# Patient Record
Sex: MALE | Race: WHITE | Employment: OTHER | URBAN - METROPOLITAN AREA
[De-identification: names, ages, dates, MRNs, and addresses within clinical notes are randomized per-mention and may not be internally consistent; named-entity substitution may affect disease eponyms.]

---

## 2017-03-09 DIAGNOSIS — I25.10 CORONARY ARTERY DISEASE INVOLVING NATIVE HEART, ANGINA PRESENCE UNSPECIFIED, UNSPECIFIED VESSEL OR LESION TYPE: ICD-10-CM

## 2017-03-09 RX ORDER — NITROGLYCERIN 0.4 MG/1
0.4 TABLET SUBLINGUAL EVERY 5 MIN PRN
Qty: 100 TABLET | Refills: 1 | Status: SHIPPED | OUTPATIENT
Start: 2017-03-09 | End: 2018-09-27

## 2017-05-17 DIAGNOSIS — I25.10 CORONARY ARTERY DISEASE INVOLVING NATIVE HEART, ANGINA PRESENCE UNSPECIFIED, UNSPECIFIED VESSEL OR LESION TYPE: ICD-10-CM

## 2017-05-17 DIAGNOSIS — I10 ESSENTIAL HYPERTENSION, BENIGN: ICD-10-CM

## 2017-05-17 RX ORDER — CARVEDILOL 25 MG/1
25 TABLET ORAL 2 TIMES DAILY WITH MEALS
Qty: 180 TABLET | Refills: 0 | Status: SHIPPED | OUTPATIENT
Start: 2017-05-17 | End: 2017-09-20

## 2017-05-17 RX ORDER — RAMIPRIL 10 MG/1
10 CAPSULE ORAL 2 TIMES DAILY
Qty: 180 CAPSULE | Refills: 0 | Status: SHIPPED | OUTPATIENT
Start: 2017-05-17 | End: 2017-09-20

## 2017-05-17 NOTE — TELEPHONE ENCOUNTER
LOV 4/21/16. Future OV 5/30/17. RN refilled rx for sufficient amt of medication unitl apt on 5/30/17 with Dr. Valdez for further refills.

## 2017-05-30 ENCOUNTER — OFFICE VISIT (OUTPATIENT)
Dept: CARDIOLOGY | Facility: CLINIC | Age: 72
End: 2017-05-30
Attending: INTERNAL MEDICINE
Payer: COMMERCIAL

## 2017-05-30 VITALS
HEIGHT: 74 IN | HEART RATE: 64 BPM | WEIGHT: 283 LBS | BODY MASS INDEX: 36.32 KG/M2 | SYSTOLIC BLOOD PRESSURE: 120 MMHG | DIASTOLIC BLOOD PRESSURE: 70 MMHG

## 2017-05-30 DIAGNOSIS — I25.10 CORONARY ARTERY DISEASE INVOLVING NATIVE HEART, ANGINA PRESENCE UNSPECIFIED, UNSPECIFIED VESSEL OR LESION TYPE: ICD-10-CM

## 2017-05-30 DIAGNOSIS — I10 ESSENTIAL HYPERTENSION, BENIGN: ICD-10-CM

## 2017-05-30 DIAGNOSIS — I35.0 NONRHEUMATIC AORTIC VALVE STENOSIS: Primary | ICD-10-CM

## 2017-05-30 PROCEDURE — 99214 OFFICE O/P EST MOD 30 MIN: CPT | Performed by: INTERNAL MEDICINE

## 2017-05-30 RX ORDER — TAMSULOSIN HYDROCHLORIDE 0.4 MG/1
0.4 CAPSULE ORAL DAILY
COMMUNITY
End: 2019-02-27

## 2017-05-30 NOTE — LETTER
5/30/2017    Nomi Gil MD  [facility]   [address]  [facility] [address]    RE: Travis Villa       Dear Colleague,    I had the pleasure of seeing Travis Villa in the [facility].    Travis Villa, a 71-year-old man with coronary artery disease, hypertension, type 2 diabetes mellitus, osteoarthritis, obesity and prostatic hypertrophy was seen today at your request for followup.      Mr. Villa underwent implantation of 2 sirolimus-eluting stents in the right coronary and 1 sirolimus-eluting stent in the circumflex in 2008 after a screening nuclear stress test showed inferolateral ischemia.  The left main and LAD had no significant narrowing.  He underwent a negative nuclear stress test in 2012 and in 2016.  He remains  free of angina since I last saw him. .     The patient has been tried on all different statin agents and has not tolerated them.      Mr. Villa has disabling osteoarthritis involving his hips.  He is contemplating surgery with Dr. Forbes at some point in the future.  The patient tells me he was diagnosed with low testosterone levels and has been on AndroGel supplements. The patient is no longer taking supplements and he plans to ask Dr. Gil if he can resume them.      The patient  has gained approximately 9 pounds since I saw him last year.  His activities are limited by disabling hip pain.      PAST MEDICAL HISTORY:   1.  Coronary artery disease:   a.  History of stent implantation in right coronary and circumflex in 2008.   b.  Negative nuclear stress tests in 2012 and 2016.     2.  Mild aortic stenosis.  Echocardiogram 2015 showing mean systolic gradient of 15, normal left ventricular function.   3.  Osteoarthritis awaiting possible hip replacement.   4.  Diabetes mellitus.   5.  Prostatic hypertrophy.   6.  Low testosterone levels.      PHYSICAL EXAMINATION:   GENERAL:  Exam today demonstrates a very pleasant and  cooperative 71-year-old man.   VITAL SIGNS:  His blood pressure was 120/70.  His heart rate is 64.  His height is 1.9 meters.  His weight is 128.4 kg for a BMI of 36.4.   LUNGS:  Clear to percussion and auscultation.   CARDIOVASCULAR:  Shows a normal S1 with a normal S2 with a grade 2/6 systolic ejection murmur.  His pulses are full and symmetrical with no pulsus parvus et tardus.     Outpatient Encounter Prescriptions as of 5/30/2017   Medication Sig Dispense Refill     tamsulosin (FLOMAX) 0.4 MG capsule Take 0.4 mg by mouth daily       ramipril (ALTACE) 10 MG capsule Take 1 capsule (10 mg) by mouth 2 times daily 180 capsule 0     carvedilol (COREG) 25 MG tablet Take 1 tablet (25 mg) by mouth 2 times daily (with meals) 180 tablet 0     nitroglycerin (NITROSTAT) 0.4 MG sublingual tablet Place 1 tablet (0.4 mg) under the tongue every 5 minutes as needed for chest pain 100 tablet 1     indapamide (LOZOL) 1.25 MG tablet Take 1 tablet (1.25 mg) by mouth every morning 90 tablet 3     [DISCONTINUED] amLODIPine (NORVASC) 5 MG tablet Take 1 tablet (5 mg) by mouth daily 90 tablet 3     Niacin, Antihyperlipidemic, (NIASPAN PO) Take 1,000 mg by mouth 2 times daily.       METFORMIN HCL PO Take 500 mg by mouth daily (with breakfast).       ASPIRIN PO Take 81 mg by mouth 2 times daily.       Ascorbic Acid (VITAMIN C PO) Take 1,000 mg by mouth 2 times daily.       multivitamin, therapeutic with minerals (MULTI-VITAMIN) TABS Take 1 tablet by mouth daily.       CYANOCOBALAMIN PO Take 1,000 mcg by mouth 2 times daily.       Flaxseed, Linseed, OIL Take 1,300 mg by mouth 2 times daily.       Omega-3 Fatty Acids (OMEGA-3 FISH OIL PO) Take 2,800 g by mouth 2 times daily (with meals).       GLUCOSAMINE SULFATE PO Take 1,500 mg by mouth 2 times daily.       calcium-magnesium (CALMAG) 500-250 MG TABS Take 1 tablet by mouth 2 times daily (with meals).       Saw Palmetto, Serenoa repens, (SAW PALMETTO CONCENTRATE OR) Take 500 mg by mouth 2  times daily.       FOLIC ACID PO Take 400 mcg by mouth 2 times daily        garlic 150 MG TABS Take 1,000 mg by mouth 2 times daily.       Resveratrol 100 MG CAPS Take 100 mg by mouth 2 times daily.       Cholecalciferol (VITAMIN D3 PO) Take 5,000 Units by mouth daily.       COENZYME Q-10 PO Take 200 mg by mouth daily.       No facility-administered encounter medications on file as of 5/30/2017.       ASSESSMENT:  Mr. Villa has no cardiovascular symptoms at this time.  He underwent a recent nuclear stress test that showed no ischemia and a well-preserved ejection fraction.  He has known mild aortic stenosis and a followup echo would be reasonable to make certain there has been no progression, especially if hip surgery is considered in the future.      There is conflicting data regarding the cardiovascular risk of testosterone supplementation.  If you feel the patient would  benefit from testosterone, there is no definite cardiovascular contraindication.  I am uncertain whether the patient's mildly elevated PSA level would disqualify him from testosterone, but I would discuss this issue with his urologist.      If you feel the patient would benefit from hip surgery, I see no cardiovascular contraindication and I suspect that unless his echo shows that there has been significant progression in his aortic stenosis, he can proceed to surgery without further cardiac testing.      I have reviewed the benefits of a Mediterranean style weight loss diet.  The patient may wish to consider water aerobics as a source of exercise to help maintain his weight.  I informed him that calorie restriction is the only way to lose weight.      The patient does not tolerated statin therapy in the past and presently, there is not clear evidence that PCSK9 inhibitors are helpful in this setting.      RECOMMENDATIONS:   1.  Echocardiogram to make certain that his aortic stenosis has not progressed.   2.  Barring any surprising findings on  the echo, I believe the patient could undergo hip surgery at low cardiovascular risk.   3.  The patient will benefit from weight loss and a regular exercise program.  I suggested water aerobics and a Mediterranean-style diet.   4.  Unless the urologist feels that the patient cannot safely take androgen supplement due to his elevated PSA level, I see clear cardiovascular contraindication to testosterone therapy if you feel the patient would benefit.   5.  Followup visit with me in 1 year.      We greatly appreciate the opportunity to help your patient, Mr. Travis Villa.     Sincerely,    Ambrocio Valdez MD     [facility]    cc:      Wes Forbes MD  [facility]

## 2017-05-30 NOTE — MR AVS SNAPSHOT
After Visit Summary   5/30/2017    Travis Villa    MRN: [id number]           Patient Information     Date Of Birth          1945        Visit Information        Provider Department      5/30/2017 9:15 AM Ambrocio Valdez MD [facility]        Today's Diagnoses     Nonrheumatic aortic valve stenosis    -  1    Coronary artery disease involving native heart, angina presence unspecified, unspecified vessel or lesion type        Essential hypertension, benign           Follow-ups after your visit        Additional Services     Follow-Up with Cardiologist                 Your next 10 appointments already scheduled     Jun 09, 2017  2:00 PM CDT   Ech Complete with [id number]   [facility] CV Echocardiography (Cardiovascular Imaging at [facility])    [address] [place] [id number] [phone]           1.  Please bring or wear a comfortable two-piece outfit. 2.  You may eat, drink and take your normal medicines. 3.  For any questions that cannot be answered, please contact the ordering physician              Who to contact     If you have questions or need follow up information about today's clinic visit or your schedule please contact [facility] [place] directly at [phone].  Normal or non-critical lab and imaging results will be communicated to you by MyChart, letter or phone within 4 business days after the clinic has received the results. If you do not hear from us within 7 days, please contact the clinic through MyChart or phone. If you have a critical or abnormal lab result, we will notify you by phone as soon as possible.  Submit refill requests through [employer] or call your pharmacy and they will forward the refill request to us. Please allow 3 business days for your refill to be completed.          Additional Information About Your Visit        MyChart  "Information     [employer] lets you send messages to your doctor, view your test results, renew your prescriptions, schedule appointments and more. To sign up, go to [email or web address] . Click on \"Log in\" on the left side of the screen, which will take you to the Welcome page. Then click on \"Sign up Now\" on the right side of the page.     You will be asked to enter the access code listed below, as well as some personal information. Please follow the directions to create your username and password.     Your access code is: [email or web address]  Expires: 2017  8:20 AM     Your access code will  in 90 days. If you need help or a new code, please call your [facility] clinic or [phone].        Care EveryWhere ID     This is your Care EveryWhere ID. This could be used by other organizations to access your [facility] medical records  [id number]        Your Vitals Were     Pulse Height BMI (Body Mass Index)             64 1.88 m (6' 2\") 36.34 kg/m2          Blood Pressure from Last 3 Encounters:   17 120/70   16 142/78   16 142/72    Weight from Last 3 Encounters:   17 128.4 kg (283 lb)   16 123.4 kg (272 lb)   03/11/15 128 kg (282 lb 3 oz)              We Performed the Following     Follow-Up with Cardiologist        Primary Care Provider Office Phone # Fax #    Nomi Gil [phone]       [facility] [address] [address]        Thank you!     Thank you for choosing [facility] [place]  for your care. Our goal is always to provide you with excellent care. Hearing back from our patients is one way we can continue to improve our services. Please take a few minutes to complete the written survey that you may receive in the mail after your visit with us. Thank you!             Your Updated Medication List - Protect others around you: Learn how to safely use, store and throw away your medicines at " www.disposemymeds.org.          This list is accurate as of: 5/30/17 11:59 PM.  Always use your most recent med list.                   Brand Name Dispense Instructions for use    amLODIPine 5 MG tablet    NORVASC    90 tablet    Take 1 tablet (5 mg) by mouth daily       ASPIRIN PO      Take 81 mg by mouth 2 times daily.       calcium-magnesium 500-250 MG Tabs per tablet    CALMAG     Take 1 tablet by mouth 2 times daily (with meals).       carvedilol 25 MG tablet    COREG    180 tablet    Take 1 tablet (25 mg) by mouth 2 times daily (with meals)       COENZYME Q-10 PO      Take 200 mg by mouth daily.       CYANOCOBALAMIN PO      Take 1,000 mcg by mouth 2 times daily.       Flaxseed (Linseed) Oil      Take 1,300 mg by mouth 2 times daily.       FLOMAX 0.4 MG capsule   Generic drug:  tamsulosin      Take 0.4 mg by mouth daily       FOLIC ACID PO      Take 400 mcg by mouth 2 times daily       garlic 150 MG Tabs tablet      Take 1,000 mg by mouth 2 times daily.       GLUCOSAMINE SULFATE PO      Take 1,500 mg by mouth 2 times daily.       indapamide 1.25 MG tablet    LOZOL    90 tablet    Take 1 tablet (1.25 mg) by mouth every morning       METFORMIN HCL PO      Take 500 mg by mouth daily (with breakfast).       Multi-vitamin Tabs tablet      Take 1 tablet by mouth daily.       NIASPAN PO      Take 1,000 mg by mouth 2 times daily.       nitroglycerin 0.4 MG sublingual tablet    NITROSTAT    100 tablet    Place 1 tablet (0.4 mg) under the tongue every 5 minutes as needed for chest pain       OMEGA-3 FISH OIL PO      Take 2,800 g by mouth 2 times daily (with meals).       ramipril 10 MG capsule    ALTACE    180 capsule    Take 1 capsule (10 mg) by mouth 2 times daily       Resveratrol 100 MG Caps      Take 100 mg by mouth 2 times daily.       SAW PALMETTO CONCENTRATE OR      Take 500 mg by mouth 2 times daily.       VITAMIN C PO      Take 1,000 mg by mouth 2 times daily.       VITAMIN D3 PO      Take 5,000 Units by mouth  daily.

## 2017-05-30 NOTE — PROGRESS NOTES
HPI and Plan:   See dictation    Orders Placed This Encounter   Procedures     Follow-Up with Cardiologist       Orders Placed This Encounter   Medications     tamsulosin (FLOMAX) 0.4 MG capsule     Sig: Take 0.4 mg by mouth daily       There are no discontinued medications.      Encounter Diagnoses   Name Primary?     Coronary artery disease involving native heart, angina presence unspecified, unspecified vessel or lesion type      Essential hypertension, benign        CURRENT MEDICATIONS:  Current Outpatient Prescriptions   Medication Sig Dispense Refill     tamsulosin (FLOMAX) 0.4 MG capsule Take 0.4 mg by mouth daily       ramipril (ALTACE) 10 MG capsule Take 1 capsule (10 mg) by mouth 2 times daily 180 capsule 0     carvedilol (COREG) 25 MG tablet Take 1 tablet (25 mg) by mouth 2 times daily (with meals) 180 tablet 0     nitroglycerin (NITROSTAT) 0.4 MG sublingual tablet Place 1 tablet (0.4 mg) under the tongue every 5 minutes as needed for chest pain 100 tablet 1     amLODIPine (NORVASC) 5 MG tablet Take 1 tablet (5 mg) by mouth daily 90 tablet 3     indapamide (LOZOL) 1.25 MG tablet Take 1 tablet (1.25 mg) by mouth every morning 90 tablet 3     Niacin, Antihyperlipidemic, (NIASPAN PO) Take 1,000 mg by mouth 2 times daily.       METFORMIN HCL PO Take 500 mg by mouth daily (with breakfast).       ASPIRIN PO Take 81 mg by mouth 2 times daily.       Ascorbic Acid (VITAMIN C PO) Take 1,000 mg by mouth 2 times daily.       multivitamin, therapeutic with minerals (MULTI-VITAMIN) TABS Take 1 tablet by mouth daily.       CYANOCOBALAMIN PO Take 1,000 mcg by mouth 2 times daily.       Flaxseed, Linseed, OIL Take 1,300 mg by mouth 2 times daily.       Omega-3 Fatty Acids (OMEGA-3 FISH OIL PO) Take 2,800 g by mouth 2 times daily (with meals).       GLUCOSAMINE SULFATE PO Take 1,500 mg by mouth 2 times daily.       calcium-magnesium (CALMAG) 500-250 MG TABS Take 1 tablet by mouth 2 times daily (with meals).       Saw  Palmetto, Serenoa repens, (SAW PALMETTO CONCENTRATE OR) Take 500 mg by mouth 2 times daily.       FOLIC ACID PO Take 400 mcg by mouth 2 times daily        garlic 150 MG TABS Take 1,000 mg by mouth 2 times daily.       Resveratrol 100 MG CAPS Take 100 mg by mouth 2 times daily.       Cholecalciferol (VITAMIN D3 PO) Take 5,000 Units by mouth daily.       COENZYME Q-10 PO Take 200 mg by mouth daily.         ALLERGIES     Allergies   Allergen Reactions     Atorvastatin      Muscle aches       PAST MEDICAL HISTORY:  Past Medical History:   Diagnosis Date     Benign essential HTN      Benign prostatic hyperplasia      Coronary artery disease involving native coronary artery of native heart      Diabetes mellitus (H)      GERD (gastroesophageal reflux disease)      Mixed hyperlipidemia      Myocardial infarction (H)      Obesity      Osteoarthritis      Unspecified disorder of metabolism        PAST SURGICAL HISTORY:  Past Surgical History:   Procedure Laterality Date     HEART CATH, ANGIOPLASTY  5/2/2008     PCI RCA,Circumflex rotational atherectomy, and ARETHA placement       FAMILY HISTORY:  History reviewed. No pertinent family history.    SOCIAL HISTORY:  Social History     Social History     Marital status:      Spouse name: N/A     Number of children: N/A     Years of education: N/A     Social History Main Topics     Smoking status: Never Smoker     Smokeless tobacco: None     Alcohol use Yes     Drug use: No     Sexual activity: Not Asked     Other Topics Concern     Caffeine Concern No     Special Diet Not Asked     [facility]     Exercise No     Seat Belt Yes     Social History Narrative       Review of Systems:  Skin:  Negative       Eyes:  Negative      ENT:  Negative      Respiratory:  Negative       Cardiovascular:    Positive for;fatigue;edema    Gastroenterology: Negative      Genitourinary:  not assessed      Musculoskeletal:  Positive for arthritis;joint pain;joint stiffness    Neurologic:   "Negative      Psychiatric:  Negative      Heme/Lymph/Imm:  Negative      Endocrine:  Negative        Physical Exam:  Vitals: /70  Pulse 64  Ht 1.88 m (6' 2\")  Wt 128.4 kg (283 lb)  BMI 36.34 kg/m2    Constitutional:  cooperative, alert and oriented, well developed, well nourished, in no acute distress obese      Skin:  warm and dry to the touch, no apparent skin lesions or masses noted        Head:  normocephalic, no masses or lesions        Eyes:  pupils equal and round, conjunctivae and lids unremarkable, sclera white, no xanthalasma, EOMS intact, no nystagmus        ENT:  no pallor or cyanosis, dentition good        Neck:  carotid pulses are full and equal bilaterally, JVP normal, no carotid bruit, no thyromegaly        Chest:  normal breath sounds, clear to auscultation, normal A-P diameter, normal symmetry, normal respiratory excursion, no use of accessory muscles          Cardiac: regular rhythm, normal S1/S2, no S3 or S4, apical impulse not displaced, no murmurs, gallops or rubs                  Abdomen:  abdomen soft, non-tender, BS normoactive, no mass, no HSM, no bruits obese      Vascular: pulses full and equal, no bruits auscultated                                        Extremities and Back:  no deformities, clubbing, cyanosis, erythema observed              Neurological:  affect appropriate, oriented to time, person and place              CC  Ambrocio Valdez MD   [facility]  [address]  [place] [id number]              "

## 2017-05-30 NOTE — PROGRESS NOTES
HISTORY OF PRESENT ILLNESS:  Travis Villa, a 71-year-old man with coronary artery disease, hypertension, type 2 diabetes mellitus, osteoarthritis, obesity and prostatic hypertrophy was seen today at your request for followup.      Mr. Villa underwent implantation of 2 sirolimus-eluting stents in the right coronary and 1 sirolimus-eluting stent in the circumflex in 2008 after a screening nuclear stress test showed inferolateral ischemia.  The left main and LAD had no significant narrowing.  He underwent a negative nuclear stress test in 2012 and in 2016.  He remains  free of angina since I last saw him. .     The patient has been tried on all different statin agents and has not tolerated them.      Mr. Villa has disabling osteoarthritis involving his hips.  He is contemplating surgery with Dr. Forbes at some point in the future.  The patient tells me he was diagnosed with low testosterone levels and has been on AndroGel supplements. The patient is no longer taking supplements and he plans to ask Dr. Gil if he can resume them.      The patient  has gained approximately 9 pounds since I saw him last year.  His activities are limited by disabling hip pain.      PAST MEDICAL HISTORY:   1.  Coronary artery disease:   a.  History of stent implantation in right coronary and circumflex in 2008.   b.  Negative nuclear stress tests in 2012 and 2016.     2.  Mild aortic stenosis.  Echocardiogram 2015 showing mean systolic gradient of 15, normal left ventricular function.   3.  Osteoarthritis awaiting possible hip replacement.   4.  Diabetes mellitus.   5.  Prostatic hypertrophy.   6.  Low testosterone levels.      PHYSICAL EXAMINATION:   GENERAL:  Exam today demonstrates a very pleasant and cooperative 71-year-old man.   VITAL SIGNS:  His blood pressure was 120/70.  His heart rate is 64.  His height is 1.9 meters.  His weight is 128.4 kg for a BMI of 36.4.   LUNGS:  Clear to percussion and auscultation.   CARDIOVASCULAR:   Shows a normal S1 with a normal S2 with a grade 2/6 systolic ejection murmur.  His pulses are full and symmetrical with no pulsus parvus et tardus.      ASSESSMENT:  Mr. Villa has no cardiovascular symptoms at this time.  He underwent a recent nuclear stress test that showed no ischemia and a well-preserved ejection fraction.  He has known mild aortic stenosis and a followup echo would be reasonable to make certain there has been no progression, especially if hip surgery is considered in the future.      There is conflicting data regarding the cardiovascular risk of testosterone supplementation.  If you feel the patient would  benefit from testosterone, there is no definite cardiovascular contraindication.  I am uncertain whether the patient's mildly elevated PSA level would disqualify him from testosterone, but I would discuss this issue with his urologist.      If you feel the patient would benefit from hip surgery, I see no cardiovascular contraindication and I suspect that unless his echo shows that there has been significant progression in his aortic stenosis, he can proceed to surgery without further cardiac testing.      I have reviewed the benefits of a Mediterranean style weight loss diet.  The patient may wish to consider water aerobics as a source of exercise to help maintain his weight.  I informed him that calorie restriction is the only way to lose weight.      The patient does not tolerated statin therapy in the past and presently, there is not clear evidence that PCSK9 inhibitors are helpful in this setting.      RECOMMENDATIONS:   1.  Echocardiogram to make certain that his aortic stenosis has not progressed.   2.  Barring any surprising findings on the echo, I believe the patient could undergo hip surgery at low cardiovascular risk.   3.  The patient will benefit from weight loss and a regular exercise program.  I suggested water aerobics and a Mediterranean-style diet.   4.  Unless the urologist  feels that the patient cannot safely take androgen supplement due to his elevated PSA level, I see clear cardiovascular contraindication to testosterone therapy if you feel the patient would benefit.   5.  Followup visit with me in 1 year.      We greatly appreciate the opportunity to help your patient, Mr. Catracho Mike.      cc:      [place] [address]      Wes Forbes MD  [facility]         JAYDEN ALSTON MD             D: 2017 09:48   T: 2017 10:26   MT: DOMINGO      Name:     CATRACHO MIKE   MRN:      [id number]        Account:      [id number]   :      1945           Service Date: 2017      Document: [id number]

## 2017-06-02 DIAGNOSIS — I10 ESSENTIAL HYPERTENSION, BENIGN: ICD-10-CM

## 2017-06-02 RX ORDER — AMLODIPINE BESYLATE 5 MG/1
5 TABLET ORAL DAILY
Qty: 90 TABLET | Refills: 3 | Status: SHIPPED | OUTPATIENT
Start: 2017-06-02 | End: 2018-06-05

## 2017-06-09 ENCOUNTER — HOSPITAL ENCOUNTER (OUTPATIENT)
Dept: CARDIOLOGY | Facility: CLINIC | Age: 72
Discharge: HOME | End: 2017-06-09
Attending: INTERNAL MEDICINE | Admitting: INTERNAL MEDICINE
Payer: COMMERCIAL

## 2017-06-09 DIAGNOSIS — I35.0 NONRHEUMATIC AORTIC VALVE STENOSIS: ICD-10-CM

## 2017-06-09 PROCEDURE — 40000264 ECHO COMPLETE WITH LUMASON

## 2017-06-09 PROCEDURE — 25500064 ZZH RX 255 OP 636: Performed by: INTERNAL MEDICINE

## 2017-06-09 PROCEDURE — 93306 TTE W/DOPPLER COMPLETE: CPT | Mod: 26 | Performed by: INTERNAL MEDICINE

## 2017-06-09 RX ADMIN — SULFUR HEXAFLUORIDE 5 ML: KIT at 15:00

## 2017-06-13 ENCOUNTER — TELEPHONE (OUTPATIENT)
Dept: CARDIOLOGY | Facility: CLINIC | Age: 72
End: 2017-06-13

## 2017-06-13 NOTE — TELEPHONE ENCOUNTER
I called pt and reviewed echo results and 's recommendations with him. Pt said he has not scheduled his hip replacement surgery yet, but will let us know when he does so records can be sent to the ortho clinic and PMD for when he needs clearance. Heike FELICIANO

## 2017-06-13 NOTE — TELEPHONE ENCOUNTER
----- Message from Ambrocio Valdez MD sent at 6/9/2017  4:05 PM CDT -----  Regarding: TTE   Please see my note. The TTE shows mild AS and no further preop cardiac testing will be needed prior to his hip surgery. We will see him in follow up as clinic note indicates. Thanks

## 2017-07-10 DIAGNOSIS — I25.10 CORONARY ARTERY DISEASE INVOLVING NATIVE HEART, ANGINA PRESENCE UNSPECIFIED, UNSPECIFIED VESSEL OR LESION TYPE: ICD-10-CM

## 2017-07-10 RX ORDER — INDAPAMIDE 1.25 MG/1
1.25 TABLET ORAL EVERY MORNING
Qty: 90 TABLET | Refills: 3 | Status: SHIPPED | OUTPATIENT
Start: 2017-07-10 | End: 2018-06-29

## 2017-09-20 DIAGNOSIS — I25.10 CORONARY ARTERY DISEASE INVOLVING NATIVE HEART, ANGINA PRESENCE UNSPECIFIED, UNSPECIFIED VESSEL OR LESION TYPE: ICD-10-CM

## 2017-09-20 DIAGNOSIS — I10 ESSENTIAL HYPERTENSION, BENIGN: ICD-10-CM

## 2017-09-20 RX ORDER — CARVEDILOL 25 MG/1
25 TABLET ORAL 2 TIMES DAILY WITH MEALS
Qty: 180 TABLET | Refills: 2 | Status: SHIPPED | OUTPATIENT
Start: 2017-09-20 | End: 2018-06-25

## 2017-09-20 RX ORDER — RAMIPRIL 10 MG/1
10 CAPSULE ORAL 2 TIMES DAILY
Qty: 180 CAPSULE | Refills: 2 | Status: SHIPPED | OUTPATIENT
Start: 2017-09-20 | End: 2018-06-25

## 2017-12-06 ENCOUNTER — TRANSFERRED RECORDS (OUTPATIENT)
Dept: HEALTH INFORMATION MANAGEMENT | Facility: CLINIC | Age: 72
End: 2017-12-06

## 2017-12-06 LAB
ANION GAP SERPL CALCULATED.3IONS-SCNC: 11.9 MMOL/L
BUN SERPL-MCNC: 12 MG/DL
CALCIUM SERPL-MCNC: 8.8 MG/DL
CHLORIDE SERPLBLD-SCNC: 98 MMOL/L
CHOLEST SERPL-MCNC: 190 MG/DL
CO2 SERPL-SCNC: 31 MMOL/L
CREAT SERPL-MCNC: 0.75 MG/DL
GFR SERPL CREATININE-BSD FRML MDRD: ABNORMAL ML/MIN/1.73M2
GLUCOSE SERPL-MCNC: 139 MG/DL (ref 70–99)
HDLC SERPL-MCNC: 32 MG/DL
LDLC SERPL CALC-MCNC: 112 MG/DL
POTASSIUM SERPL-SCNC: 3.9 MMOL/L
SODIUM SERPL-SCNC: 137 MMOL/L
TRIGL SERPL-MCNC: 229 MG/DL
TSH SERPL-ACNC: 3.09 MCU/ML

## 2018-06-05 DIAGNOSIS — I10 ESSENTIAL HYPERTENSION, BENIGN: ICD-10-CM

## 2018-06-05 RX ORDER — AMLODIPINE BESYLATE 5 MG/1
5 TABLET ORAL DAILY
Qty: 90 TABLET | Refills: 0 | Status: SHIPPED | OUTPATIENT
Start: 2018-06-05 | End: 2018-09-04

## 2018-06-25 DIAGNOSIS — I10 ESSENTIAL HYPERTENSION, BENIGN: ICD-10-CM

## 2018-06-25 DIAGNOSIS — I25.10 CORONARY ARTERY DISEASE INVOLVING NATIVE HEART, ANGINA PRESENCE UNSPECIFIED, UNSPECIFIED VESSEL OR LESION TYPE: ICD-10-CM

## 2018-06-25 RX ORDER — RAMIPRIL 10 MG/1
10 CAPSULE ORAL 2 TIMES DAILY
Qty: 180 CAPSULE | Refills: 0 | Status: SHIPPED | OUTPATIENT
Start: 2018-06-25 | End: 2018-09-26

## 2018-06-25 RX ORDER — CARVEDILOL 25 MG/1
25 TABLET ORAL 2 TIMES DAILY WITH MEALS
Qty: 180 TABLET | Refills: 0 | Status: SHIPPED | OUTPATIENT
Start: 2018-06-25 | End: 2018-09-26

## 2018-06-29 DIAGNOSIS — I25.10 CORONARY ARTERY DISEASE INVOLVING NATIVE HEART, ANGINA PRESENCE UNSPECIFIED, UNSPECIFIED VESSEL OR LESION TYPE: ICD-10-CM

## 2018-06-29 RX ORDER — INDAPAMIDE 1.25 MG/1
1.25 TABLET ORAL EVERY MORNING
Qty: 90 TABLET | Refills: 0 | Status: SHIPPED | OUTPATIENT
Start: 2018-06-29 | End: 2018-09-26

## 2018-07-02 ENCOUNTER — TELEPHONE (OUTPATIENT)
Dept: CARDIOLOGY | Facility: CLINIC | Age: 73
End: 2018-07-02

## 2018-07-02 NOTE — TELEPHONE ENCOUNTER
Pt LVM stating he wanted refills to last until he sees Dr. Valdez in December, requested call back. Called back and spoke to pt. Advised pt he was due to see Dr. Valdez on 5/30/18. Also refills were sent to [facility] in [place] last week for his meds prescribed by Dr. Valdez. Pt stated he will call the pharmacy to see if they have his refills. Advised pt will send message to scheduling to make sure pt is on wait list to see Dr. Valdez and they will contact pt to schedule.

## 2018-09-04 DIAGNOSIS — I10 ESSENTIAL HYPERTENSION, BENIGN: ICD-10-CM

## 2018-09-04 RX ORDER — AMLODIPINE BESYLATE 5 MG/1
5 TABLET ORAL DAILY
Qty: 30 TABLET | Refills: 0 | Status: SHIPPED | OUTPATIENT
Start: 2018-09-04 | End: 2018-09-27

## 2018-09-26 DIAGNOSIS — I25.10 CORONARY ARTERY DISEASE INVOLVING NATIVE HEART, ANGINA PRESENCE UNSPECIFIED, UNSPECIFIED VESSEL OR LESION TYPE: ICD-10-CM

## 2018-09-26 DIAGNOSIS — I10 ESSENTIAL HYPERTENSION, BENIGN: ICD-10-CM

## 2018-09-26 RX ORDER — CARVEDILOL 25 MG/1
25 TABLET ORAL 2 TIMES DAILY WITH MEALS
Qty: 180 TABLET | Refills: 0 | Status: SHIPPED | OUTPATIENT
Start: 2018-09-26 | End: 2018-09-27

## 2018-09-26 RX ORDER — RAMIPRIL 10 MG/1
10 CAPSULE ORAL 2 TIMES DAILY
Qty: 180 CAPSULE | Refills: 0 | Status: SHIPPED | OUTPATIENT
Start: 2018-09-26 | End: 2018-09-27

## 2018-09-26 RX ORDER — INDAPAMIDE 1.25 MG/1
1.25 TABLET ORAL EVERY MORNING
Qty: 90 TABLET | Refills: 0 | Status: SHIPPED | OUTPATIENT
Start: 2018-09-26 | End: 2018-09-27

## 2018-09-27 ENCOUNTER — OFFICE VISIT (OUTPATIENT)
Dept: CARDIOLOGY | Facility: CLINIC | Age: 73
End: 2018-09-27
Payer: COMMERCIAL

## 2018-09-27 VITALS
BODY MASS INDEX: 36.34 KG/M2 | DIASTOLIC BLOOD PRESSURE: 62 MMHG | SYSTOLIC BLOOD PRESSURE: 128 MMHG | HEIGHT: 74 IN | HEART RATE: 62 BPM

## 2018-09-27 DIAGNOSIS — I25.10 CORONARY ARTERY DISEASE INVOLVING NATIVE CORONARY ARTERY OF NATIVE HEART WITHOUT ANGINA PECTORIS: Primary | ICD-10-CM

## 2018-09-27 DIAGNOSIS — I10 ESSENTIAL HYPERTENSION, BENIGN: ICD-10-CM

## 2018-09-27 DIAGNOSIS — I25.10 CORONARY ARTERY DISEASE INVOLVING NATIVE HEART, ANGINA PRESENCE UNSPECIFIED, UNSPECIFIED VESSEL OR LESION TYPE: ICD-10-CM

## 2018-09-27 DIAGNOSIS — I10 BENIGN ESSENTIAL HYPERTENSION: ICD-10-CM

## 2018-09-27 PROCEDURE — 99214 OFFICE O/P EST MOD 30 MIN: CPT | Performed by: INTERNAL MEDICINE

## 2018-09-27 RX ORDER — INDAPAMIDE 1.25 MG/1
1.25 TABLET ORAL EVERY MORNING
Qty: 90 TABLET | Refills: 0 | Status: SHIPPED | OUTPATIENT
Start: 2018-09-27 | End: 2019-04-08

## 2018-09-27 RX ORDER — CARVEDILOL 25 MG/1
25 TABLET ORAL 2 TIMES DAILY WITH MEALS
Qty: 180 TABLET | Refills: 0 | Status: SHIPPED | OUTPATIENT
Start: 2018-09-27 | End: 2019-04-11

## 2018-09-27 RX ORDER — NITROGLYCERIN 0.4 MG/1
0.4 TABLET SUBLINGUAL EVERY 5 MIN PRN
Qty: 100 TABLET | Refills: 1 | Status: SHIPPED | OUTPATIENT
Start: 2018-09-27 | End: 2019-03-26

## 2018-09-27 RX ORDER — RAMIPRIL 10 MG/1
10 CAPSULE ORAL 2 TIMES DAILY
Qty: 180 CAPSULE | Refills: 0 | Status: SHIPPED | OUTPATIENT
Start: 2018-09-27 | End: 2019-03-25

## 2018-09-27 NOTE — PROGRESS NOTES
Service Date: 09/27/2018      HISTORY OF PRESENT ILLNESS:  Travis Vlila, a 73-year-old man with coronary artery disease, hypertension, type 2 diabetes mellitus, osteoarthritis, obesity, prostatic hypertrophy and mild aortic stenosis, was seen today at your request for followup.      Since I last saw the patient, he has lost about 20 pounds.  There has been marked improvement in his hip pain and he has thus far avoided surgery.  The patient's blood pressure has been much better controlled and he would like to discontinue amlodipine.      PAST MEDICAL HISTORY:   1.  Dyslipidemia, intolerant of all statin drugs after multiple trials.   2.  Coronary artery disease:   a. History of stent implantation, right coronary and circumflex, 2008.   b.  Negative nuclear stress test in 2012 and 2016.   3.  Mild aortic stenosis, echo in 2015 showing a mean systolic gradient of 15.  Normal ejection fraction.  No symptoms of aortic stenosis at that time.   4.  Osteoarthritis, managed medically with injections.  Markedly improved since weight loss.   5.  Diabetes mellitus.   6.  Prostate hypertrophy.      PHYSICAL EXAMINATION:   GENERAL:  Today, demonstrates a very pleasant and cooperative 73-year-old man.   VITAL SIGNS:  His blood pressure is 128/62, his heart rate is 62.  His height is 6 feet 2.  His weight is at 266 pounds, down about 20 pounds by his report.   LUNGS:  Clear to percussion and auscultation.   CARDIOVASCULAR:  Shows a normal S1 with a grade 2/6 ejection murmur.  S2 is normal.      ASSESSMENT:  Mr. Villa is asymptomatic, on excellent medical therapy.  Current ACC guidelines advise targeting a systolic pressure of less than 130mmHg.  I believe it would be reasonable to give a trial of stopping amlodipine to see if his blood pressure is controlled on carvedilol and ramipril alone.      I have offered that we could  a PCSK9 inhibitor for treatment of dyslipidemia and documented vascular disease in view of his statin  intolerance.  He wants to think this issue over and have a lipid profile done in your office in the near future.      RECOMMENDATIONS:   1.  Continue Mediterranean-style weight loss diet, regular exercise program.   2.  We will hold the patient's amlodipine at this time and recheck blood pressure with primary care doctor.  I am optimistic his blood pressure control will remain satisfactory on carvedilol, ramipril and  Indapamide with a target of 130 mmHg.    3.  The patient will get a lipid level drawn in your office.  If he wishes to consider a PCSK9 inhibitor, we will be happy to assist him with this process.  Otherwise, plan a followup visit in about 1 year.      We have appreciated the opportunity to see your patient, Catracho Mike.       Ambrocio Valdez MD       cc:   Nomi Gil MD    [address], [address] [place] [address]         AMBROCIO VALDEZ MD             D: 2018   T: 2018   MT: DW      Name:     CATRACHO MIKE   MRN:      [id number]        Account:      [id number]   :      1945           Service Date: 2018      Document: [id number]

## 2018-09-27 NOTE — PROGRESS NOTES
HISTORY OF PRESENT ILLNESS:  Has lost about 20 lbs. Hip better. bp better wants to get off amlodipine.    Orders this Visit:  No orders of the defined types were placed in this encounter.    No orders of the defined types were placed in this encounter.    There are no discontinued medications.    No diagnosis found.    CURRENT MEDICATIONS:  Current Outpatient Prescriptions   Medication Sig Dispense Refill     amLODIPine (NORVASC) 5 MG tablet Take 1 tablet (5 mg) by mouth daily 30 tablet 0     Ascorbic Acid (VITAMIN C PO) Take 1,000 mg by mouth 2 times daily.       ASPIRIN PO Take 81 mg by mouth 2 times daily.       calcium-magnesium (CALMAG) 500-250 MG TABS Take 1 tablet by mouth 2 times daily (with meals).       carvedilol (COREG) 25 MG tablet Take 1 tablet (25 mg) by mouth 2 times daily (with meals) 180 tablet 0     Cholecalciferol (VITAMIN D3 PO) Take 5,000 Units by mouth daily.       COENZYME Q-10 PO Take 400 mg by mouth daily        Flaxseed, Linseed, OIL Take 1,300 mg by mouth 2 times daily.       FOLIC ACID PO Take 400 mcg by mouth 2 times daily        garlic 150 MG TABS Take 1,000 mg by mouth 2 times daily.       GLUCOSAMINE SULFATE PO Take 1,500 mg by mouth 2 times daily.       indapamide (LOZOL) 1.25 MG tablet Take 1 tablet (1.25 mg) by mouth every morning 90 tablet 0     METFORMIN HCL PO Take 500 mg by mouth 2 times daily (with meals)        multivitamin, therapeutic with minerals (MULTI-VITAMIN) TABS Take 1 tablet by mouth daily.       Niacin, Antihyperlipidemic, (NIASPAN PO) Take 1,000 mg by mouth 2 times daily.       nitroglycerin (NITROSTAT) 0.4 MG sublingual tablet Place 1 tablet (0.4 mg) under the tongue every 5 minutes as needed for chest pain 100 tablet 1     Omega-3 Fatty Acids (OMEGA-3 FISH OIL PO) Take 2,800 g by mouth 2 times daily (with meals).       ramipril (ALTACE) 10 MG capsule Take 1 capsule (10 mg) by mouth 2 times daily 180 capsule 0     Resveratrol 100 MG CAPS Take 100 mg by mouth 2  "times daily.       Saw Palmetto, Serenoa repens, (SAW PALMETTO CONCENTRATE OR) Take 500 mg by mouth 2 times daily.       CYANOCOBALAMIN PO Take 1,000 mcg by mouth 2 times daily.       tamsulosin (FLOMAX) 0.4 MG capsule Take 0.4 mg by mouth daily         ALLERGIES     Allergies   Allergen Reactions     Atorvastatin      Muscle aches       PAST MEDICAL, SURGICAL, FAMILY, SOCIAL HISTORY:  History was reviewed and updated as needed, see medical record.    Review of Systems:  A 12-point review of systems was completed, see medical record for detailed review of systems information.    Physical Exam:  Vitals: /62 (BP Location: Right arm, Patient Position: Chair, Cuff Size: Adult Large)  Pulse 62  Ht 1.88 m (6' 2\")  BMI 36.34 kg/m2    Constitutional:  cooperative, alert and oriented, well developed, well nourished, in no acute distress obese      Skin:  warm and dry to the touch, no apparent skin lesions or masses noted        Head:  normocephalic, no masses or lesions        Eyes:  pupils equal and round, conjunctivae and lids unremarkable, sclera white, no xanthalasma, EOMS intact, no nystagmus        ENT:  no pallor or cyanosis, dentition good        Neck:  carotid pulses are full and equal bilaterally, JVP normal, no carotid bruit        Chest:  normal breath sounds, clear to auscultation, normal A-P diameter, normal symmetry, normal respiratory excursion, no use of accessory muscles        Cardiac: normal S1 and S2       grade 2;systolic ejection murmur          Abdomen:  abdomen soft, non-tender, BS normoactive, no mass, no HSM, no bruits        Vascular:                                        Extremities and Back:  no deformities, clubbing, cyanosis, erythema observed        Neurological:  no gross motor deficits        ASSESSMENT: continue weight loss and exercise  Agree with trial of stopping amlodipine as long as systolic bp 130 or less       RECOMMENDATIONS: trial of stopping amlodipine, check bp on " visit with pmd coming up in 1 to 2 weeks.      Recent Lab Results:  LIPID RESULTS:  Lab Results   Component Value Date    CHOL 190 12/06/2017    HDL 32 12/06/2017     12/06/2017    TRIG 229 12/06/2017    CHOLHDLRATIO 3.9 03/13/2014       LIVER ENZYME RESULTS:  Lab Results   Component Value Date    AST 33 12/29/2012    ALT 6 03/13/2014       CBC RESULTS:  Lab Results   Component Value Date    WBC 5.4 12/29/2012    RBC 5.06 12/29/2012    HGB 14.8 12/29/2012    HCT 44.4 12/29/2012    MCV 88 12/29/2012    MCH 29.2 12/29/2012    MCHC 33.3 12/29/2012    RDW 14.5 12/29/2012     12/29/2012       BMP RESULTS:  Lab Results   Component Value Date     12/06/2017    POTASSIUM 3.9 12/06/2017    CHLORIDE 98 12/06/2017    CO2 31 12/06/2017    ANIONGAP 11.9 12/06/2017     (A) 12/06/2017    BUN 12 12/06/2017    CR 0.75 12/06/2017    GFRESTIMATED >90 12/29/2012    GFRESTBLACK >90 12/29/2012    LEIGH ANN 8.8 12/06/2017        A1C RESULTS:  No results found for: A1C    INR RESULTS:  Lab Results   Component Value Date    INR 1.03 05/02/2008    INR 1.04 03/13/2008       We greatly appreciate the opportunity to be involved in the care of your patient, Travis Villa.    Sincerely,  Ambrocio Valdez MD      CC  No referring provider defined for this encounter.

## 2018-09-27 NOTE — LETTER
9/27/2018    Nomi Gil MD  [facility] [address]  [facility] [address]    RE: Travis Villa       Dear Colleague,    I had the pleasure of seeing Travis Jann Villa in the [facility].    HISTORY OF PRESENT ILLNESS:  Has lost about 20 lbs. Hip better. bp better wants to get off amlodipine.    Orders this Visit:  No orders of the defined types were placed in this encounter.    No orders of the defined types were placed in this encounter.    There are no discontinued medications.    No diagnosis found.    CURRENT MEDICATIONS:  Current Outpatient Prescriptions   Medication Sig Dispense Refill     amLODIPine (NORVASC) 5 MG tablet Take 1 tablet (5 mg) by mouth daily 30 tablet 0     Ascorbic Acid (VITAMIN C PO) Take 1,000 mg by mouth 2 times daily.       ASPIRIN PO Take 81 mg by mouth 2 times daily.       calcium-magnesium (CALMAG) 500-250 MG TABS Take 1 tablet by mouth 2 times daily (with meals).       carvedilol (COREG) 25 MG tablet Take 1 tablet (25 mg) by mouth 2 times daily (with meals) 180 tablet 0     Cholecalciferol (VITAMIN D3 PO) Take 5,000 Units by mouth daily.       COENZYME Q-10 PO Take 400 mg by mouth daily        Flaxseed, Linseed, OIL Take 1,300 mg by mouth 2 times daily.       FOLIC ACID PO Take 400 mcg by mouth 2 times daily        garlic 150 MG TABS Take 1,000 mg by mouth 2 times daily.       GLUCOSAMINE SULFATE PO Take 1,500 mg by mouth 2 times daily.       indapamide (LOZOL) 1.25 MG tablet Take 1 tablet (1.25 mg) by mouth every morning 90 tablet 0     METFORMIN HCL PO Take 500 mg by mouth 2 times daily (with meals)        multivitamin, therapeutic with minerals (MULTI-VITAMIN) TABS Take 1 tablet by mouth daily.       Niacin, Antihyperlipidemic, (NIASPAN PO) Take 1,000 mg by mouth 2 times daily.       nitroglycerin (NITROSTAT) 0.4 MG sublingual tablet Place 1 tablet (0.4 mg) under the tongue every 5 minutes as needed for chest pain 100  "tablet 1     Omega-3 Fatty Acids (OMEGA-3 FISH OIL PO) Take 2,800 g by mouth 2 times daily (with meals).       ramipril (ALTACE) 10 MG capsule Take 1 capsule (10 mg) by mouth 2 times daily 180 capsule 0     Resveratrol 100 MG CAPS Take 100 mg by mouth 2 times daily.       Saw Palmetto, Serenoa repens, (SAW PALMETTO CONCENTRATE OR) Take 500 mg by mouth 2 times daily.       CYANOCOBALAMIN PO Take 1,000 mcg by mouth 2 times daily.       tamsulosin (FLOMAX) 0.4 MG capsule Take 0.4 mg by mouth daily         ALLERGIES     Allergies   Allergen Reactions     Atorvastatin      Muscle aches       PAST MEDICAL, SURGICAL, FAMILY, SOCIAL HISTORY:  History was reviewed and updated as needed, see medical record.    Review of Systems:  A 12-point review of systems was completed, see medical record for detailed review of systems information.    Physical Exam:  Vitals: /62 (BP Location: Right arm, Patient Position: Chair, Cuff Size: Adult Large)  Pulse 62  Ht 1.88 m (6' 2\")  BMI 36.34 kg/m2    Constitutional:  cooperative, alert and oriented, well developed, well nourished, in no acute distress obese      Skin:  warm and dry to the touch, no apparent skin lesions or masses noted        Head:  normocephalic, no masses or lesions        Eyes:  pupils equal and round, conjunctivae and lids unremarkable, sclera white, no xanthalasma, EOMS intact, no nystagmus        ENT:  no pallor or cyanosis, dentition good        Neck:  carotid pulses are full and equal bilaterally, JVP normal, no carotid bruit        Chest:  normal breath sounds, clear to auscultation, normal A-P diameter, normal symmetry, normal respiratory excursion, no use of accessory muscles        Cardiac: normal S1 and S2       grade 2;systolic ejection murmur          Abdomen:  abdomen soft, non-tender, BS normoactive, no mass, no HSM, no bruits        Vascular:                                        Extremities and Back:  no deformities, clubbing, cyanosis, erythema " observed        Neurological:  no gross motor deficits        ASSESSMENT: continue weight loss and exercise  Agree with trial of stopping amlodipine as long as systolic bp 130 or less       RECOMMENDATIONS: trial of stopping amlodipine, check bp on visit with pmd coming up in 1 to 2 weeks.      Recent Lab Results:  LIPID RESULTS:  Lab Results   Component Value Date    CHOL 190 12/06/2017    HDL 32 12/06/2017     12/06/2017    TRIG 229 12/06/2017    CHOLHDLRATIO 3.9 03/13/2014       LIVER ENZYME RESULTS:  Lab Results   Component Value Date    AST 33 12/29/2012    ALT 6 03/13/2014       CBC RESULTS:  Lab Results   Component Value Date    WBC 5.4 12/29/2012    RBC 5.06 12/29/2012    HGB 14.8 12/29/2012    HCT 44.4 12/29/2012    MCV 88 12/29/2012    MCH 29.2 12/29/2012    MCHC 33.3 12/29/2012    RDW 14.5 12/29/2012     12/29/2012       BMP RESULTS:  Lab Results   Component Value Date     12/06/2017    POTASSIUM 3.9 12/06/2017    CHLORIDE 98 12/06/2017    CO2 31 12/06/2017    ANIONGAP 11.9 12/06/2017     (A) 12/06/2017    BUN 12 12/06/2017    CR 0.75 12/06/2017    GFRESTIMATED >90 12/29/2012    GFRESTBLACK >90 12/29/2012    LEIGH ANN 8.8 12/06/2017        A1C RESULTS:  No results found for: A1C    INR RESULTS:  Lab Results   Component Value Date    INR 1.03 05/02/2008    INR 1.04 03/13/2008       We greatly appreciate the opportunity to be involved in the care of your patient, Travis Villa.    Sincerely,  Ambrocio Valdez MD      CC  No referring provider defined for this encounter.                                                                       Thank you for allowing me to participate in the care of your patient.      Sincerely,     Ambrocio Valdez MD     [facility]    cc:   No referring provider defined for this encounter.

## 2018-09-27 NOTE — MR AVS SNAPSHOT
After Visit Summary   9/27/2018    Travis Villa    MRN: [id number]           Patient Information     Date Of Birth          1945        Visit Information        Provider Department      9/27/2018 9:00 AM Ambrocio Valdez MD [facility]        Today's Diagnoses     Coronary artery disease involving native coronary artery of native heart without angina pectoris    -  1    Benign essential hypertension        Coronary artery disease involving native heart, angina presence unspecified, unspecified vessel or lesion type        Essential hypertension, benign           Follow-ups after your visit        Additional Services     Follow-Up with Cardiologist                 Future tests that were ordered for you today     Open Future Orders        Priority Expected Expires Ordered    Follow-Up with Cardiologist Routine 9/27/2019 9/28/2019 9/27/2018            Who to contact     If you have questions or need follow up information about today's clinic visit or your schedule please contact [facility] directly at [phone].  Normal or non-critical lab and imaging results will be communicated to you by MyChart, letter or phone within 4 business days after the clinic has received the results. If you do not hear from us within 7 days, please contact the clinic through MyChart or phone. If you have a critical or abnormal lab result, we will notify you by phone as soon as possible.  Submit refill requests through [facility] or call your pharmacy and they will forward the refill request to us. Please allow 3 business days for your refill to be completed.          Additional Information About Your Visit        Care EveryWhere ID     This is your Care EveryWhere ID. This could be used by other organizations to access your [facility] medical records  [id number]        Your Vitals Were     Pulse Height BMI (Body Mass Index)  "            62 1.88 m (6' 2\") 36.34 kg/m2          Blood Pressure from Last 3 Encounters:   09/27/18 128/62   05/30/17 120/70   04/27/16 142/78    Weight from Last 3 Encounters:   05/30/17 128.4 kg (283 lb)   04/21/16 123.4 kg (272 lb)   03/11/15 128 kg (282 lb 3 oz)                 Today's Medication Changes          These changes are accurate as of 9/27/18  9:45 AM.  If you have any questions, ask your nurse or doctor.               Stop taking these medicines if you haven't already. Please contact your care team if you have questions.     amLODIPine 5 MG tablet   Commonly known as:  NORVASC   Stopped by:  Ambrocio Valdez MD                Where to get your medicines      These medications were sent to [facility]/pharmacy #[id number] [place], [place] - [address] [address]     Phone:  [phone]     carvedilol 25 MG tablet    indapamide 1.25 MG tablet    nitroGLYcerin 0.4 MG sublingual tablet    ramipril 10 MG capsule                Primary Care Provider Office Phone # Fax #    Nomi Gil [phone]       [facility] [address] [facility] [address]  [facility] [address]        Equal Access to Services     LILY BURNS AH: Hadii aad ku hadasho Soomaali, waaxda luqadaha, qaybta kaalmada adeegyada, waxay idiin hayaan adeterrance kharash la'michelle . So [facility] [phone].    ATENCIÓN: Si habla español, tiene a rocha disposición servicios gratuitos de asistencia lingüística. Rodriame al [phone].    We comply with applicable federal civil rights laws and [place] laws. We do not discriminate on the basis of race, color, national origin, age, disability, sex, sexual orientation, or gender identity.            Thank you!     Thank you for choosing [facility]  for your care. Our goal is always to provide you with excellent care. Hearing back from our patients is one way we can continue to improve our services. Please take a few " minutes to complete the written survey that you may receive in the mail after your visit with us. Thank you!             Your Updated Medication List - Protect others around you: Learn how to safely use, store and throw away your medicines at www.disposemymeds.org.          This list is accurate as of 9/27/18  9:45 AM.  Always use your most recent med list.                   Brand Name Dispense Instructions for use Diagnosis    ASPIRIN PO      Take 81 mg by mouth 2 times daily.        calcium-magnesium 500-250 MG Tabs per tablet    CALMAG     Take 1 tablet by mouth 2 times daily (with meals).        carvedilol 25 MG tablet    COREG    180 tablet    Take 1 tablet (25 mg) by mouth 2 times daily (with meals)    Coronary artery disease involving native heart, angina presence unspecified, unspecified vessel or lesion type       COENZYME Q-10 PO      Take 400 mg by mouth daily        CYANOCOBALAMIN PO      Take 1,000 mcg by mouth 2 times daily.        Flaxseed (Linseed) Oil      Take 1,300 mg by mouth 2 times daily.        FLOMAX 0.4 MG capsule   Generic drug:  tamsulosin      Take 0.4 mg by mouth daily        FOLIC ACID PO      Take 400 mcg by mouth 2 times daily        garlic 150 MG Tabs tablet      Take 1,000 mg by mouth 2 times daily.        GLUCOSAMINE SULFATE PO      Take 1,500 mg by mouth 2 times daily.        indapamide 1.25 MG tablet    LOZOL    90 tablet    Take 1 tablet (1.25 mg) by mouth every morning    Coronary artery disease involving native heart, angina presence unspecified, unspecified vessel or lesion type       METFORMIN HCL PO      Take 500 mg by mouth 2 times daily (with meals)        Multi-vitamin Tabs tablet      Take 1 tablet by mouth daily.        NIASPAN PO      Take 1,000 mg by mouth 2 times daily.        nitroGLYcerin 0.4 MG sublingual tablet    NITROSTAT    100 tablet    Place 1 tablet (0.4 mg) under the tongue every 5 minutes as needed for chest pain    Coronary artery disease involving  native heart, angina presence unspecified, unspecified vessel or lesion type       OMEGA-3 FISH OIL PO      Take 2,800 g by mouth 2 times daily (with meals).        ramipril 10 MG capsule    ALTACE    180 capsule    Take 1 capsule (10 mg) by mouth 2 times daily    Essential hypertension, benign       Resveratrol 100 MG Caps      Take 100 mg by mouth 2 times daily.        SAW PALMETTO CONCENTRATE OR      Take 500 mg by mouth 2 times daily.        VITAMIN C PO      Take 1,000 mg by mouth 2 times daily.        VITAMIN D3 PO      Take 5,000 Units by mouth daily.

## 2018-09-27 NOTE — LETTER
9/27/2018      Nomi Gil MD  [facility] [address]  [facility] [address]      RE: Travis Villa       Dear Colleague,    I had the pleasure of seeing Travis Villa in the [facility].    Service Date: 09/27/2018      HISTORY OF PRESENT ILLNESS:  Travis Villa, a 73-year-old man with coronary artery disease, hypertension, type 2 diabetes mellitus, osteoarthritis, obesity, prostatic hypertrophy and mild aortic stenosis, was seen today at your request for followup.      Since I last saw the patient, he has lost about 20 pounds.  There has been marked improvement in his hip pain and he has thus far avoided surgery.  The patient's blood pressure has been much better controlled and he would like to discontinue amlodipine.      PAST MEDICAL HISTORY:   1.  Dyslipidemia, intolerant of all statin drugs after multiple trials.   2.  Coronary artery disease:   a. History of stent implantation, right coronary and circumflex, 2008.   b.  Negative nuclear stress test in 2012 and 2016.   3.  Mild aortic stenosis, echo in 2015 showing a mean systolic gradient of 15.  Normal ejection fraction.  No symptoms of aortic stenosis at that time.   4.  Osteoarthritis, managed medically with injections.  Markedly improved since weight loss.   5.  Diabetes mellitus.   6.  Prostate hypertrophy.      PHYSICAL EXAMINATION:   GENERAL:  Today, demonstrates a very pleasant and cooperative 73-year-old man.   VITAL SIGNS:  His blood pressure is 128/62, his heart rate is 62.  His height is 6 feet 2.  His weight is at 266 pounds, down about 20 pounds by his report.   LUNGS:  Clear to percussion and auscultation.   CARDIOVASCULAR:  Shows a normal S1 with a grade 2/6 ejection murmur.  S2 is normal.      ASSESSMENT:  Mr. Villa is asymptomatic, on excellent medical therapy.  Current ACC guidelines advise targeting a systolic pressure of less than 130mmHg.  I believe it would be reasonable  to give a trial of stopping amlodipine to see if his blood pressure is controlled on carvedilol and ramipril alone.      I have offered that we could  a PCSK9 inhibitor for treatment of dyslipidemia and documented vascular disease in view of his statin intolerance.  He wants to think this issue over and have a lipid profile done in your office in the near future.      RECOMMENDATIONS:   1.  Continue Mediterranean-style weight loss diet, regular exercise program.   2.  We will hold the patient's amlodipine at this time and recheck blood pressure with primary care doctor.  I am optimistic his blood pressure control will remain satisfactory on carvedilol, ramipril and  Indapamide with a target of 130 mmHg.    3.  The patient will get a lipid level drawn in your office.  If he wishes to consider a PCSK9 inhibitor, we will be happy to assist him with this process.  Otherwise, plan a followup visit in about 1 year.      We have appreciated the opportunity to see your patient, Catracho Mike.       Ambrocio Valdez MD       cc:   Nomi Gil MD    [address], [address] [place] [address]         AMBROCIO VALDEZ MD             D: 2018   T: 2018   MT: DANY      Name:     CATRACHO MIKE   MRN:      [id number]        Account:      [id number]   :      1945           Service Date: 2018      Document: [id number]           Outpatient Encounter Prescriptions as of 2018   Medication Sig Dispense Refill     Ascorbic Acid (VITAMIN C PO) Take 1,000 mg by mouth 2 times daily.       ASPIRIN PO Take 81 mg by mouth 2 times daily.       calcium-magnesium (CALMAG) 500-250 MG TABS Take 1 tablet by mouth 2 times daily (with meals).       carvedilol (COREG) 25 MG tablet Take 1 tablet (25 mg) by mouth 2 times daily (with meals) 180 tablet 0     Cholecalciferol (VITAMIN D3 PO) Take 5,000 Units by mouth daily.       COENZYME Q-10 PO Take 400 mg by mouth daily        Flaxseed,  Linseed, OIL Take 1,300 mg by mouth 2 times daily.       FOLIC ACID PO Take 400 mcg by mouth 2 times daily        garlic 150 MG TABS Take 1,000 mg by mouth 2 times daily.       GLUCOSAMINE SULFATE PO Take 1,500 mg by mouth 2 times daily.       indapamide (LOZOL) 1.25 MG tablet Take 1 tablet (1.25 mg) by mouth every morning 90 tablet 0     METFORMIN HCL PO Take 500 mg by mouth 2 times daily (with meals)        multivitamin, therapeutic with minerals (MULTI-VITAMIN) TABS Take 1 tablet by mouth daily.       Niacin, Antihyperlipidemic, (NIASPAN PO) Take 1,000 mg by mouth 2 times daily.       nitroGLYcerin (NITROSTAT) 0.4 MG sublingual tablet Place 1 tablet (0.4 mg) under the tongue every 5 minutes as needed for chest pain 100 tablet 1     Omega-3 Fatty Acids (OMEGA-3 FISH OIL PO) Take 2,800 g by mouth 2 times daily (with meals).       ramipril (ALTACE) 10 MG capsule Take 1 capsule (10 mg) by mouth 2 times daily 180 capsule 0     Resveratrol 100 MG CAPS Take 100 mg by mouth 2 times daily.       Saw Palmetto, Serenoa repens, (SAW PALMETTO CONCENTRATE OR) Take 500 mg by mouth 2 times daily.       CYANOCOBALAMIN PO Take 1,000 mcg by mouth 2 times daily.       tamsulosin (FLOMAX) 0.4 MG capsule Take 0.4 mg by mouth daily       [DISCONTINUED] amLODIPine (NORVASC) 5 MG tablet Take 1 tablet (5 mg) by mouth daily 30 tablet 0     [DISCONTINUED] carvedilol (COREG) 25 MG tablet Take 1 tablet (25 mg) by mouth 2 times daily (with meals) 180 tablet 0     [DISCONTINUED] indapamide (LOZOL) 1.25 MG tablet Take 1 tablet (1.25 mg) by mouth every morning 90 tablet 0     [DISCONTINUED] nitroglycerin (NITROSTAT) 0.4 MG sublingual tablet Place 1 tablet (0.4 mg) under the tongue every 5 minutes as needed for chest pain 100 tablet 1     [DISCONTINUED] ramipril (ALTACE) 10 MG capsule Take 1 capsule (10 mg) by mouth 2 times daily 180 capsule 0     No facility-administered encounter medications on file as of 9/27/2018.        Again, thank you for  allowing me to participate in the care of your patient.      Sincerely,    Ambrocio Valdez MD     [facility]

## 2019-02-22 ENCOUNTER — TELEPHONE (OUTPATIENT)
Dept: CARDIOLOGY | Facility: CLINIC | Age: 74
End: 2019-02-22

## 2019-02-22 NOTE — TELEPHONE ENCOUNTER
Received call from pt wanting to discuss his blood pressures. Pt reports that yesterday when he was at [employer], he took his BP and it was 166/80 and he was surprised it was that high because it's usually 120-130s/60-70s. Pt went home and took his BP on his home monitor and it was reading around the same. Pt then took his BP this morning and it was 170/87. Pt then took his morning medications and waited a few hours and then took his BP again and it was 133/78. Pt said that he was unable to get in with his PCP and was wondering if he should see Dr. Valdez. Explained to pt that since his BP did go down, he should continue to monitor it a few times daily which he already does. Informed pt that if it continues to stay elevated then he should make an appt with his PCP. Pt reports no chest pain, SOB, or dizziness/lightheadedness. Pt said that he has been having some back pain and been stressed from the snow. Pt agreed to monitor his BP and follow up with his PCP.

## 2019-02-27 ENCOUNTER — HOSPITAL ENCOUNTER (OUTPATIENT)
Facility: CLINIC | Age: 74
Setting detail: OBSERVATION
Discharge: HOME | End: 2019-02-28
Attending: EMERGENCY MEDICINE | Admitting: HOSPITALIST
Payer: COMMERCIAL

## 2019-02-27 ENCOUNTER — APPOINTMENT (OUTPATIENT)
Dept: GENERAL RADIOLOGY | Facility: CLINIC | Age: 74
End: 2019-02-27
Attending: EMERGENCY MEDICINE
Payer: COMMERCIAL

## 2019-02-27 DIAGNOSIS — E78.2 MIXED HYPERLIPIDEMIA: ICD-10-CM

## 2019-02-27 DIAGNOSIS — R07.9 CHEST PAIN, UNSPECIFIED TYPE: ICD-10-CM

## 2019-02-27 DIAGNOSIS — I25.10 CORONARY ARTERY DISEASE INVOLVING NATIVE CORONARY ARTERY OF NATIVE HEART WITHOUT ANGINA PECTORIS: ICD-10-CM

## 2019-02-27 DIAGNOSIS — R07.89 CHEST PRESSURE: Primary | ICD-10-CM

## 2019-02-27 LAB
ANION GAP SERPL CALCULATED.3IONS-SCNC: 8 MMOL/L (ref 3–14)
BASOPHILS # BLD AUTO: 0 10E9/L (ref 0–0.2)
BASOPHILS NFR BLD AUTO: 0.3 %
BUN SERPL-MCNC: 14 MG/DL (ref 7–30)
CALCIUM SERPL-MCNC: 8.9 MG/DL (ref 8.5–10.1)
CHLORIDE SERPL-SCNC: 96 MMOL/L (ref 94–109)
CO2 SERPL-SCNC: 30 MMOL/L (ref 20–32)
CREAT SERPL-MCNC: 0.46 MG/DL (ref 0.66–1.25)
DIFFERENTIAL METHOD BLD: NORMAL
EOSINOPHIL # BLD AUTO: 0.1 10E9/L (ref 0–0.7)
EOSINOPHIL NFR BLD AUTO: 1.2 %
ERYTHROCYTE [DISTWIDTH] IN BLOOD BY AUTOMATED COUNT: 14.3 % (ref 10–15)
GFR SERPL CREATININE-BSD FRML MDRD: >90 ML/MIN/{1.73_M2}
GLUCOSE SERPL-MCNC: 149 MG/DL (ref 70–99)
HCT VFR BLD AUTO: 42.3 % (ref 40–53)
HGB BLD-MCNC: 14.3 G/DL (ref 13.3–17.7)
IMM GRANULOCYTES # BLD: 0 10E9/L (ref 0–0.4)
IMM GRANULOCYTES NFR BLD: 0.3 %
INTERPRETATION ECG - MUSE: NORMAL
LYMPHOCYTES # BLD AUTO: 2 10E9/L (ref 0.8–5.3)
LYMPHOCYTES NFR BLD AUTO: 26.4 %
MCH RBC QN AUTO: 28.1 PG (ref 26.5–33)
MCHC RBC AUTO-ENTMCNC: 33.8 G/DL (ref 31.5–36.5)
MCV RBC AUTO: 83 FL (ref 78–100)
MONOCYTES # BLD AUTO: 0.7 10E9/L (ref 0–1.3)
MONOCYTES NFR BLD AUTO: 8.7 %
NEUTROPHILS # BLD AUTO: 4.7 10E9/L (ref 1.6–8.3)
NEUTROPHILS NFR BLD AUTO: 63.1 %
NRBC # BLD AUTO: 0 10*3/UL
NRBC BLD AUTO-RTO: 0 /100
PLATELET # BLD AUTO: 182 10E9/L (ref 150–450)
POTASSIUM SERPL-SCNC: 4 MMOL/L (ref 3.4–5.3)
RBC # BLD AUTO: 5.08 10E12/L (ref 4.4–5.9)
SODIUM SERPL-SCNC: 134 MMOL/L (ref 133–144)
TROPONIN I SERPL-MCNC: <0.015 UG/L (ref 0–0.04)
WBC # BLD AUTO: 7.4 10E9/L (ref 4–11)

## 2019-02-27 PROCEDURE — 25000132 ZZH RX MED GY IP 250 OP 250 PS 637: Performed by: EMERGENCY MEDICINE

## 2019-02-27 PROCEDURE — G0378 HOSPITAL OBSERVATION PER HR: HCPCS

## 2019-02-27 PROCEDURE — 99220 ZZC INITIAL OBSERVATION CARE,LEVL III: CPT | Performed by: HOSPITALIST

## 2019-02-27 PROCEDURE — 71046 X-RAY EXAM CHEST 2 VIEWS: CPT

## 2019-02-27 PROCEDURE — 99285 EMERGENCY DEPT VISIT HI MDM: CPT | Mod: 25

## 2019-02-27 PROCEDURE — 36415 COLL VENOUS BLD VENIPUNCTURE: CPT | Performed by: HOSPITALIST

## 2019-02-27 PROCEDURE — 80048 BASIC METABOLIC PNL TOTAL CA: CPT | Performed by: EMERGENCY MEDICINE

## 2019-02-27 PROCEDURE — 84484 ASSAY OF TROPONIN QUANT: CPT | Performed by: EMERGENCY MEDICINE

## 2019-02-27 PROCEDURE — 93005 ELECTROCARDIOGRAM TRACING: CPT

## 2019-02-27 PROCEDURE — 85025 COMPLETE CBC W/AUTO DIFF WBC: CPT | Performed by: EMERGENCY MEDICINE

## 2019-02-27 PROCEDURE — 84484 ASSAY OF TROPONIN QUANT: CPT | Performed by: HOSPITALIST

## 2019-02-27 RX ORDER — ALUMINA, MAGNESIA, AND SIMETHICONE 2400; 2400; 240 MG/30ML; MG/30ML; MG/30ML
30 SUSPENSION ORAL EVERY 4 HOURS PRN
Status: DISCONTINUED | OUTPATIENT
Start: 2019-02-27 | End: 2019-02-28 | Stop reason: HOSPADM

## 2019-02-27 RX ORDER — INDAPAMIDE 1.25 MG/1
1.25 TABLET ORAL EVERY MORNING
Status: DISCONTINUED | OUTPATIENT
Start: 2019-02-28 | End: 2019-02-28 | Stop reason: HOSPADM

## 2019-02-27 RX ORDER — ACETAMINOPHEN 650 MG/1
650 SUPPOSITORY RECTAL EVERY 4 HOURS PRN
Status: DISCONTINUED | OUTPATIENT
Start: 2019-02-27 | End: 2019-02-28 | Stop reason: HOSPADM

## 2019-02-27 RX ORDER — NITROGLYCERIN 0.4 MG/1
0.4 TABLET SUBLINGUAL EVERY 5 MIN PRN
Status: DISCONTINUED | OUTPATIENT
Start: 2019-02-27 | End: 2019-02-28 | Stop reason: HOSPADM

## 2019-02-27 RX ORDER — RAMIPRIL 10 MG/1
10 CAPSULE ORAL 2 TIMES DAILY
Status: DISCONTINUED | OUTPATIENT
Start: 2019-02-28 | End: 2019-02-28 | Stop reason: HOSPADM

## 2019-02-27 RX ORDER — HYDRALAZINE HYDROCHLORIDE 20 MG/ML
10 INJECTION INTRAMUSCULAR; INTRAVENOUS EVERY 4 HOURS PRN
Status: DISCONTINUED | OUTPATIENT
Start: 2019-02-27 | End: 2019-02-28 | Stop reason: HOSPADM

## 2019-02-27 RX ORDER — DEXTROSE MONOHYDRATE 25 G/50ML
25-50 INJECTION, SOLUTION INTRAVENOUS
Status: DISCONTINUED | OUTPATIENT
Start: 2019-02-27 | End: 2019-02-28 | Stop reason: HOSPADM

## 2019-02-27 RX ORDER — CARVEDILOL 12.5 MG/1
25 TABLET ORAL 2 TIMES DAILY WITH MEALS
Status: DISCONTINUED | OUTPATIENT
Start: 2019-02-28 | End: 2019-02-28 | Stop reason: HOSPADM

## 2019-02-27 RX ORDER — NICOTINE POLACRILEX 4 MG
15-30 LOZENGE BUCCAL
Status: DISCONTINUED | OUTPATIENT
Start: 2019-02-27 | End: 2019-02-28 | Stop reason: HOSPADM

## 2019-02-27 RX ORDER — AMLODIPINE BESYLATE 2.5 MG/1
5 TABLET ORAL 2 TIMES DAILY
COMMUNITY
End: 2019-05-20

## 2019-02-27 RX ORDER — ACETAMINOPHEN 325 MG/1
650 TABLET ORAL EVERY 4 HOURS PRN
Status: DISCONTINUED | OUTPATIENT
Start: 2019-02-27 | End: 2019-02-28 | Stop reason: HOSPADM

## 2019-02-27 RX ORDER — ASPIRIN 81 MG/1
81 TABLET ORAL DAILY
Status: DISCONTINUED | OUTPATIENT
Start: 2019-02-28 | End: 2019-02-28 | Stop reason: HOSPADM

## 2019-02-27 RX ORDER — NITROGLYCERIN 0.4 MG/1
0.4 TABLET SUBLINGUAL EVERY 5 MIN PRN
Status: DISCONTINUED | OUTPATIENT
Start: 2019-02-27 | End: 2019-02-27

## 2019-02-27 RX ORDER — LIDOCAINE 40 MG/G
CREAM TOPICAL
Status: DISCONTINUED | OUTPATIENT
Start: 2019-02-27 | End: 2019-02-28 | Stop reason: HOSPADM

## 2019-02-27 RX ORDER — ASPIRIN 81 MG/1
263 TABLET, CHEWABLE ORAL ONCE
Status: COMPLETED | OUTPATIENT
Start: 2019-02-27 | End: 2019-02-27

## 2019-02-27 RX ORDER — NALOXONE HYDROCHLORIDE 0.4 MG/ML
.1-.4 INJECTION, SOLUTION INTRAMUSCULAR; INTRAVENOUS; SUBCUTANEOUS
Status: DISCONTINUED | OUTPATIENT
Start: 2019-02-27 | End: 2019-02-28 | Stop reason: HOSPADM

## 2019-02-27 RX ORDER — AMLODIPINE BESYLATE 2.5 MG/1
2.5 TABLET ORAL 2 TIMES DAILY
Status: DISCONTINUED | OUTPATIENT
Start: 2019-02-28 | End: 2019-02-28 | Stop reason: HOSPADM

## 2019-02-27 RX ADMIN — ASPIRIN 81 MG 243 MG: 81 TABLET ORAL at 19:23

## 2019-02-27 SDOH — HEALTH STABILITY: MENTAL HEALTH: HOW OFTEN DO YOU HAVE A DRINK CONTAINING ALCOHOL?: NEVER

## 2019-02-27 ASSESSMENT — ENCOUNTER SYMPTOMS
NAUSEA: 0
DIAPHORESIS: 0
NUMBNESS: 0
VOMITING: 0
SHORTNESS OF BREATH: 0

## 2019-02-27 ASSESSMENT — MIFFLIN-ST. JEOR: SCORE: 1961.42

## 2019-02-28 ENCOUNTER — APPOINTMENT (OUTPATIENT)
Dept: NUCLEAR MEDICINE | Facility: CLINIC | Age: 74
End: 2019-02-28
Attending: HOSPITALIST
Payer: COMMERCIAL

## 2019-02-28 VITALS
BODY MASS INDEX: 35.61 KG/M2 | WEIGHT: 262.9 LBS | DIASTOLIC BLOOD PRESSURE: 77 MMHG | OXYGEN SATURATION: 93 % | SYSTOLIC BLOOD PRESSURE: 108 MMHG | RESPIRATION RATE: 16 BRPM | HEART RATE: 74 BPM | TEMPERATURE: 97.9 F | HEIGHT: 72 IN

## 2019-02-28 LAB
GLUCOSE BLDC GLUCOMTR-MCNC: 135 MG/DL (ref 70–99)
GLUCOSE BLDC GLUCOMTR-MCNC: 153 MG/DL (ref 70–99)
TROPONIN I SERPL-MCNC: <0.015 UG/L (ref 0–0.04)
TROPONIN I SERPL-MCNC: <0.015 UG/L (ref 0–0.04)

## 2019-02-28 PROCEDURE — 99217 ZZC OBSERVATION CARE DISCHARGE: CPT | Performed by: PHYSICIAN ASSISTANT

## 2019-02-28 PROCEDURE — A9502 TC99M TETROFOSMIN: HCPCS | Performed by: HOSPITALIST

## 2019-02-28 PROCEDURE — 40000863 ZZH STATISTIC RADIOLOGY XRAY, US, CT, MAR, NM

## 2019-02-28 PROCEDURE — 93016 CV STRESS TEST SUPVJ ONLY: CPT | Performed by: INTERNAL MEDICINE

## 2019-02-28 PROCEDURE — 93005 ELECTROCARDIOGRAM TRACING: CPT

## 2019-02-28 PROCEDURE — 25000128 H RX IP 250 OP 636: Performed by: INTERNAL MEDICINE

## 2019-02-28 PROCEDURE — 00000146 ZZHCL STATISTIC GLUCOSE BY METER IP

## 2019-02-28 PROCEDURE — 93017 CV STRESS TEST TRACING ONLY: CPT

## 2019-02-28 PROCEDURE — 93018 CV STRESS TEST I&R ONLY: CPT | Performed by: INTERNAL MEDICINE

## 2019-02-28 PROCEDURE — 93010 ELECTROCARDIOGRAM REPORT: CPT | Performed by: INTERNAL MEDICINE

## 2019-02-28 PROCEDURE — 25000132 ZZH RX MED GY IP 250 OP 250 PS 637: Performed by: HOSPITALIST

## 2019-02-28 PROCEDURE — 36415 COLL VENOUS BLD VENIPUNCTURE: CPT | Performed by: HOSPITALIST

## 2019-02-28 PROCEDURE — 84484 ASSAY OF TROPONIN QUANT: CPT | Performed by: HOSPITALIST

## 2019-02-28 PROCEDURE — G0378 HOSPITAL OBSERVATION PER HR: HCPCS

## 2019-02-28 PROCEDURE — 78452 HT MUSCLE IMAGE SPECT MULT: CPT | Mod: 26 | Performed by: INTERNAL MEDICINE

## 2019-02-28 PROCEDURE — 78452 HT MUSCLE IMAGE SPECT MULT: CPT

## 2019-02-28 PROCEDURE — 34300033 ZZH RX 343: Performed by: HOSPITALIST

## 2019-02-28 RX ORDER — ALBUTEROL SULFATE 90 UG/1
2 AEROSOL, METERED RESPIRATORY (INHALATION) EVERY 5 MIN PRN
Status: DISCONTINUED | OUTPATIENT
Start: 2019-02-28 | End: 2019-02-28 | Stop reason: HOSPADM

## 2019-02-28 RX ORDER — ACYCLOVIR 200 MG/1
0-1 CAPSULE ORAL
Status: DISCONTINUED | OUTPATIENT
Start: 2019-02-28 | End: 2019-02-28 | Stop reason: HOSPADM

## 2019-02-28 RX ORDER — UBIDECARENONE 60 MG
60 CAPSULE ORAL AT BEDTIME
Qty: 30 CAPSULE | Refills: 0 | Status: SHIPPED | OUTPATIENT
Start: 2019-02-28 | End: 2023-02-15

## 2019-02-28 RX ORDER — REGADENOSON 0.08 MG/ML
0.4 INJECTION, SOLUTION INTRAVENOUS ONCE
Status: COMPLETED | OUTPATIENT
Start: 2019-02-28 | End: 2019-02-28

## 2019-02-28 RX ORDER — AMINOPHYLLINE 25 MG/ML
50-100 INJECTION, SOLUTION INTRAVENOUS
Status: DISCONTINUED | OUTPATIENT
Start: 2019-02-28 | End: 2019-02-28 | Stop reason: HOSPADM

## 2019-02-28 RX ADMIN — REGADENOSON 0.4 MG: 0.08 INJECTION, SOLUTION INTRAVENOUS at 09:14

## 2019-02-28 RX ADMIN — RAMIPRIL 10 MG: 10 CAPSULE ORAL at 08:03

## 2019-02-28 RX ADMIN — INDAPAMIDE 1.25 MG: 1.25 TABLET, FILM COATED ORAL at 11:33

## 2019-02-28 RX ADMIN — ASPIRIN 81 MG: 81 TABLET, COATED ORAL at 08:03

## 2019-02-28 RX ADMIN — AMLODIPINE BESYLATE 2.5 MG: 2.5 TABLET ORAL at 08:03

## 2019-02-28 RX ADMIN — TETROFOSMIN 33.1 MCI.: 1.38 INJECTION, POWDER, LYOPHILIZED, FOR SOLUTION INTRAVENOUS at 09:19

## 2019-02-28 RX ADMIN — CARVEDILOL 25 MG: 12.5 TABLET, FILM COATED ORAL at 11:33

## 2019-02-28 RX ADMIN — TETROFOSMIN 13 MCI.: 1.38 INJECTION, POWDER, LYOPHILIZED, FOR SOLUTION INTRAVENOUS at 07:15

## 2019-02-28 ASSESSMENT — MIFFLIN-ST. JEOR: SCORE: 1965.51

## 2019-02-28 NOTE — PROGRESS NOTES
"Resting Lexiscan.  Pt tolerated very well with only sl \"funny feeling\" in stomach which is resolving. Baseline headache unchanged. No other c/o. Called pt's nurse Hakeem and told her about low room air sats and she will assess.  See flow sheet. Pt to room by EKG staff.  "

## 2019-02-28 NOTE — ED PROVIDER NOTES
History     Chief Complaint:  Chest Pain     HPI   Travis Villa is a 73 year old male, with a history of myocardial infarction, CAD, hypertension, hyperlipidemia, type 2 diabetes, s/p stents x3, and on baby Aspirin BID, who presents with his wife to the ED for evaluation of substernal chest pain. The patient reports he developed chest pain after snowblowing at 5:00PM today. The pain was a pressure sensation. He took his blood pressure which was elevated at 204/103. He subsequently took his BP medications and rested with resolution of pain. The patient notes he was sitting when his chest pressure returned. The patient denies any diaphoresis, nausea, vomiting, shortness of breath, numbness, or tingling.  Sx are the same as to those prior to needing the stents.    Allergies:  Atorvastatin: myalgia      Medications:    Vitamin  C  Aspirin 81mg   Calmag  Coreg  Vitamin D3  CoQ10  Vitamin B12  Folic acid  Glucosamine  Indapamide  Metformin  Multivitamin  Niaspan  Nitrostat  Fish oil  Ramipril  Resveratrol  Flomax     Past Medical History:    Heart murmur   Type 2 DM  MI  Ostearthritis   GERD  BPH  CAD  HLD  HTN    Past Surgical History:    Cardiac stent x3  Heart cath angioplasty     Family History:    CAD  MI  Pancreatic cancer     Social History:  Smoking status: Never smoker    Alcohol use: Occasionally   Presents to ED with wife    Marital Status:   [2]     Review of Systems   Constitutional: Negative for diaphoresis.   Respiratory: Negative for shortness of breath.    Cardiovascular: Positive for chest pain.   Gastrointestinal: Negative for nausea and vomiting.   Neurological: Negative for numbness.   All other systems reviewed and are negative.    Physical Exam     Patient Vitals for the past 24 hrs:   BP Temp Temp src Pulse Heart Rate Resp SpO2 Height Weight   02/27/19 2100 (!) 168/91 -- -- 60 63 15 92 % -- --   02/27/19 2000 147/80 -- -- -- 63 25 92 % -- --   02/27/19 1850 (!) 167/91 98.5  F (36.9   C) Oral 68 -- 18 96 % 1.829 m (6') 118.8 kg (262 lb)     Physical Exam  General/Appearance: appears stated age, well-groomed, appears comfortable  Eyes: EOMI, no scleral injection, no icterus  ENT: MMM  Neck: supple, nl ROM, no stiffness  Cardiovascular: RRR, nl S1S2, no m/r/g, 2+ pulses in all 4 extremities, cap refill <2sec  Respiratory: CTAB, good air movement throughout, no wheezes/rhonchi/rales, no increased WOB, no retractions  Back: no lesions  GI: abd soft, non-distended, nttp,  no HSM, no rebound, no guarding, nl BS  MSK: KRUEGER, good tone, no bony abnormality  Skin: warm and well-perfused, no rash, no edema, no ecchymosis, nl turgor  Neuro: GCS 15, alert and oriented, no gross focal neuro deficits  Psych: interacts appropriately  Heme: no petechia, no purpura, no active bleeding    Emergency Department Course     ECG (18:53:39):  Rate 69 bpm. MS interval 188. QRS duration 98. QT/QTc 412/441. P-R-T axes 14 61 31. Normal sinus rhythm. Cannot rule out anterior infarct, age undetermined. Abnormal ECG. Interpreted at 1900 by Wendy Major MD.    Imaging:  Radiographic findings were communicated with the patient and family who voiced understanding of the findings.    XR Chest 2 Views  IMPRESSION: No change. No active disease. There is some very minimal  chronic densities in the right lung base which are presumably  scarring. As read by Radiology.     Laboratory:  Troponin I now (1907): <0.015    CBC: o/w WNL (WBC 7.4, HGB 14.3, )  BMP: Glucose 149(H), Creatinine 0.46(L), o/w WNL     Interventions:  1923: Aspirin 243mg PO    Emergency Department Course:  Past medical records, nursing notes, and vitals reviewed.  1903: I performed an exam of the patient and obtained history, as documented above.    IV inserted and blood drawn.    The patient was sent for a chest x-ray while in the emergency department, findings above.    2001: I rechecked the patient. Explained findings to patient.    2020:  Hospitalist paged.     2024: I spoke to Dr. Rosado of the hospitalist service who accepts the patient for admission.     Findings and plan explained to the Patient and spouse who consents to admission. Discussed the patient with Dr. Rosado, who will admit the patient to an obs bed for further monitoring, evaluation, and treatment.     Impression & Plan      Medical Decision Making:  This patient is a 73-year-old male on baby aspirin twice a day status post 3 stents, who presents for chest pressure.  Symptoms began after exerting himself snowblowing.  They resolved with rest however started to return slightly upon ED arrival.  They resolved with sublingual nitro.  He has received the clinical full dose equivalent of a full aspirin today.  Troponin and EKG are unremarkable however I am concerned as his chest pressure is similar, albeit less intense, than the symptoms that prompted his admission before with requirement of multiple stents.  I think he would benefit from coming in for serial troponins, possible stress test, possible cardiology consultation.    Diagnosis:    ICD-10-CM   1. Chest pain, unspecified type R07.9     Disposition: Patient admitted to an obs bed by Dr. Alonzo Lizarraga  2/27/2019    EMERGENCY DEPARTMENT    Scribe Disclosure:  I, Katerin Lizarraga, am serving as a scribe at 7:03 PM on 2/27/2019 to document services personally performed by Wendy Major MD based on my observations and the provider's statements to me.        Wendy Major MD  02/27/19 [id number]

## 2019-02-28 NOTE — ED NOTES
"[facility]  ED Nurse Handoff Report    ED Chief complaint: Chest Pain (substernal chest pain since 1700 after snowblowing )      ED Diagnosis:   Final diagnoses:   Chest pain, unspecified type       Code Status: See prior    Allergies:   Allergies   Allergen Reactions     Atorvastatin      Muscle aches       Activity level - Baseline/Home:  Independent    Activity Level - Current:   Independent     Needed?: No    Isolation: No  Infection: Not Applicable  Bariatric?: No    Vital Signs:   Vitals:    02/27/19 1850 02/27/19 2000   BP: (!) 167/91 147/80   Pulse: 68    Resp: 18 25   Temp: 98.5  F (36.9  C)    TempSrc: Oral    SpO2: 96% 92%   Weight: 118.8 kg (262 lb)    Height: 1.829 m (6')        Cardiac Rhythm: ,   Cardiac  Cardiac Rhythm: Normal sinus rhythm    Pain level: 0-10 Pain Scale: 1    Is this patient confused?: No   Does this patient have a guardian?  No         If yes, is there guardianship documents in the Epic \"Code/ACP\" activity?  N/A         Guardian Notified?  N/A  [place] - Suicide Severity Rating Scale Completed?  Yes  If yes, what color did the patient score?  White    Patient Report: Initial Complaint: CP  Focused Assessment: Pt was removing snow from drive way by using a  when he developed substernal CP. Pt has Cardiac Hx. Upon arrival to ED he now has no CP. Trop Neg. Ambulating independently without difficulty.  Tests Performed: labs and CXR  Abnormal Results:   Labs Ordered and Resulted from Time of ED Arrival Up to the Time of Departure from the ED   BASIC METABOLIC PANEL - Abnormal; Notable for the following components:       Result Value    Glucose 149 (*)     Creatinine 0.46 (*)     All other components within normal limits   CBC WITH PLATELETS DIFFERENTIAL   TROPONIN I   CARDIAC CONTINUOUS MONITORING       Treatments provided: ASA See MAr    Family Comments: Wife at bedside    OBS brochure/video discussed/provided to patient/family: N/A              " Name of person given brochure if not patient: N/A              Relationship to patient: N/A    ED Medications:   Medications   nitroGLYcerin (NITROSTAT) sublingual tablet 0.4 mg (not administered)   aspirin (ASA) chewable tablet 243 mg (243 mg Oral Given 2/27/19 1923)       Drips infusing?:  No    For the majority of the shift this patient was Green.   Interventions performed were Routine ED cares.    Severe Sepsis OR Septic Shock Diagnosis Present: No    To be done/followed up on inpatient unit:  N/A    ED NURSE PHONE NUMBER: *[phone]

## 2019-02-28 NOTE — H&P
Admitted:     02/27/2019      PRIMARY CARE PHYSICIAN:  Dr. Nomi Gil.      CHIEF COMPLAINT:  Chest pressure.      HISTORY OF PRESENT ILLNESS:  Mr. Travis Villa is a morbidly obese, pleasant, 73-year-old male with a past medical history significant for coronary artery disease, hypertension, dyslipidemia and diabetes, who presented to the ER today with complaints of chest pressures.  He had stent placement in 2008 and has been doing okay after that.  He had a negative stress test in 2012 and in 2016.  After snow blowing today, he started having chest pressure.  He checked his blood pressure and it was markedly elevated at 204/103.  He took his blood pressure medications, which improved his blood pressure, but continued to have some chest pressure, although slightly improved.  He thus presented to the ER for further evaluation.  He denies any fever, chills or rigors.  He denies any shortness of breath.  No pain in the abdomen.  He reports normal bowel and bladder habit.  Here in the ER, he was seen by Dr. Major.  Initial workup was unremarkable.  He was given 3 baby aspirins.  Hospitalist was requested at admission for further evaluation.      REVIEW OF SYSTEMS:  A 10-point review of system was done and was negative, apart from those mentioned in the history of present illness.      PAST MEDICAL HISTORY:   1.  CAD, status post stents in 2008.   2.  Hypertension.   3.  Diabetes mellitus.   4.  Dyslipidemia, intolerant to statins.      MEDICATIONS PRIOR TO ADMISSION:  Prior to Admission medications    Medication Sig Last Dose Taking? Auth Provider   amLODIPine (NORVASC) 2.5 MG tablet Take 2.5 mg by mouth 2 times daily 2/27/2019 at x2 Yes Unknown, Entered By History   Ascorbic Acid (VITAMIN C PO) Take 1,000 mg by mouth daily  2/27/2019 at Unknown time Yes Unknown, Entered By History   ASPIRIN PO Take 81 mg by mouth 2 times daily. 2/27/2019 at x2 Yes Reported, Patient   calcium-magnesium (CALMAG) 500-250 MG TABS  Take 1 tablet by mouth At Bedtime  2/26/2019 at Unknown time Yes Unknown, Entered By History   carvedilol (COREG) 25 MG tablet Take 1 tablet (25 mg) by mouth 2 times daily (with meals) 2/27/2019 at x2 Yes Ambrocio Valdez MD   Cholecalciferol (VITAMIN D3 PO) Take 10,000 Units by mouth At Bedtime  2/26/2019 at Unknown time Yes Unknown, Entered By History   Coenzyme Q-10 60 MG CAPS Take 60 mg by mouth At Bedtime  Yes Barthell, Joanna Kersten Ulmen, PA-C   Flaxseed, Linseed, OIL Take 1,300 mg by mouth daily  2/26/2019 at Unknown time Yes Unknown, Entered By History   FOLIC ACID PO Take 400 mcg by mouth At Bedtime  2/26/2019 at Unknown time Yes Unknown, Entered By History   garlic 150 MG TABS Take by mouth At Bedtime  2/26/2019 at Unknown time Yes Unknown, Entered By History   GLUCOSAMINE SULFATE PO Take 1,500 mg by mouth daily  2/27/2019 at Unknown time Yes Unknown, Entered By History   indapamide (LOZOL) 1.25 MG tablet Take 1 tablet (1.25 mg) by mouth every morning 2/27/2019 at Unknown time Yes Ambrocio Valdez MD   METFORMIN HCL PO Take 500 mg by mouth 2 times daily (with meals)  2/27/2019 at x2 Yes Reported, Patient   multivitamin, therapeutic with minerals (MULTI-VITAMIN) TABS Take 1 tablet by mouth daily. 2/27/2019 at Unknown time Yes Unknown, Entered By History   Niacin, Antihyperlipidemic, (NIASPAN PO) Take 500 mg by mouth 2 times daily  2/27/2019 at am Yes Reported, Patient   nitroGLYcerin (NITROSTAT) 0.4 MG sublingual tablet Place 1 tablet (0.4 mg) under the tongue every 5 minutes as needed for chest pain  Yes Ambrocio Valdez MD   Omega-3 Fatty Acids (OMEGA-3 FISH OIL PO) Take 2 g by mouth 2 times daily (with meals)  2/27/2019 at am Yes Unknown, Entered By History   ramipril (ALTACE) 10 MG capsule Take 1 capsule (10 mg) by mouth 2 times daily 2/27/2019 at c2 Yes Ambrocio Valdez MD   Resveratrol 100 MG CAPS Take 100 mg by mouth At Bedtime  2/26/2019 at Unknown time Yes Unknown, Entered By  History   Saw Palmetto, Serenoa repens, (SAW PALMETTO CONCENTRATE OR) Take 500 mg by mouth At Bedtime  2/26/2019 at Unknown time Yes Unknown, Entered By History           ALLERGIES:  HE HAS INTOLERANCE TO STATINS.      SOCIAL HISTORY:  He denies smoking, alcohol or illicit drug use.      FAMILY HISTORY:  Reviewed and not pertinent to current presentation.      PHYSICAL EXAMINATION:   GENERAL:  The patient is conscious, alert, oriented x 3, lying comfortably in bed in no apparent distress.   VITAL SIGNS:  Temperature 98.5, heart rate of 68, blood pressure 147/80, saturation 92% on room air.   HEENT:  Pupils are equal and reactive to light and accommodation.  Extraocular movements are intact.  Oral mucosa is moist.   NECK:  Supple, no raised JVD.   RESPIRATORY:  Lung sounds bilaterally clear to auscultation, no wheezes or crepitation.   CARDIOVASCULAR:  Normal S1, S2, regular rate and rhythm, no murmur.   ABDOMEN:  Soft, nontender, nondistended, no guarding, rigidity or rebound tenderness.   LOWER EXTREMITIES:  With no edema.   NEUROLOGIC:  No focal neurological deficits noted.  Cranial nerves II-XII grossly intact.   PSYCHIATRIC:  Normal mood and affect.      LABORATORY DATA:  Reviewed in Epic.  CBC and BMP reviewed and are mostly unremarkable.  Troponin negative x 1.  Blood glucose was 149.  Chest x-ray reviewed by me shows no acute infiltrate, effusion or pneumothorax.  EKG reviewed by me shows a normal sinus rhythm, no acute ST-T wave changes.      ASSESSMENT AND PLAN:  Mr. Travis Villa is a morbidly obese 73-year-old male with a past medical history significant for CAD with stents, hypertension, dyslipidemia and diabetes, who presented to the ER with chest pressure.      1.  Chest pressure.   2.  History of coronary artery disease, status post stents in 2008.   3.  Hypertension.   4.  Dyslipidemia.    - We will admit him for observation.  He is currently chest pain-free.  We will follow serial troponins and  monitor on telemetry.  He does have high risk factors including morbid obesity, hypertension, diabetes, dyslipidemia and prior stents.  So will obtain a Lexiscan stress test.  If he rules out for ACS with serial troponins, we will resume his PTA Coreg and ramipril when verified by pharmacy.  He has been intolerant to statins.  We will continue aspirin and will have sublingual nitroglycerin p.r.n.     5.  Diabetes mellitus.  We will hold off on the metformin while under observation status.  We will have him on sliding scale insulin.   6.  BPH.  Resume Flomax when verified.   7.  Deep venous thrombosis prophylaxis:  Encourage ambulation.  Anticipate a short stay.      CODE STATUS:  FULL CODE.         DONOVAN MACE MD             D: 2019   T: 2019   MT: HECTOR      Name:     CATRACHO MIKE   MRN:      [id number]        Account:      [id number]   :      1945        Admitted:     2019                   Document: [id number]       cc: Nomi Gil MD

## 2019-02-28 NOTE — DISCHARGE SUMMARY
[facility]    Discharge Summary  Hospitalist    Date of Admission:  2/27/2019  Date of Discharge:  2/28/2019  1:02 PM  Discharging Provider: JoAnna K. Barthell, PA-C    Discharge Diagnoses   Chest pressure, likely indigestion.  CAD s/p stenting RCA and CFx (2008).  Benign essential hypertension.  Dyslipidemia.  Statin intolerance.  Mild AS.  Type II diabetes.  BPH.  History of Present Illness   Travis Villa is an 73 year old male with above hx who presented with chest pressure after snow blowing and hypertension 204/103 at home. He was admitted under observation status for ACS rule out. EKG without acute ischemic changes, serial troponins undetectable, negative CXR, and NM Lexiscan showed normal myocardial perfusion. Patient reports after admission passing a surprising amount of gas with significant improvement in his symptoms - suspect indigestion as etiology.    CAD s/p RCA and CFx stenting (2008). Continues on PTA regimen.  - Admit for ACS rule out. Negative Lexiscan as above. Likely indigestion as significant improvement with flatulence.    Hypertension. SBPs elevated during adm in 150s range with holding BB for Lexiscan and improved later improved with administration. He is instructed to monitor BPs and bring to follow up for medication adjustment if indicated.    DLD. Statin intolerance. Continues on PTA regimen with reduced dose coenzyme Q-10 as was taking critically high dosage.    DM2. Continues on PTA regimen at discharge.    This patient was discussed with Dr. Seymour of the Hospitalist Service who agrees with current plans as outlined above.    JoAnna K. Barthell, PA-C    Significant Results and Procedures   NM Lexiscan.  CXR.  EKG.    Code Status   Full Code       Primary Care Physician   Nomi Gil    Physical Exam   Temp: 97.9  F (36.6  C) Temp src: Oral BP: 108/77 Pulse: 74 Heart Rate: 65 Resp: 16 SpO2: 93 % O2 Device: None (Room air) Oxygen Delivery: 2 LPM  Vitals:     02/27/19 1850 02/28/19 0615   Weight: 118.8 kg (262 lb) 119.3 kg (262 lb 14.4 oz)     Vital Signs with Ranges  Temp:  [97.9  F (36.6  C)-98.5  F (36.9  C)] 97.9  F (36.6  C)  Pulse:  [60-80] 74  Heart Rate:  [63-65] 65  Resp:  [15-25] 16  BP: (108-168)/(67-91) 108/77  SpO2:  [89 %-96 %] 93 %  I/O last 3 completed shifts:  In: -   Out: 950 [Urine:950]  Constitutional: Pleasant male who appears stated age. Looks comfortable semi-recumbent in bed.  Respiratory: No increased work of breathing.  Cardiovascular: No peripheral edema.  Skin: Warm, dry, no rashes or lesions.    Discharge Disposition   Discharged to home  Condition at discharge: Stable    Consultations This Hospital Stay   None    Time Spent on this Encounter   I, JoAnna K. Barthell, personally saw the patient today and spent less than or equal to 30 minutes discharging this patient.    Discharge Orders      Follow-Up with Cardiac Advanced Practice Provider      Reason for your hospital stay    Further evaluation of chest discomfort.     Activity    Your activity upon discharge: activity as tolerated     Monitor and record    blood pressure daily. Bring to follow up appointment.     Follow-up and recommended labs and tests     A liaison with the cardiology service should contact you in the next 48 business hours to arrange for hospital follow up and to assess medication adjustments for high blood pressure.  If you do not hear from them by Monday, 3/4/2019, call [phone] to schedule.    Please monitor and record your blood pressure daily. Bring to follow up appointment.     Full Code     Diet    Follow this diet upon discharge:    Moderate Consistent CHO Diet     Discharge Medications   Discharge Medication List as of 2/28/2019 12:36 PM      CONTINUE these medications which have CHANGED    Details   Coenzyme Q-10 60 MG CAPS Take 60 mg by mouth At Bedtime, Disp-30 capsule, R-0, E-PrescribeFuture refills by PCP Dr. Nomi Gil with phone number  [phone].         CONTINUE these medications which have NOT CHANGED    Details   amLODIPine (NORVASC) 2.5 MG tablet Take 2.5 mg by mouth 2 times daily, Historical      Ascorbic Acid (VITAMIN C PO) Take 1,000 mg by mouth daily , Historical      ASPIRIN PO Take 81 mg by mouth 2 times daily., Historical      calcium-magnesium (CALMAG) 500-250 MG TABS Take 1 tablet by mouth At Bedtime , Historical      carvedilol (COREG) 25 MG tablet Take 1 tablet (25 mg) by mouth 2 times daily (with meals), Disp-180 tablet, R-0, E-Prescribe      Cholecalciferol (VITAMIN D3 PO) Take 10,000 Units by mouth At Bedtime , Historical      Flaxseed, Linseed, OIL Take 1,300 mg by mouth daily , Historical      FOLIC ACID PO Take 400 mcg by mouth At Bedtime , Historical      garlic 150 MG TABS Take by mouth At Bedtime , Historical      GLUCOSAMINE SULFATE PO Take 1,500 mg by mouth daily , Historical      indapamide (LOZOL) 1.25 MG tablet Take 1 tablet (1.25 mg) by mouth every morning, Disp-90 tablet, R-0, E-Prescribe      METFORMIN HCL PO Take 500 mg by mouth 2 times daily (with meals) , Historical      multivitamin, therapeutic with minerals (MULTI-VITAMIN) TABS Take 1 tablet by mouth daily., Historical      Niacin, Antihyperlipidemic, (NIASPAN PO) Take 500 mg by mouth 2 times daily , Historical      nitroGLYcerin (NITROSTAT) 0.4 MG sublingual tablet Place 1 tablet (0.4 mg) under the tongue every 5 minutes as needed for chest pain, Disp-100 tablet, R-1, E-Prescribe      Omega-3 Fatty Acids (OMEGA-3 FISH OIL PO) Take 2 g by mouth 2 times daily (with meals) , Historical      ramipril (ALTACE) 10 MG capsule Take 1 capsule (10 mg) by mouth 2 times daily, Disp-180 capsule, R-0, E-Prescribe      Resveratrol 100 MG CAPS Take 100 mg by mouth At Bedtime , Historical      Saw Palmetto, Serenoa repens, (SAW PALMETTO CONCENTRATE OR) Take 500 mg by mouth At Bedtime , Historical           Allergies   Allergies   Allergen Reactions     Atorvastatin       Muscle aches     Data   Most Recent 3 CBC's:  Recent Labs   Lab Test 02/27/19 1907 12/29/12  1100   WBC 7.4 5.4   HGB 14.3 14.8   MCV 83 88    156      Most Recent 3 BMP's:  Recent Labs   Lab Test 02/27/19  1907 12/06/17 12/29/12  1100    137 134   POTASSIUM 4.0 3.9 3.5   CHLORIDE 96 98 92*   CO2 30 31 34*   BUN 14 12 18   CR 0.46* 0.75 0.60*   ANIONGAP 8 11.9 8   LEIGH ANN 8.9 8.8 9.6   * 139* 160*        Most Recent 3 Troponin's:  Recent Labs   Lab Test 02/28/19  0300 02/27/19  2337 02/27/19 1907   TROPI <0.015 <0.015 <0.015     Most Recent Cholesterol Panel:  Recent Labs   Lab Test 12/06/17   CHOL 190      HDL 32   TRIG 229     Results for orders placed or performed during the hospital encounter of 02/27/19   XR Chest 2 Views    Narrative    CHEST TWO VIEWS  2/27/2019 7:37 PM     HISTORY: Chest pressure    COMPARISON: 12/29/2012      Impression    IMPRESSION: No change. No active disease. There is some very minimal  chronic densities in the right lung base which are presumably  scarring.    DIAMANTE GUILLAUME MD   NM Lexiscan stress test (nuc card)    Narrative    GATED MYOCARDIAL PERFUSION SCINTIGRAPHY WITH INTRAVENOUS PHARMACOLOGIC  VASODILATATION LEXISCAN -ONE DAY STUDY     2/28/2019 10:48 AM  CATRACHO MIKE  73 years  Male  1945.    Indication/Clinical History: Chest pain    Impression  1.  Myocardial perfusion imaging using single isotope technique  demonstrated normal myocardial perfusion.   2. Gated images demonstrated normal wall motion.  The left ventricular  systolic function is normal.  3. Compared to the prior study from 2016, no significant changes are  noted .    Procedure  Pharmacologic stress testing was performed with Lexiscan at a rate of  0.08 mg/ml rapid bolus injection, for 15 seconds, 0.4 mg/5ml  intravenously. Low-level exercise is not performed along with the  vasodilator infusion.  The heart rate was 63 at baseline and pedro to  77 beats per minute during the  Lexiscan infusion. The rest blood  pressure was 159/90 mmHg and was 150/84 mm Hg during Lexiscan  infusion. The patient experienced no chest pain  during the test.    Myocardial perfusion imaging was performed at rest, approximately 45  minutes after the injection intravenously of 13 mCi of Tc-99m Myoview.  At peak pharmacologic effect, 10-20 seconds after Lexiscan,  the  patient was injected intravenously with 33.1 mCi of  Tc-99m Myoview.  The post-stress tomographic imaging was performed approximately 60  minutes after stress.    EKG Findings  The resting EKG demonstrated normal findings. The stress EKG  demonstrated no significant ST segment changes.    Tomographic Findings  Overall, the study quality is good . On the stress images, normal  myocardial perfusion was present. On the rest images, normal  myocardial perfusion was again seen . Gated images demonstrated normal  wall motion. The left ventricular ejection fraction was calculated to  be 77%. TID was absent.    SUZETTE OH MD

## 2019-02-28 NOTE — PROGRESS NOTES
Murmur, lungs clear.  [place] O2 sat 89-90%. Will put on oxygen before Lexiscan.  Explained Lexiscan and answered questions.

## 2019-02-28 NOTE — PROGRESS NOTES
RECEIVING UNIT ED HANDOFF REVIEW    ED Nurse Handoff Report was reviewed by: Antonia Davila on February 27, 2019 at 9:32 PM

## 2019-02-28 NOTE — PLAN OF CARE
Pt denied pain. Vitals stable. independent. Tele shows NSR w/BBB. Trops negative. No CP overnight. Good UOP. Plan for Kamini scan today. Continue to monitor.

## 2019-02-28 NOTE — PHARMACY-ADMISSION MEDICATION HISTORY
Admission medication history interview status for the 2/27/2019  admission is complete. See EPIC admission navigator for prior to admission medications     Medication history source reliability:Good    Actions taken by pharmacist (provider contacted, etc):None     Additional medication history information not noted on PTA med list :added amlodipine 2.5 mg bid    Medication reconciliation/reorder completed by provider prior to medication history? No    Time spent in this activity: 20 min    Prior to Admission medications    Medication Sig Last Dose Taking? Auth Provider   amLODIPine (NORVASC) 2.5 MG tablet Take 2.5 mg by mouth 2 times daily 2/27/2019 at x2 Yes Unknown, Entered By History   Ascorbic Acid (VITAMIN C PO) Take 1,000 mg by mouth daily  2/27/2019 at Unknown time Yes Unknown, Entered By History   ASPIRIN PO Take 81 mg by mouth 2 times daily. 2/27/2019 at x2 Yes Reported, Patient   calcium-magnesium (CALMAG) 500-250 MG TABS Take 1 tablet by mouth At Bedtime  2/26/2019 at Unknown time Yes Unknown, Entered By History   carvedilol (COREG) 25 MG tablet Take 1 tablet (25 mg) by mouth 2 times daily (with meals) 2/27/2019 at x2 Yes Ambrocio Valdez MD   Cholecalciferol (VITAMIN D3 PO) Take 10,000 Units by mouth At Bedtime  2/26/2019 at Unknown time Yes Unknown, Entered By History   COENZYME Q-10 PO Take 400 mg by mouth At Bedtime  2/26/2019 at Unknown time Yes Unknown, Entered By History   Flaxseed, Linseed, OIL Take 1,300 mg by mouth daily  2/26/2019 at Unknown time Yes Unknown, Entered By History   FOLIC ACID PO Take 400 mcg by mouth At Bedtime  2/26/2019 at Unknown time Yes Unknown, Entered By History   garlic 150 MG TABS Take by mouth At Bedtime  2/26/2019 at Unknown time Yes Unknown, Entered By History   GLUCOSAMINE SULFATE PO Take 1,500 mg by mouth daily  2/27/2019 at Unknown time Yes Unknown, Entered By History   indapamide (LOZOL) 1.25 MG tablet Take 1 tablet (1.25 mg) by mouth every morning 2/27/2019  at Unknown time Yes Ambrocio Valdez MD   METFORMIN HCL PO Take 500 mg by mouth 2 times daily (with meals)  2/27/2019 at x2 Yes Reported, Patient   multivitamin, therapeutic with minerals (MULTI-VITAMIN) TABS Take 1 tablet by mouth daily. 2/27/2019 at Unknown time Yes Unknown, Entered By History   Niacin, Antihyperlipidemic, (NIASPAN PO) Take 500 mg by mouth 2 times daily  2/27/2019 at am Yes Reported, Patient   nitroGLYcerin (NITROSTAT) 0.4 MG sublingual tablet Place 1 tablet (0.4 mg) under the tongue every 5 minutes as needed for chest pain  Yes Ambrocio Valdez MD   Omega-3 Fatty Acids (OMEGA-3 FISH OIL PO) Take 2 g by mouth 2 times daily (with meals)  2/27/2019 at am Yes Unknown, Entered By History   ramipril (ALTACE) 10 MG capsule Take 1 capsule (10 mg) by mouth 2 times daily 2/27/2019 at c2 Yes Ambrocio Valdez MD   Resveratrol 100 MG CAPS Take 100 mg by mouth At Bedtime  2/26/2019 at Unknown time Yes Unknown, Entered By History   Saw Palmetto, Serenoa repens, (SAW PALMETTO CONCENTRATE OR) Take 500 mg by mouth At Bedtime  2/26/2019 at Unknown time Yes Unknown, Entered By History

## 2019-03-02 LAB — INTERPRETATION ECG - MUSE: NORMAL

## 2019-03-25 DIAGNOSIS — I10 ESSENTIAL HYPERTENSION, BENIGN: ICD-10-CM

## 2019-03-25 RX ORDER — RAMIPRIL 10 MG/1
10 CAPSULE ORAL 2 TIMES DAILY
Qty: 180 CAPSULE | Refills: 1 | Status: SHIPPED | OUTPATIENT
Start: 2019-03-25 | End: 2019-05-20

## 2019-03-26 DIAGNOSIS — I25.10 CORONARY ARTERY DISEASE INVOLVING NATIVE HEART, ANGINA PRESENCE UNSPECIFIED, UNSPECIFIED VESSEL OR LESION TYPE: ICD-10-CM

## 2019-03-26 RX ORDER — NITROGLYCERIN 0.4 MG/1
0.4 TABLET SUBLINGUAL EVERY 5 MIN PRN
Qty: 100 TABLET | Refills: 1 | Status: SHIPPED | OUTPATIENT
Start: 2019-03-26 | End: 2019-05-20

## 2019-04-08 DIAGNOSIS — I25.10 CORONARY ARTERY DISEASE INVOLVING NATIVE HEART, ANGINA PRESENCE UNSPECIFIED, UNSPECIFIED VESSEL OR LESION TYPE: ICD-10-CM

## 2019-04-08 RX ORDER — INDAPAMIDE 1.25 MG/1
1.25 TABLET ORAL EVERY MORNING
Qty: 90 TABLET | Refills: 0 | Status: SHIPPED | OUTPATIENT
Start: 2019-04-08 | End: 2019-05-20

## 2019-04-11 DIAGNOSIS — I25.10 CORONARY ARTERY DISEASE INVOLVING NATIVE HEART, ANGINA PRESENCE UNSPECIFIED, UNSPECIFIED VESSEL OR LESION TYPE: ICD-10-CM

## 2019-04-11 RX ORDER — CARVEDILOL 25 MG/1
25 TABLET ORAL 2 TIMES DAILY WITH MEALS
Qty: 180 TABLET | Refills: 1 | Status: SHIPPED | OUTPATIENT
Start: 2019-04-11 | End: 2019-05-20

## 2019-05-20 ENCOUNTER — OFFICE VISIT (OUTPATIENT)
Dept: CARDIOLOGY | Facility: CLINIC | Age: 74
End: 2019-05-20
Attending: PHYSICIAN ASSISTANT
Payer: COMMERCIAL

## 2019-05-20 VITALS
OXYGEN SATURATION: 92 % | HEIGHT: 74 IN | SYSTOLIC BLOOD PRESSURE: 148 MMHG | HEART RATE: 64 BPM | DIASTOLIC BLOOD PRESSURE: 75 MMHG | WEIGHT: 264 LBS | BODY MASS INDEX: 33.88 KG/M2

## 2019-05-20 DIAGNOSIS — I25.10 CORONARY ARTERY DISEASE INVOLVING NATIVE CORONARY ARTERY OF NATIVE HEART WITHOUT ANGINA PECTORIS: ICD-10-CM

## 2019-05-20 DIAGNOSIS — I10 ESSENTIAL HYPERTENSION, BENIGN: Primary | ICD-10-CM

## 2019-05-20 DIAGNOSIS — I25.10 CORONARY ARTERY DISEASE INVOLVING NATIVE HEART, ANGINA PRESENCE UNSPECIFIED, UNSPECIFIED VESSEL OR LESION TYPE: ICD-10-CM

## 2019-05-20 PROCEDURE — 99214 OFFICE O/P EST MOD 30 MIN: CPT | Performed by: INTERNAL MEDICINE

## 2019-05-20 RX ORDER — RAMIPRIL 10 MG/1
10 CAPSULE ORAL 2 TIMES DAILY
Qty: 180 CAPSULE | Refills: 1 | Status: SHIPPED | OUTPATIENT
Start: 2019-05-20 | End: 2020-01-06

## 2019-05-20 RX ORDER — AMLODIPINE BESYLATE 2.5 MG/1
5 TABLET ORAL 2 TIMES DAILY
Qty: 60 TABLET | Refills: 11 | Status: SHIPPED | OUTPATIENT
Start: 2019-05-20 | End: 2019-11-11

## 2019-05-20 RX ORDER — INDAPAMIDE 1.25 MG/1
1.25 TABLET ORAL EVERY MORNING
Qty: 90 TABLET | Refills: 0 | Status: SHIPPED | OUTPATIENT
Start: 2019-05-20 | End: 2019-10-02

## 2019-05-20 RX ORDER — NITROGLYCERIN 0.4 MG/1
0.4 TABLET SUBLINGUAL EVERY 5 MIN PRN
Qty: 100 TABLET | Refills: 1 | Status: SHIPPED | OUTPATIENT
Start: 2019-05-20 | End: 2020-11-19

## 2019-05-20 RX ORDER — CARVEDILOL 25 MG/1
25 TABLET ORAL 2 TIMES DAILY WITH MEALS
Qty: 180 TABLET | Refills: 1 | Status: SHIPPED | OUTPATIENT
Start: 2019-05-20 | End: 2020-01-06

## 2019-05-20 ASSESSMENT — MIFFLIN-ST. JEOR: SCORE: 2002.25

## 2019-05-20 NOTE — PROGRESS NOTES
HISTORY OF PRESENT ILLNESS:  No chest pain. BP control diffcult.    Orders this Visit:  No orders of the defined types were placed in this encounter.    No orders of the defined types were placed in this encounter.    There are no discontinued medications.    Encounter Diagnoses   Name Primary?     Chest pressure      Coronary artery disease involving native coronary artery of native heart without angina pectoris        CURRENT MEDICATIONS:  Current Outpatient Medications   Medication Sig Dispense Refill     amLODIPine (NORVASC) 2.5 MG tablet Take 2.5 mg by mouth 2 times daily       Ascorbic Acid (VITAMIN C PO) Take 1,000 mg by mouth daily        ASPIRIN PO Take 81 mg by mouth 2 times daily.       calcium-magnesium (CALMAG) 500-250 MG TABS Take 1 tablet by mouth At Bedtime        carvedilol (COREG) 25 MG tablet Take 1 tablet (25 mg) by mouth 2 times daily (with meals) 180 tablet 1     Cholecalciferol (VITAMIN D3 PO) Take 10,000 Units by mouth At Bedtime        Coenzyme Q-10 60 MG CAPS Take 60 mg by mouth At Bedtime 30 capsule 0     Flaxseed, Linseed, OIL Take 1,300 mg by mouth daily        FOLIC ACID PO Take 400 mcg by mouth At Bedtime        garlic 150 MG TABS Take by mouth At Bedtime        GLUCOSAMINE SULFATE PO Take 1,500 mg by mouth daily        indapamide (LOZOL) 1.25 MG tablet Take 1 tablet (1.25 mg) by mouth every morning 90 tablet 0     METFORMIN HCL PO Take 500 mg by mouth 2 times daily (with meals)        multivitamin, therapeutic with minerals (MULTI-VITAMIN) TABS Take 1 tablet by mouth daily.       Niacin, Antihyperlipidemic, (NIASPAN PO) Take 500 mg by mouth 2 times daily        nitroGLYcerin (NITROSTAT) 0.4 MG sublingual tablet Place 1 tablet (0.4 mg) under the tongue every 5 minutes as needed for chest pain 100 tablet 1     Omega-3 Fatty Acids (OMEGA-3 FISH OIL PO) Take 2 g by mouth 2 times daily (with meals)        ramipril (ALTACE) 10 MG capsule Take 1 capsule (10 mg) by mouth 2 times daily 180  "capsule 1     Resveratrol 100 MG CAPS Take 100 mg by mouth At Bedtime        Saw Palmetto, Serenoa repens, (SAW PALMETTO CONCENTRATE OR) Take 500 mg by mouth At Bedtime          ALLERGIES     Allergies   Allergen Reactions     Atorvastatin      Muscle aches       PAST MEDICAL, SURGICAL, FAMILY, SOCIAL HISTORY:  History was reviewed and updated as needed, see medical record.    Review of Systems:  A 12-point review of systems was completed, see medical record for detailed review of systems information.    Physical Exam:  Vitals: /90 (BP Location: Right arm, Patient Position: Sitting, Cuff Size: Adult Large)   Pulse 64   Ht 1.88 m (6' 2\")   Wt 119.7 kg (264 lb)   SpO2 92%   BMI 33.90 kg/m      Constitutional:  cooperative, alert and oriented, well developed, well nourished, in no acute distress morbidly obese      Skin:  warm and dry to the touch, no apparent skin lesions or masses noted        Head:  normocephalic, no masses or lesions        Eyes:  pupils equal and round, conjunctivae and lids unremarkable, sclera white, no xanthalasma, EOMS intact, no nystagmus        ENT:  no pallor or cyanosis, dentition good        Neck:  carotid pulses are full and equal bilaterally, JVP normal, no carotid bruit        Chest:  normal breath sounds, clear to auscultation, normal A-P diameter, normal symmetry, normal respiratory excursion, no use of accessory muscles        Cardiac: regular rhythm, normal S1/S2, no S3 or S4, apical impulse not displaced, no murmurs, gallops or rubs                  Abdomen:  abdomen soft, non-tender, BS normoactive, no mass, no HSM, no bruits        Vascular: pulses full and equal, no bruits auscultated                                      Extremities and Back:  no deformities, clubbing, cyanosis, erythema observed        Neurological:  no gross motor deficits        ASSESSMENT:  I would prefer to increase amlodipine to 10 daily along with lifestyle intervention. Patient not yet " willing to increase this high, but wants to focus on lifestyle intervention and try 5 mg amlodipine.        RECOMMENDATIONS:   Patient will follow up bp with PMD and adjust to higher amlodipine dose if he fails to reach goal. If higher dose fails, might consider spironolactone.      Recent Lab Results:  LIPID RESULTS:  Lab Results   Component Value Date    CHOL 190 12/06/2017    HDL 32 12/06/2017     12/06/2017    TRIG 229 12/06/2017    CHOLHDLRATIO 3.9 03/13/2014       LIVER ENZYME RESULTS:  Lab Results   Component Value Date    AST 33 12/29/2012    ALT 6 03/13/2014       CBC RESULTS:  Lab Results   Component Value Date    WBC 7.4 02/27/2019    RBC 5.08 02/27/2019    HGB 14.3 02/27/2019    HCT 42.3 02/27/2019    MCV 83 02/27/2019    MCH 28.1 02/27/2019    MCHC 33.8 02/27/2019    RDW 14.3 02/27/2019     02/27/2019       BMP RESULTS:  Lab Results   Component Value Date     02/27/2019    POTASSIUM 4.0 02/27/2019    CHLORIDE 96 02/27/2019    CO2 30 02/27/2019    ANIONGAP 8 02/27/2019     (H) 02/27/2019    BUN 14 02/27/2019    CR 0.46 (L) 02/27/2019    GFRESTIMATED >90 02/27/2019    GFRESTBLACK >90 02/27/2019    LEIGH ANN 8.9 02/27/2019        A1C RESULTS:  No results found for: A1C    INR RESULTS:  Lab Results   Component Value Date    INR 1.03 05/02/2008    INR 1.04 03/13/2008       We greatly appreciate the opportunity to be involved in the care of your patient, Travis Villa.    Sincerely,  Michael Jon Manoles, MD CC Joanna Kersten Ulmen Barthell, PA-C  [address] [place] [address]

## 2019-05-20 NOTE — LETTER
5/20/2019    Nomi Gil MD  [facility] [address]  [facility] [address]    RE: Travis Villa       Dear Colleague,    I had the pleasure of seeing Travis Villa in the [facility].    HISTORY OF PRESENT ILLNESS:  No chest pain. BP control diffcult.    Orders this Visit:  No orders of the defined types were placed in this encounter.    No orders of the defined types were placed in this encounter.    There are no discontinued medications.    Encounter Diagnoses   Name Primary?     Chest pressure      Coronary artery disease involving native coronary artery of native heart without angina pectoris        CURRENT MEDICATIONS:  Current Outpatient Medications   Medication Sig Dispense Refill     amLODIPine (NORVASC) 2.5 MG tablet Take 2.5 mg by mouth 2 times daily       Ascorbic Acid (VITAMIN C PO) Take 1,000 mg by mouth daily        ASPIRIN PO Take 81 mg by mouth 2 times daily.       calcium-magnesium (CALMAG) 500-250 MG TABS Take 1 tablet by mouth At Bedtime        carvedilol (COREG) 25 MG tablet Take 1 tablet (25 mg) by mouth 2 times daily (with meals) 180 tablet 1     Cholecalciferol (VITAMIN D3 PO) Take 10,000 Units by mouth At Bedtime        Coenzyme Q-10 60 MG CAPS Take 60 mg by mouth At Bedtime 30 capsule 0     Flaxseed, Linseed, OIL Take 1,300 mg by mouth daily        FOLIC ACID PO Take 400 mcg by mouth At Bedtime        garlic 150 MG TABS Take by mouth At Bedtime        GLUCOSAMINE SULFATE PO Take 1,500 mg by mouth daily        indapamide (LOZOL) 1.25 MG tablet Take 1 tablet (1.25 mg) by mouth every morning 90 tablet 0     METFORMIN HCL PO Take 500 mg by mouth 2 times daily (with meals)        multivitamin, therapeutic with minerals (MULTI-VITAMIN) TABS Take 1 tablet by mouth daily.       Niacin, Antihyperlipidemic, (NIASPAN PO) Take 500 mg by mouth 2 times daily        nitroGLYcerin (NITROSTAT) 0.4 MG sublingual tablet Place 1 tablet  "(0.4 mg) under the tongue every 5 minutes as needed for chest pain 100 tablet 1     Omega-3 Fatty Acids (OMEGA-3 FISH OIL PO) Take 2 g by mouth 2 times daily (with meals)        ramipril (ALTACE) 10 MG capsule Take 1 capsule (10 mg) by mouth 2 times daily 180 capsule 1     Resveratrol 100 MG CAPS Take 100 mg by mouth At Bedtime        Saw Palmetto, Serenoa repens, (SAW PALMETTO CONCENTRATE OR) Take 500 mg by mouth At Bedtime          ALLERGIES     Allergies   Allergen Reactions     Atorvastatin      Muscle aches       PAST MEDICAL, SURGICAL, FAMILY, SOCIAL HISTORY:  History was reviewed and updated as needed, see medical record.    Review of Systems:  A 12-point review of systems was completed, see medical record for detailed review of systems information.    Physical Exam:  Vitals: /90 (BP Location: Right arm, Patient Position: Sitting, Cuff Size: Adult Large)   Pulse 64   Ht 1.88 m (6' 2\")   Wt 119.7 kg (264 lb)   SpO2 92%   BMI 33.90 kg/m       Constitutional:  cooperative, alert and oriented, well developed, well nourished, in no acute distress morbidly obese      Skin:  warm and dry to the touch, no apparent skin lesions or masses noted        Head:  normocephalic, no masses or lesions        Eyes:  pupils equal and round, conjunctivae and lids unremarkable, sclera white, no xanthalasma, EOMS intact, no nystagmus        ENT:  no pallor or cyanosis, dentition good        Neck:  carotid pulses are full and equal bilaterally, JVP normal, no carotid bruit        Chest:  normal breath sounds, clear to auscultation, normal A-P diameter, normal symmetry, normal respiratory excursion, no use of accessory muscles        Cardiac: regular rhythm, normal S1/S2, no S3 or S4, apical impulse not displaced, no murmurs, gallops or rubs                  Abdomen:  abdomen soft, non-tender, BS normoactive, no mass, no HSM, no bruits        Vascular: pulses full and equal, no bruits auscultated                            "           Extremities and Back:  no deformities, clubbing, cyanosis, erythema observed        Neurological:  no gross motor deficits        ASSESSMENT:  I would prefer to increase amlodipine to 10 daily along with lifestyle intervention. Patient not yet willing to increase this high, but wants to focus on lifestyle intervention and try 5 mg amlodipine.        RECOMMENDATIONS:   Patient will follow up bp with PMD and adjust to higher amlodipine dose if he fails to reach goal. If higher dose fails, might consider spironolactone.      Recent Lab Results:  LIPID RESULTS:  Lab Results   Component Value Date    CHOL 190 12/06/2017    HDL 32 12/06/2017     12/06/2017    TRIG 229 12/06/2017    CHOLHDLRATIO 3.9 03/13/2014       LIVER ENZYME RESULTS:  Lab Results   Component Value Date    AST 33 12/29/2012    ALT 6 03/13/2014       CBC RESULTS:  Lab Results   Component Value Date    WBC 7.4 02/27/2019    RBC 5.08 02/27/2019    HGB 14.3 02/27/2019    HCT 42.3 02/27/2019    MCV 83 02/27/2019    MCH 28.1 02/27/2019    MCHC 33.8 02/27/2019    RDW 14.3 02/27/2019     02/27/2019       BMP RESULTS:  Lab Results   Component Value Date     02/27/2019    POTASSIUM 4.0 02/27/2019    CHLORIDE 96 02/27/2019    CO2 30 02/27/2019    ANIONGAP 8 02/27/2019     (H) 02/27/2019    BUN 14 02/27/2019    CR 0.46 (L) 02/27/2019    GFRESTIMATED >90 02/27/2019    GFRESTBLACK >90 02/27/2019    LEIGH ANN 8.9 02/27/2019        A1C RESULTS:  No results found for: A1C    INR RESULTS:  Lab Results   Component Value Date    INR 1.03 05/02/2008    INR 1.04 03/13/2008       We greatly appreciate the opportunity to be involved in the care of your patient, Travis Villa.    Sincerely,  Michael Jon Manoles, MD CC Joanna Kersten Ulmen Barthell, PA-C  [address] [place] [address]                                                                       Thank you for allowing me to participate in the care of your  patient.      Sincerely,     Ambrocio Valdez MD     [facility]    cc:   Joanna Kersten Ulmen Barthell, DARIELA  [address] [place] [address]

## 2019-05-20 NOTE — LETTER
5/20/2019      Nomi Gil MD  [facility] [address]  [facility] [address]      RE: Travis Francoins       Dear Colleague,    I had the pleasure of seeing Travis Villa in the [facility].    Service Date: 05/20/2019      HISTORY OF PRESENT ILLNESS:  Travis Villa, a 73-year-old man with coronary artery disease, hypertension, type 2 diabetes mellitus, osteoarthritis, obesity, prostatic hypertrophy, and mild aortic stenosis was seen today at your request for followup.      When we last saw Mr. Villa on 09/27/2018, he was asymptomatic and his blood pressure was very well controlled.  We had even considered trying to taper his amlodipine.      In late 02/2019, the patient experienced chest pressure after snow shoveling and presented to the ER.  He was very hypertensive.  He was hospitalized, and myocardial infarction was ruled out.  A nuclear stress test was normal with a normal ejection fraction and no ischemia.  The patient's symptoms eventually resolved and his blood pressure better controlled. Ultimately his physicians felt the admitting symptoms had a  GI, not cardiac  etiology.  He remains free of chest, arm, neck, jaw or back discomfort with exertion.  A followup visit was scheduled for April 2019, but the patient declined seeing an advanced-level practitioner and instead wanted to see me in May 2019.      Mr. Villa indicates his blood pressure has been a little bit higher than average, but fairly well controlled at home.  His initial systolic lood pressure was 162 mmHg in our clinic but on repeat was 148 mmHg.  He admits he has not been taking amlodipine as regularly .      PAST MEDICAL HISTORY:   1.  Hypertension.   2.  Mild aortic stenosis -- last echo showing mean valve gradient of 15.   3.  Type 2 diabetes mellitus.   4.  Coronary artery disease.   a. Old history of stenting of right coronary artery and circumflex in 2008.   5.   Type 2 diabetes mellitus.   6. Dyslipidemia : intolerant of statins. Unwilling to consider PCSK9 inhibitor at this time.      PHYSICAL EXAMINATION:   GENERAL:  Exam today demonstrates a very pleasant, cooperative 73-year-old man.   VITAL SIGNS:  His blood pressure was 148/75, his heart rate is 64.  His height is 1.9 meters, his weight is 119.7 kg.  His BMI is 33.9.   LUNGS:  Clear to percussion and auscultation.   CARDIOVASCULAR:  Exam shows a normal S1 with a normal S2.  There is no S3.  There is no murmur, rub or click.      LABORATORY STUDIES:  The results of the patient's nuclear stress test and other lab tests were reviewed.  His last BMP in late 02/2019 showed a creatinine of 0.46 with a potassium of 4.1.  His hemoglobin is 14.3.  There has been no recent lipid profile.  As you may recall, he has been intolerant of statin drugs and ultimately has declined having statin drugs or a PCSK9 inhibitor.      Nuclear stress test 2019 showed a normal ejection fraction with no ischemia.      ASSESSMENT:  Mr. Villa is currently asymptomatic.  His blood pressure is suboptimally controlled.  In addition to lifestyle intervention, I have recommended he increase his amlodipine from 5 mg to 10 mg daily.  The patient is unwilling to increase amlodipine at this time, but will take 5 mg daily on a regular basis.  If he cannot bring the blood pressure under better control with exercise, diet and current antihypertensive regimen, he might consider increasing amlodipine further.  He prefers to follow up with the blood pressure with his primary care doctor and will check his blood pressure at home on a regular basis. .      If he ever changes his mind in regards to a PCSK9 inhibitor, we can discuss this at a future visit. We will not repeat TTE at this time, but would consider if his murmur changes or if he develops new symptoms.     RECOMMENDATIONS:   1.  Mediterranean-style weight loss diet.   2.  If the patient's blood pressure  remains suboptimally controlled on his current regimen of ramipril 20 mg daily, amlodipine 5 mg daily, indapamide 1.25 mg daily and carvedilol 25 b.i.d., I would increase amlodipine from 5 mg to 10 mg daily.  If despite lifestyle intervention and maximal doses of amlodipine the patient's systolic pressure remains greater than 130, I would recommend that you consider adding spironolactone to the regimen.   3.  Followup visit with me in about 1 year.  We can discuss PCSK9 inhibitors with the patient at another time at that visit.      We greatly appreciate the opportunity to see your patient, Mr. Catracho Mike.      cc:   Nomi Gil MD    [address], [address]    [place]  [address]         JAYDEN ALSTON MD             D: 2019   T: 2019   MT: ZO      Name:     CATRACHO MIKE   MRN:      [id number]        Account:      [id number]   :      1945           Service Date: 2019      Document: [id number]           Outpatient Encounter Medications as of 2019   Medication Sig Dispense Refill     amLODIPine (NORVASC) 2.5 MG tablet Take 2 tablets (5 mg) by mouth 2 times daily 60 tablet 11     Ascorbic Acid (VITAMIN C PO) Take 1,000 mg by mouth daily        ASPIRIN PO Take 81 mg by mouth 2 times daily.       calcium-magnesium (CALMAG) 500-250 MG TABS Take 1 tablet by mouth At Bedtime        carvedilol (COREG) 25 MG tablet Take 1 tablet (25 mg) by mouth 2 times daily (with meals) 180 tablet 1     Cholecalciferol (VITAMIN D3 PO) Take 10,000 Units by mouth At Bedtime        Coenzyme Q-10 60 MG CAPS Take 60 mg by mouth At Bedtime 30 capsule 0     Flaxseed, Linseed, OIL Take 1,300 mg by mouth daily        FOLIC ACID PO Take 400 mcg by mouth At Bedtime        garlic 150 MG TABS Take by mouth At Bedtime        GLUCOSAMINE SULFATE PO Take 1,500 mg by mouth daily        indapamide (LOZOL) 1.25 MG tablet Take 1 tablet (1.25 mg) by mouth every morning 90 tablet 0      METFORMIN HCL PO Take 500 mg by mouth 2 times daily (with meals)        multivitamin, therapeutic with minerals (MULTI-VITAMIN) TABS Take 1 tablet by mouth daily.       Niacin, Antihyperlipidemic, (NIASPAN PO) Take 500 mg by mouth 2 times daily        nitroGLYcerin (NITROSTAT) 0.4 MG sublingual tablet Place 1 tablet (0.4 mg) under the tongue every 5 minutes as needed for chest pain 100 tablet 1     Omega-3 Fatty Acids (OMEGA-3 FISH OIL PO) Take 2 g by mouth 2 times daily (with meals)        ramipril (ALTACE) 10 MG capsule Take 1 capsule (10 mg) by mouth 2 times daily 180 capsule 1     Resveratrol 100 MG CAPS Take 100 mg by mouth At Bedtime        Saw Palmetto, Serenoa repens, (SAW PALMETTO CONCENTRATE OR) Take 500 mg by mouth At Bedtime        [DISCONTINUED] amLODIPine (NORVASC) 2.5 MG tablet Take 5 mg by mouth 2 times daily       [DISCONTINUED] carvedilol (COREG) 25 MG tablet Take 1 tablet (25 mg) by mouth 2 times daily (with meals) 180 tablet 1     [DISCONTINUED] indapamide (LOZOL) 1.25 MG tablet Take 1 tablet (1.25 mg) by mouth every morning 90 tablet 0     [DISCONTINUED] nitroGLYcerin (NITROSTAT) 0.4 MG sublingual tablet Place 1 tablet (0.4 mg) under the tongue every 5 minutes as needed for chest pain 100 tablet 1     [DISCONTINUED] ramipril (ALTACE) 10 MG capsule Take 1 capsule (10 mg) by mouth 2 times daily 180 capsule 1     No facility-administered encounter medications on file as of 5/20/2019.        Again, thank you for allowing me to participate in the care of your patient.      Sincerely,    Ambrocio Valdez MD     [facility]

## 2019-05-20 NOTE — PROGRESS NOTES
Service Date: 05/20/2019      HISTORY OF PRESENT ILLNESS:  Travis Villa, a 73-year-old man with coronary artery disease, hypertension, type 2 diabetes mellitus, osteoarthritis, obesity, prostatic hypertrophy, and mild aortic stenosis was seen today at your request for followup.      When we last saw Mr. Villa on 09/27/2018, he was asymptomatic and his blood pressure was very well controlled.  We had even considered trying to taper his amlodipine.      In late 02/2019, the patient experienced chest pressure after snow shoveling and presented to the ER.  He was very hypertensive.  He was hospitalized, and myocardial infarction was ruled out.  A nuclear stress test was normal with a normal ejection fraction and no ischemia.  The patient's symptoms eventually resolved and his blood pressure better controlled. Ultimately his physicians felt the admitting symptoms had a  GI, not cardiac  etiology.  He remains free of chest, arm, neck, jaw or back discomfort with exertion.  A followup visit was scheduled for April 2019, but the patient declined seeing an advanced-level practitioner and instead wanted to see me in May 2019.      Mr. Villa indicates his blood pressure has been a little bit higher than average, but fairly well controlled at home.  His initial systolic lood pressure was 162 mmHg in our clinic but on repeat was 148 mmHg.  He admits he has not been taking amlodipine as regularly .      PAST MEDICAL HISTORY:   1.  Hypertension.   2.  Mild aortic stenosis -- last echo showing mean valve gradient of 15.   3.  Type 2 diabetes mellitus.   4.  Coronary artery disease.   a. Old history of stenting of right coronary artery and circumflex in 2008.   5.  Type 2 diabetes mellitus.   6. Dyslipidemia : intolerant of statins. Unwilling to consider PCSK9 inhibitor at this time.      PHYSICAL EXAMINATION:   GENERAL:  Exam today demonstrates a very pleasant, cooperative 73-year-old man.   VITAL SIGNS:  His blood pressure  was 148/75, his heart rate is 64.  His height is 1.9 meters, his weight is 119.7 kg.  His BMI is 33.9.   LUNGS:  Clear to percussion and auscultation.   CARDIOVASCULAR:  Exam shows a normal S1 with a normal S2.  There is no S3.  There is no murmur, rub or click.      LABORATORY STUDIES:  The results of the patient's nuclear stress test and other lab tests were reviewed.  His last BMP in late 02/2019 showed a creatinine of 0.46 with a potassium of 4.1.  His hemoglobin is 14.3.  There has been no recent lipid profile.  As you may recall, he has been intolerant of statin drugs and ultimately has declined having statin drugs or a PCSK9 inhibitor.      Nuclear stress test 2019 showed a normal ejection fraction with no ischemia.      ASSESSMENT:  Mr. Villa is currently asymptomatic.  His blood pressure is suboptimally controlled.  In addition to lifestyle intervention, I have recommended he increase his amlodipine from 5 mg to 10 mg daily.  The patient is unwilling to increase amlodipine at this time, but will take 5 mg daily on a regular basis.  If he cannot bring the blood pressure under better control with exercise, diet and current antihypertensive regimen, he might consider increasing amlodipine further.  He prefers to follow up with the blood pressure with his primary care doctor and will check his blood pressure at home on a regular basis. .      If he ever changes his mind in regards to a PCSK9 inhibitor, we can discuss this at a future visit. We will not repeat TTE at this time, but would consider if his murmur changes or if he develops new symptoms.     RECOMMENDATIONS:   1.  Mediterranean-style weight loss diet.   2.  If the patient's blood pressure remains suboptimally controlled on his current regimen of ramipril 20 mg daily, amlodipine 5 mg daily, indapamide 1.25 mg daily and carvedilol 25 b.i.d., I would increase amlodipine from 5 mg to 10 mg daily.  If despite lifestyle intervention and maximal doses of  amlodipine the patient's systolic pressure remains greater than 130, I would recommend that you consider adding spironolactone to the regimen.   3.  Followup visit with me in about 1 year.  We can discuss PCSK9 inhibitors with the patient at another time at that visit.      We greatly appreciate the opportunity to see your patient, Mr. Catracho Mike.      cc:   Nomi Gil MD    [address], [address] [place] [address]         JAYDEN ALSTON MD             D: 2019   T: 2019   MT: ZO      Name:     CATRACHO MIKE   MRN:      [id number]        Account:      [id number]   :      1945           Service Date: 2019      Document: [id number]

## 2019-10-02 DIAGNOSIS — I25.10 CORONARY ARTERY DISEASE INVOLVING NATIVE HEART, ANGINA PRESENCE UNSPECIFIED, UNSPECIFIED VESSEL OR LESION TYPE: ICD-10-CM

## 2019-10-02 RX ORDER — INDAPAMIDE 1.25 MG/1
1.25 TABLET ORAL EVERY MORNING
Qty: 90 TABLET | Refills: 2 | Status: SHIPPED | OUTPATIENT
Start: 2019-10-02 | End: 2020-07-09

## 2019-11-11 DIAGNOSIS — I25.10 CORONARY ARTERY DISEASE INVOLVING NATIVE CORONARY ARTERY OF NATIVE HEART WITHOUT ANGINA PECTORIS: ICD-10-CM

## 2019-11-11 DIAGNOSIS — I25.10 CORONARY ARTERY DISEASE INVOLVING NATIVE HEART, ANGINA PRESENCE UNSPECIFIED, UNSPECIFIED VESSEL OR LESION TYPE: ICD-10-CM

## 2019-11-11 DIAGNOSIS — I10 ESSENTIAL HYPERTENSION, BENIGN: ICD-10-CM

## 2019-11-11 RX ORDER — AMLODIPINE BESYLATE 2.5 MG/1
5 TABLET ORAL 2 TIMES DAILY
Qty: 180 TABLET | Refills: 2 | Status: SHIPPED | OUTPATIENT
Start: 2019-11-11 | End: 2019-11-26

## 2019-11-26 DIAGNOSIS — I25.10 CORONARY ARTERY DISEASE INVOLVING NATIVE HEART, ANGINA PRESENCE UNSPECIFIED, UNSPECIFIED VESSEL OR LESION TYPE: ICD-10-CM

## 2019-11-26 DIAGNOSIS — I25.10 CORONARY ARTERY DISEASE INVOLVING NATIVE CORONARY ARTERY OF NATIVE HEART WITHOUT ANGINA PECTORIS: ICD-10-CM

## 2019-11-26 DIAGNOSIS — I10 ESSENTIAL HYPERTENSION, BENIGN: ICD-10-CM

## 2019-11-26 RX ORDER — AMLODIPINE BESYLATE 2.5 MG/1
5 TABLET ORAL 2 TIMES DAILY
Qty: 360 TABLET | Refills: 1 | Status: SHIPPED | OUTPATIENT
Start: 2019-11-26 | End: 2020-07-13

## 2020-01-06 DIAGNOSIS — I10 ESSENTIAL HYPERTENSION, BENIGN: ICD-10-CM

## 2020-01-06 DIAGNOSIS — I25.10 CORONARY ARTERY DISEASE INVOLVING NATIVE HEART, ANGINA PRESENCE UNSPECIFIED, UNSPECIFIED VESSEL OR LESION TYPE: ICD-10-CM

## 2020-01-06 RX ORDER — RAMIPRIL 10 MG/1
10 CAPSULE ORAL 2 TIMES DAILY
Qty: 180 CAPSULE | Refills: 1 | Status: SHIPPED | OUTPATIENT
Start: 2020-01-06 | End: 2020-07-09

## 2020-01-06 RX ORDER — CARVEDILOL 25 MG/1
25 TABLET ORAL 2 TIMES DAILY WITH MEALS
Qty: 180 TABLET | Refills: 1 | Status: SHIPPED | OUTPATIENT
Start: 2020-01-06 | End: 2020-07-09

## 2020-07-09 DIAGNOSIS — I10 ESSENTIAL HYPERTENSION, BENIGN: ICD-10-CM

## 2020-07-09 DIAGNOSIS — I25.10 CORONARY ARTERY DISEASE INVOLVING NATIVE HEART, ANGINA PRESENCE UNSPECIFIED, UNSPECIFIED VESSEL OR LESION TYPE: ICD-10-CM

## 2020-07-09 RX ORDER — CARVEDILOL 25 MG/1
25 TABLET ORAL 2 TIMES DAILY WITH MEALS
Qty: 180 TABLET | Refills: 0 | Status: SHIPPED | OUTPATIENT
Start: 2020-07-09 | End: 2020-10-02

## 2020-07-09 RX ORDER — RAMIPRIL 10 MG/1
10 CAPSULE ORAL 2 TIMES DAILY
Qty: 180 CAPSULE | Refills: 0 | Status: SHIPPED | OUTPATIENT
Start: 2020-07-09 | End: 2020-10-02

## 2020-07-09 RX ORDER — INDAPAMIDE 1.25 MG/1
1.25 TABLET ORAL EVERY MORNING
Qty: 90 TABLET | Refills: 0 | Status: SHIPPED | OUTPATIENT
Start: 2020-07-09 | End: 2020-10-02

## 2020-07-13 DIAGNOSIS — I25.10 CORONARY ARTERY DISEASE INVOLVING NATIVE HEART, ANGINA PRESENCE UNSPECIFIED, UNSPECIFIED VESSEL OR LESION TYPE: ICD-10-CM

## 2020-07-13 DIAGNOSIS — I25.10 CORONARY ARTERY DISEASE INVOLVING NATIVE CORONARY ARTERY OF NATIVE HEART WITHOUT ANGINA PECTORIS: ICD-10-CM

## 2020-07-13 DIAGNOSIS — I10 ESSENTIAL HYPERTENSION, BENIGN: ICD-10-CM

## 2020-07-13 RX ORDER — AMLODIPINE BESYLATE 2.5 MG/1
5 TABLET ORAL 2 TIMES DAILY
Qty: 360 TABLET | Refills: 0 | Status: SHIPPED | OUTPATIENT
Start: 2020-07-13 | End: 2020-10-12

## 2020-08-17 ENCOUNTER — OFFICE VISIT (OUTPATIENT)
Dept: CARDIOLOGY | Facility: CLINIC | Age: 75
End: 2020-08-17
Payer: COMMERCIAL

## 2020-08-17 VITALS
WEIGHT: 275 LBS | HEART RATE: 65 BPM | HEIGHT: 74 IN | BODY MASS INDEX: 35.29 KG/M2 | SYSTOLIC BLOOD PRESSURE: 117 MMHG | DIASTOLIC BLOOD PRESSURE: 72 MMHG

## 2020-08-17 DIAGNOSIS — I35.0 NONRHEUMATIC AORTIC VALVE STENOSIS: ICD-10-CM

## 2020-08-17 DIAGNOSIS — I10 BENIGN ESSENTIAL HYPERTENSION: Primary | ICD-10-CM

## 2020-08-17 DIAGNOSIS — I25.10 CORONARY ARTERY DISEASE INVOLVING NATIVE CORONARY ARTERY OF NATIVE HEART WITHOUT ANGINA PECTORIS: ICD-10-CM

## 2020-08-17 PROCEDURE — 99214 OFFICE O/P EST MOD 30 MIN: CPT | Performed by: INTERNAL MEDICINE

## 2020-08-17 ASSESSMENT — MIFFLIN-ST. JEOR: SCORE: 2042.14

## 2020-08-17 NOTE — PROGRESS NOTES
Service Date: 08/17/2020      CARDIOLOGY CLINIC VISIT NOTE      PRIMARY CARE DOCTOR:  Dr. Nomi Gil       HISTORY OF PRESENT ILLNESS:  Travis Villa, a 75-year-old man with coronary artery disease, aortic stenosis, hypertension, type 2 diabetes mellitus, osteoarthritis, obesity, and prostatic hypertrophy, was seen today at your request for followup. We last saw Mr. Villa in 2018.     Over the last 2 months he has developed fatigue with exertion.  The patient describes a sense of profound fatigue after climbing about 2 flights of steps.  There has been no typical chest, arm, neck, jaw or back discomfort with exertion.  He denies syncope, orthopnea, PND or diaphoresis.      The patient admits he has gained about 11 pounds since we last saw him and wonders whether continued weight gain is contributing to his symptoms.      PAST MEDICAL HISTORY:   1.  Hypertension, currently well controlled.   2.  Aortic stenosis most recent TTE had demonstrated MSG= 18 mmHg  3.  Type 2 diabetes mellitus.   4.  Coronary artery disease.   a.  Old history of right coronary and circumflex stenting 2008.   5.  Dyslipidemia, intolerant of statins.   6.  Osteoarthritis involving hips.      PHYSICAL EXAMINATION:   GENERAL:  Today, demonstrates a very pleasant, cooperative and intelligent 75-year-old man who is overweight.   VITAL SIGNS:  His blood pressure is 117/72, his heart rate 65.  His height is 1.9 meters, his weight is 124.7 kg for a BMI of 35.31.  His weight is up about 11 pounds since seen in 05/2019.   LUNGS:  Clear to percussion and auscultation.   CARDIOVASCULAR:  Shows a normal S1 with a grade 2/6 to 3/6 systolic ejection murmur heard at the aortic region radiating to  the carotids.  His pulses are full symmetrical with no pulsus parvus et tardus.   ABDOMINAL EXAMINATION:  Shows marked obesity.  I cannot palpate any internal organs.   NEUROLOGIC:  Cranial nerves II-XII are intact.  His strength equal and symmetrical.  He  displays normal insight and judgment.      LABORATORY STUDIES:  There have been no recent labs since 2019.  His last creatinine was 0.46.      MEDICATIONS:   1.  Amlodipine 2.5 b.i.d.   2.  Carvedilol 25 b.i.d.   3.  Indapamide 1.25 mg daily.   4.  Metformin 500 mg b.i.d.   5.  Nitroglycerin sublingual p.r.n.   6.  Ramipril 10 mg b.i.d.       ASSESSMENT:  The patient's exertional dyspnea may be on the basis of obesity and deconditioning but he has known coronary disease and his aortic  murmur is more prominent since last seen.  He needs formal assessment for both aortic stenosis and for coronary disease.  I would recommend that we begin with a stress echocardiogram.  The patient would benefit from weight loss and a Mediterranean-style diet. An exercise program will be advised if the stress test is not worrisome for severe aortic stenosis and/or CAD.     RECOMMENDATIONS:   1.  Mediterranean-style weight loss diet.   2.  Stress echocardiogram to assess for ischemia and to assess the degree of aortic stenosis.  We will plan for a formal 2D echo study to assess the aortic valve area at the time of the stress test.   3.  Follow up with me in about 1 month to assess symptoms and discuss results of testing.      We have appreciated the opportunity to be involved in the care for your patient, Mr. Catracho Mike.      Ambrocio Valdez MD       cc:   [place] [address]         AMBROCIO VALDEZ MD             D: 2020   T: 2020   MT: DANY      Name:     CATRACHO MIKE   MRN:      [id number]        Account:      [id number]   :      1945           Service Date: 2020      Document: [id number]

## 2020-08-17 NOTE — LETTER
"8/17/2020    Nomi Gil MD  [facility] [address] [facility] [address]  [facility] [address]    RE: Travis Villa       Dear Colleague,    I had the pleasure of seeing Travis Villa in the [facility].    HISTORY OF PRESENT ILLNESS:  Upon climbing steps over last two months is \"exhuasted\"   No chest discomfort. No dyspnea. Wonders if weight gain is contributing has gained 11 lbs according to records. Hip arthritis limits his physical exercise, but has a stationary bike.     Orders this Visit:  No orders of the defined types were placed in this encounter.    No orders of the defined types were placed in this encounter.    There are no discontinued medications.    No diagnosis found.    CURRENT MEDICATIONS:  Current Outpatient Medications   Medication Sig Dispense Refill     amLODIPine (NORVASC) 2.5 MG tablet Take 2 tablets (5 mg) by mouth 2 times daily 360 tablet 0     Ascorbic Acid (VITAMIN C PO) Take 1,000 mg by mouth daily        ASPIRIN PO Take 81 mg by mouth 2 times daily.       calcium-magnesium (CALMAG) 500-250 MG TABS Take 1 tablet by mouth At Bedtime        carvedilol (COREG) 25 MG tablet Take 1 tablet (25 mg) by mouth 2 times daily (with meals) 180 tablet 0     Cholecalciferol (VITAMIN D3 PO) Take 10,000 Units by mouth At Bedtime        Coenzyme Q-10 60 MG CAPS Take 60 mg by mouth At Bedtime 30 capsule 0     Flaxseed, Linseed, OIL Take 1,300 mg by mouth daily        FOLIC ACID PO Take 400 mcg by mouth At Bedtime        garlic 150 MG TABS Take by mouth At Bedtime        GLUCOSAMINE SULFATE PO Take 1,500 mg by mouth daily        indapamide (LOZOL) 1.25 MG tablet Take 1 tablet (1.25 mg) by mouth every morning 90 tablet 0     METFORMIN HCL PO Take 500 mg by mouth 2 times daily (with meals)        multivitamin, therapeutic with minerals (MULTI-VITAMIN) TABS Take 1 tablet by mouth daily.       Niacin, Antihyperlipidemic, (NIASPAN PO) Take 500 mg by mouth 2 " "times daily        nitroGLYcerin (NITROSTAT) 0.4 MG sublingual tablet Place 1 tablet (0.4 mg) under the tongue every 5 minutes as needed for chest pain 100 tablet 1     Omega-3 Fatty Acids (OMEGA-3 FISH OIL PO) Take 2 g by mouth 2 times daily (with meals)        ramipril (ALTACE) 10 MG capsule Take 1 capsule (10 mg) by mouth 2 times daily 180 capsule 0     Resveratrol 100 MG CAPS Take 100 mg by mouth At Bedtime        Saw Palmetto, Serenoa repens, (SAW PALMETTO CONCENTRATE OR) Take 500 mg by mouth At Bedtime          ALLERGIES     Allergies   Allergen Reactions     Atorvastatin      Muscle aches       PAST MEDICAL, SURGICAL, FAMILY, SOCIAL HISTORY:  History was reviewed and updated as needed, see medical record.    Review of Systems:  A 12-point review of systems was completed, see medical record for detailed review of systems information.    Physical Exam:  Vitals: /72   Pulse 65   Ht 1.88 m (6' 2\")   Wt 124.7 kg (275 lb)   BMI 35.31 kg/m      Constitutional:           Skin:           Head:           Eyes:           ENT:           Neck:           Chest:           Cardiac:                    Abdomen:           Vascular:                                        Extremities and Back:           Neurological:           ASSESSMENT: Should formally assess his exercise and aortic valve with a stress echocardiogram.      RECOMMENDATIONS:  Stress echocardiogram.   Iipid profile  Needs a full TTE to look at aortic valve as well as stress echocardiogram      Recent Lab Results:  LIPID RESULTS:  Lab Results   Component Value Date    CHOL 190 12/06/2017    HDL 32 12/06/2017     12/06/2017    TRIG 229 12/06/2017    CHOLHDLRATIO 3.9 03/13/2014       LIVER ENZYME RESULTS:  Lab Results   Component Value Date    AST 33 12/29/2012    ALT 6 03/13/2014       CBC RESULTS:  Lab Results   Component Value Date    WBC 7.4 02/27/2019    RBC 5.08 02/27/2019    HGB 14.3 02/27/2019    HCT 42.3 02/27/2019    MCV 83 02/27/2019    " MCH 28.1 02/27/2019    MCHC 33.8 02/27/2019    RDW 14.3 02/27/2019     02/27/2019       BMP RESULTS:  Lab Results   Component Value Date     02/27/2019    POTASSIUM 4.0 02/27/2019    CHLORIDE 96 02/27/2019    CO2 30 02/27/2019    ANIONGAP 8 02/27/2019     (H) 02/27/2019    BUN 14 02/27/2019    CR 0.46 (L) 02/27/2019    GFRESTIMATED >90 02/27/2019    GFRESTBLACK >90 02/27/2019    LEIGH ANN 8.9 02/27/2019        A1C RESULTS:  No results found for: A1C    INR RESULTS:  Lab Results   Component Value Date    INR 1.03 05/02/2008    INR 1.04 03/13/2008       We greatly appreciate the opportunity to be involved in the care of your patient, Travis Villa.    Sincerely,  Ambrocio Valdez MD      CC  No referring provider defined for this encounter.                                                                       Thank you for allowing me to participate in the care of your patient.      Sincerely,     Ambrocio Valdez MD     [facility]    cc:   No referring provider defined for this encounter.

## 2020-08-17 NOTE — LETTER
8/17/2020      Nomi Gil MD  [facility] [address]  [facility] [address]      RE: Travis Villa       Dear Colleague,    I had the pleasure of seeing Travis Villa in the [facility].    Service Date: 08/17/2020      CARDIOLOGY CLINIC VISIT NOTE      PRIMARY CARE DOCTOR:  Dr. Nomi Gil       HISTORY OF PRESENT ILLNESS:  Travis Villa, a 75-year-old man with coronary artery disease, aortic stenosis, hypertension, type 2 diabetes mellitus, osteoarthritis, obesity, and prostatic hypertrophy, was seen today at your request for followup. We last saw Mr. Villa in 2018.     Over the last 2 months he has developed fatigue with exertion.  The patient describes a sense of profound fatigue after climbing about 2 flights of steps.  There has been no typical chest, arm, neck, jaw or back discomfort with exertion.  He denies syncope, orthopnea, PND or diaphoresis.      The patient admits he has gained about 11 pounds since we last saw him and wonders whether continued weight gain is contributing to his symptoms.      PAST MEDICAL HISTORY:   1.  Hypertension, currently well controlled.   2.  Aortic stenosis most recent TTE had demonstrated MSG= 18 mmHg  3.  Type 2 diabetes mellitus.   4.  Coronary artery disease.   a.  Old history of right coronary and circumflex stenting 2008.   5.  Dyslipidemia, intolerant of statins.   6.  Osteoarthritis involving hips.      PHYSICAL EXAMINATION:   GENERAL:  Today, demonstrates a very pleasant, cooperative and intelligent 75-year-old man who is overweight.   VITAL SIGNS:  His blood pressure is 117/72, his heart rate 65.  His height is 1.9 meters, his weight is 124.7 kg for a BMI of 35.31.  His weight is up about 11 pounds since seen in 05/2019.   LUNGS:  Clear to percussion and auscultation.   CARDIOVASCULAR:  Shows a normal S1 with a grade 2/6 to 3/6 systolic ejection murmur heard at the aortic region  radiating to  the carotids.  His pulses are full symmetrical with no pulsus parvus et tardus.   ABDOMINAL EXAMINATION:  Shows marked obesity.  I cannot palpate any internal organs.   NEUROLOGIC:  Cranial nerves II-XII are intact.  His strength equal and symmetrical.  He displays normal insight and judgment.      LABORATORY STUDIES:  There have been no recent labs since 2019.  His last creatinine was 0.46.      MEDICATIONS:   1.  Amlodipine 2.5 b.i.d.   2.  Carvedilol 25 b.i.d.   3.  Indapamide 1.25 mg daily.   4.  Metformin 500 mg b.i.d.   5.  Nitroglycerin sublingual p.r.n.   6.  Ramipril 10 mg b.i.d.       ASSESSMENT:  The patient's exertional dyspnea may be on the basis of obesity and deconditioning but he has known coronary disease and his aortic  murmur is more prominent since last seen.  He needs formal assessment for both aortic stenosis and for coronary disease.  I would recommend that we begin with a stress echocardiogram.  The patient would benefit from weight loss and a Mediterranean-style diet. An exercise program will be advised if the stress test is not worrisome for severe aortic stenosis and/or CAD.     RECOMMENDATIONS:   1.  Mediterranean-style weight loss diet.   2.  Stress echocardiogram to assess for ischemia and to assess the degree of aortic stenosis.  We will plan for a formal 2D echo study to assess the aortic valve area at the time of the stress test.   3.  Follow up with me in about 1 month to assess symptoms and discuss results of testing.      We have appreciated the opportunity to be involved in the care for your patient, Mr. Catracho Mike.      Ambrocio Valdez MD       cc:   [place] [address]         AMBROCIO VALDEZ MD             D: 2020   T: 2020   MT: DANY      Name:     CATRACHO MIKE   MRN:      [id number]        Account:      [id number]   :      1945           Service Date:  08/17/2020      Document: [id number]           Outpatient Encounter Medications as of 8/17/2020   Medication Sig Dispense Refill     amLODIPine (NORVASC) 2.5 MG tablet Take 2 tablets (5 mg) by mouth 2 times daily 360 tablet 0     Ascorbic Acid (VITAMIN C PO) Take 1,000 mg by mouth daily        ASPIRIN PO Take 81 mg by mouth 2 times daily.       calcium-magnesium (CALMAG) 500-250 MG TABS Take 1 tablet by mouth At Bedtime        carvedilol (COREG) 25 MG tablet Take 1 tablet (25 mg) by mouth 2 times daily (with meals) 180 tablet 0     Cholecalciferol (VITAMIN D3 PO) Take 10,000 Units by mouth At Bedtime        Coenzyme Q-10 60 MG CAPS Take 60 mg by mouth At Bedtime 30 capsule 0     Flaxseed, Linseed, OIL Take 1,300 mg by mouth daily        FOLIC ACID PO Take 400 mcg by mouth At Bedtime        garlic 150 MG TABS Take by mouth At Bedtime        GLUCOSAMINE SULFATE PO Take 1,500 mg by mouth daily        indapamide (LOZOL) 1.25 MG tablet Take 1 tablet (1.25 mg) by mouth every morning 90 tablet 0     METFORMIN HCL PO Take 500 mg by mouth 2 times daily (with meals)        multivitamin, therapeutic with minerals (MULTI-VITAMIN) TABS Take 1 tablet by mouth daily.       Niacin, Antihyperlipidemic, (NIASPAN PO) Take 500 mg by mouth 2 times daily        nitroGLYcerin (NITROSTAT) 0.4 MG sublingual tablet Place 1 tablet (0.4 mg) under the tongue every 5 minutes as needed for chest pain 100 tablet 1     Omega-3 Fatty Acids (OMEGA-3 FISH OIL PO) Take 2 g by mouth 2 times daily (with meals)        ramipril (ALTACE) 10 MG capsule Take 1 capsule (10 mg) by mouth 2 times daily 180 capsule 0     Resveratrol 100 MG CAPS Take 100 mg by mouth At Bedtime        Saw Palmetto, Serenoa repens, (SAW PALMETTO CONCENTRATE OR) Take 500 mg by mouth At Bedtime        No facility-administered encounter medications on file as of 8/17/2020.        Again, thank you for allowing me to participate in the care of your patient.      Sincerely,    Ambrocio Nugent  MD José Miguel     [facility]

## 2020-08-17 NOTE — PROGRESS NOTES
"HISTORY OF PRESENT ILLNESS:  Upon climbing steps over last two months is \"exhuasted\"   No chest discomfort. No dyspnea. Wonders if weight gain is contributing has gained 11 lbs according to records. Hip arthritis limits his physical exercise, but has a stationary bike.     Orders this Visit:  No orders of the defined types were placed in this encounter.    No orders of the defined types were placed in this encounter.    There are no discontinued medications.    No diagnosis found.    CURRENT MEDICATIONS:  Current Outpatient Medications   Medication Sig Dispense Refill     amLODIPine (NORVASC) 2.5 MG tablet Take 2 tablets (5 mg) by mouth 2 times daily 360 tablet 0     Ascorbic Acid (VITAMIN C PO) Take 1,000 mg by mouth daily        ASPIRIN PO Take 81 mg by mouth 2 times daily.       calcium-magnesium (CALMAG) 500-250 MG TABS Take 1 tablet by mouth At Bedtime        carvedilol (COREG) 25 MG tablet Take 1 tablet (25 mg) by mouth 2 times daily (with meals) 180 tablet 0     Cholecalciferol (VITAMIN D3 PO) Take 10,000 Units by mouth At Bedtime        Coenzyme Q-10 60 MG CAPS Take 60 mg by mouth At Bedtime 30 capsule 0     Flaxseed, Linseed, OIL Take 1,300 mg by mouth daily        FOLIC ACID PO Take 400 mcg by mouth At Bedtime        garlic 150 MG TABS Take by mouth At Bedtime        GLUCOSAMINE SULFATE PO Take 1,500 mg by mouth daily        indapamide (LOZOL) 1.25 MG tablet Take 1 tablet (1.25 mg) by mouth every morning 90 tablet 0     METFORMIN HCL PO Take 500 mg by mouth 2 times daily (with meals)        multivitamin, therapeutic with minerals (MULTI-VITAMIN) TABS Take 1 tablet by mouth daily.       Niacin, Antihyperlipidemic, (NIASPAN PO) Take 500 mg by mouth 2 times daily        nitroGLYcerin (NITROSTAT) 0.4 MG sublingual tablet Place 1 tablet (0.4 mg) under the tongue every 5 minutes as needed for chest pain 100 tablet 1     Omega-3 Fatty Acids (OMEGA-3 FISH OIL PO) Take 2 g by mouth 2 times daily (with meals)        " "ramipril (ALTACE) 10 MG capsule Take 1 capsule (10 mg) by mouth 2 times daily 180 capsule 0     Resveratrol 100 MG CAPS Take 100 mg by mouth At Bedtime        Saw Palmetto, Serenoa repens, (SAW PALMETTO CONCENTRATE OR) Take 500 mg by mouth At Bedtime          ALLERGIES     Allergies   Allergen Reactions     Atorvastatin      Muscle aches       PAST MEDICAL, SURGICAL, FAMILY, SOCIAL HISTORY:  History was reviewed and updated as needed, see medical record.    Review of Systems:  A 12-point review of systems was completed, see medical record for detailed review of systems information.    Physical Exam:  Vitals: /72   Pulse 65   Ht 1.88 m (6' 2\")   Wt 124.7 kg (275 lb)   BMI 35.31 kg/m      Constitutional:           Skin:           Head:           Eyes:           ENT:           Neck:           Chest:           Cardiac:                    Abdomen:           Vascular:                                        Extremities and Back:           Neurological:           ASSESSMENT: Should formally assess his exercise and aortic valve with a stress echocardiogram.      RECOMMENDATIONS:  Stress echocardiogram.   Iipid profile  Needs a full TTE to look at aortic valve as well as stress echocardiogram      Recent Lab Results:  LIPID RESULTS:  Lab Results   Component Value Date    CHOL 190 12/06/2017    HDL 32 12/06/2017     12/06/2017    TRIG 229 12/06/2017    CHOLHDLRATIO 3.9 03/13/2014       LIVER ENZYME RESULTS:  Lab Results   Component Value Date    AST 33 12/29/2012    ALT 6 03/13/2014       CBC RESULTS:  Lab Results   Component Value Date    WBC 7.4 02/27/2019    RBC 5.08 02/27/2019    HGB 14.3 02/27/2019    HCT 42.3 02/27/2019    MCV 83 02/27/2019    MCH 28.1 02/27/2019    MCHC 33.8 02/27/2019    RDW 14.3 02/27/2019     02/27/2019       BMP RESULTS:  Lab Results   Component Value Date     02/27/2019    POTASSIUM 4.0 02/27/2019    CHLORIDE 96 02/27/2019    CO2 30 02/27/2019    ANIONGAP 8 02/27/2019 "     (H) 02/27/2019    BUN 14 02/27/2019    CR 0.46 (L) 02/27/2019    GFRESTIMATED >90 02/27/2019    GFRESTBLACK >90 02/27/2019    LEIGH ANN 8.9 02/27/2019        A1C RESULTS:  No results found for: A1C    INR RESULTS:  Lab Results   Component Value Date    INR 1.03 05/02/2008    INR 1.04 03/13/2008       We greatly appreciate the opportunity to be involved in the care of your patient, Travis Villa.    Sincerely,  Ambrocio Valdez MD      CC  No referring provider defined for this encounter.

## 2020-08-31 ENCOUNTER — CARE COORDINATION (OUTPATIENT)
Dept: CARDIOLOGY | Facility: CLINIC | Age: 75
End: 2020-08-31

## 2020-08-31 ENCOUNTER — HOSPITAL ENCOUNTER (OUTPATIENT)
Dept: CARDIOLOGY | Facility: CLINIC | Age: 75
End: 2020-08-31
Attending: INTERNAL MEDICINE
Payer: COMMERCIAL

## 2020-08-31 DIAGNOSIS — I35.0 NONRHEUMATIC AORTIC VALVE STENOSIS: ICD-10-CM

## 2020-08-31 DIAGNOSIS — I25.10 CORONARY ARTERY DISEASE INVOLVING NATIVE CORONARY ARTERY OF NATIVE HEART WITHOUT ANGINA PECTORIS: ICD-10-CM

## 2020-08-31 DIAGNOSIS — I10 BENIGN ESSENTIAL HYPERTENSION: ICD-10-CM

## 2020-08-31 DIAGNOSIS — R07.9 CHEST PAIN, UNSPECIFIED TYPE: ICD-10-CM

## 2020-08-31 DIAGNOSIS — R06.09 DOE (DYSPNEA ON EXERTION): ICD-10-CM

## 2020-08-31 DIAGNOSIS — I25.10 CORONARY ARTERY DISEASE INVOLVING NATIVE CORONARY ARTERY OF NATIVE HEART WITHOUT ANGINA PECTORIS: Primary | ICD-10-CM

## 2020-08-31 PROCEDURE — 93306 TTE W/DOPPLER COMPLETE: CPT | Mod: 26 | Performed by: INTERNAL MEDICINE

## 2020-08-31 PROCEDURE — 93306 TTE W/DOPPLER COMPLETE: CPT

## 2020-08-31 NOTE — PROGRESS NOTES
Received call from cardiopulmonary stating pt is scheduled for Stress Echo but does not want to exercise. Discussed w/ , ok to change to Lexiscan and also get an Echocardiogram. Orders changed. JODIE Huffman

## 2020-09-03 ENCOUNTER — HOSPITAL ENCOUNTER (OUTPATIENT)
Dept: NUCLEAR MEDICINE | Facility: CLINIC | Age: 75
Setting detail: NUCLEAR MEDICINE
End: 2020-09-03
Attending: INTERNAL MEDICINE
Payer: COMMERCIAL

## 2020-09-03 ENCOUNTER — HOSPITAL ENCOUNTER (OUTPATIENT)
Dept: CARDIOLOGY | Facility: CLINIC | Age: 75
End: 2020-09-03
Attending: INTERNAL MEDICINE
Payer: COMMERCIAL

## 2020-09-03 VITALS — HEART RATE: 73 BPM | SYSTOLIC BLOOD PRESSURE: 150 MMHG | DIASTOLIC BLOOD PRESSURE: 84 MMHG

## 2020-09-03 DIAGNOSIS — R06.09 DOE (DYSPNEA ON EXERTION): ICD-10-CM

## 2020-09-03 DIAGNOSIS — R07.9 CHEST PAIN, UNSPECIFIED TYPE: ICD-10-CM

## 2020-09-03 DIAGNOSIS — I35.0 NONRHEUMATIC AORTIC VALVE STENOSIS: ICD-10-CM

## 2020-09-03 DIAGNOSIS — I25.10 CORONARY ARTERY DISEASE INVOLVING NATIVE CORONARY ARTERY OF NATIVE HEART WITHOUT ANGINA PECTORIS: ICD-10-CM

## 2020-09-03 LAB
CV STRESS MAX HR HE: 85
NUC STRESS EJECTION FRACTION: 78 %
RATE PRESSURE PRODUCT: NORMAL
STRESS ECHO BASELINE DIASTOLIC HE: 84
STRESS ECHO BASELINE HR: 73
STRESS ECHO BASELINE SYSTOLIC BP: 150
STRESS ECHO CALCULATED PERCENT HR: 59 %
STRESS ECHO LAST STRESS DIASTOLIC BP: 88
STRESS ECHO LAST STRESS SYSTOLIC BP: 164
STRESS ECHO TARGET HR: 143

## 2020-09-03 PROCEDURE — 93017 CV STRESS TEST TRACING ONLY: CPT

## 2020-09-03 PROCEDURE — 93016 CV STRESS TEST SUPVJ ONLY: CPT | Performed by: INTERNAL MEDICINE

## 2020-09-03 PROCEDURE — 25000128 H RX IP 250 OP 636

## 2020-09-03 PROCEDURE — A9502 TC99M TETROFOSMIN: HCPCS | Performed by: INTERNAL MEDICINE

## 2020-09-03 PROCEDURE — 93018 CV STRESS TEST I&R ONLY: CPT | Performed by: INTERNAL MEDICINE

## 2020-09-03 PROCEDURE — 78452 HT MUSCLE IMAGE SPECT MULT: CPT | Mod: 26 | Performed by: INTERNAL MEDICINE

## 2020-09-03 PROCEDURE — 78452 HT MUSCLE IMAGE SPECT MULT: CPT

## 2020-09-03 PROCEDURE — 34300033 ZZH RX 343: Performed by: INTERNAL MEDICINE

## 2020-09-03 RX ORDER — AMINOPHYLLINE 25 MG/ML
INJECTION, SOLUTION INTRAVENOUS
Status: DISCONTINUED
Start: 2020-09-03 | End: 2020-09-03 | Stop reason: HOSPADM

## 2020-09-03 RX ORDER — REGADENOSON 0.08 MG/ML
INJECTION, SOLUTION INTRAVENOUS
Status: COMPLETED
Start: 2020-09-03 | End: 2020-09-03

## 2020-09-03 RX ORDER — AMINOPHYLLINE 25 MG/ML
50-100 INJECTION, SOLUTION INTRAVENOUS
Status: DISCONTINUED | OUTPATIENT
Start: 2020-09-03 | End: 2020-09-04 | Stop reason: HOSPADM

## 2020-09-03 RX ORDER — ALBUTEROL SULFATE 90 UG/1
2 AEROSOL, METERED RESPIRATORY (INHALATION) EVERY 5 MIN PRN
Status: DISCONTINUED | OUTPATIENT
Start: 2020-09-03 | End: 2020-09-04 | Stop reason: HOSPADM

## 2020-09-03 RX ORDER — REGADENOSON 0.08 MG/ML
0.4 INJECTION, SOLUTION INTRAVENOUS ONCE
Status: COMPLETED | OUTPATIENT
Start: 2020-09-03 | End: 2020-09-03

## 2020-09-03 RX ORDER — ACYCLOVIR 200 MG/1
0-1 CAPSULE ORAL
Status: DISCONTINUED | OUTPATIENT
Start: 2020-09-03 | End: 2020-09-04 | Stop reason: HOSPADM

## 2020-09-03 RX ADMIN — REGADENOSON 0.4 MG: 0.08 INJECTION, SOLUTION INTRAVENOUS at 13:44

## 2020-09-03 RX ADMIN — TETROFOSMIN 10.3 MCI.: 1.38 INJECTION, POWDER, LYOPHILIZED, FOR SOLUTION INTRAVENOUS at 11:42

## 2020-09-03 RX ADMIN — TETROFOSMIN 31 MCI.: 1.38 INJECTION, POWDER, LYOPHILIZED, FOR SOLUTION INTRAVENOUS at 13:45

## 2020-10-02 DIAGNOSIS — I10 ESSENTIAL HYPERTENSION, BENIGN: ICD-10-CM

## 2020-10-02 DIAGNOSIS — I25.10 CORONARY ARTERY DISEASE INVOLVING NATIVE HEART, ANGINA PRESENCE UNSPECIFIED, UNSPECIFIED VESSEL OR LESION TYPE: ICD-10-CM

## 2020-10-02 RX ORDER — CARVEDILOL 25 MG/1
25 TABLET ORAL 2 TIMES DAILY WITH MEALS
Qty: 180 TABLET | Refills: 3 | Status: SHIPPED | OUTPATIENT
Start: 2020-10-02 | End: 2021-10-26

## 2020-10-02 RX ORDER — INDAPAMIDE 1.25 MG/1
1.25 TABLET ORAL EVERY MORNING
Qty: 90 TABLET | Refills: 3 | Status: SHIPPED | OUTPATIENT
Start: 2020-10-02 | End: 2021-10-26

## 2020-10-02 RX ORDER — RAMIPRIL 10 MG/1
10 CAPSULE ORAL 2 TIMES DAILY
Qty: 180 CAPSULE | Refills: 3 | Status: SHIPPED | OUTPATIENT
Start: 2020-10-02 | End: 2021-10-26

## 2020-10-05 ENCOUNTER — DOCUMENTATION ONLY (OUTPATIENT)
Dept: CARDIOLOGY | Facility: CLINIC | Age: 75
End: 2020-10-05

## 2020-10-05 NOTE — PROGRESS NOTES
Wellness Screening Tool    Symptom Screening:    Do you have one of the following NEW symptoms:      Fever (subjective or >100.0)?  No    New cough? No    Shortness of breath? No    Chills? No    New loss of taste or smell? No    Generalized body aches? No    New persistent headache? No    New sore throat? No    Nausea, vomiting or diarrhea? No    Within the past 2 weeks, have you been exposed to someone with a known positive illness below?      COVID - 19 (known or suspected) No    Chicken pox?  No    Measles? No    Pertussis? No    Have you had a positive COVID-19 diagnostic test (nasal swab test) in the last 14 days or are you currently   on self-quarantine restrictions (i.e.travel restriction, exposure, etc?) No      Patient notified of visitor restriction: Yes  Patient informed to wear a mask: Yes    JODIE Huffman

## 2020-10-06 ENCOUNTER — OFFICE VISIT (OUTPATIENT)
Dept: CARDIOLOGY | Facility: CLINIC | Age: 75
End: 2020-10-06
Attending: INTERNAL MEDICINE
Payer: COMMERCIAL

## 2020-10-06 VITALS
WEIGHT: 273 LBS | DIASTOLIC BLOOD PRESSURE: 73 MMHG | HEIGHT: 74 IN | SYSTOLIC BLOOD PRESSURE: 127 MMHG | BODY MASS INDEX: 35.04 KG/M2 | HEART RATE: 74 BPM

## 2020-10-06 DIAGNOSIS — I10 BENIGN ESSENTIAL HYPERTENSION: ICD-10-CM

## 2020-10-06 DIAGNOSIS — I35.0 NONRHEUMATIC AORTIC VALVE STENOSIS: ICD-10-CM

## 2020-10-06 DIAGNOSIS — I25.10 CORONARY ARTERY DISEASE INVOLVING NATIVE CORONARY ARTERY OF NATIVE HEART WITHOUT ANGINA PECTORIS: ICD-10-CM

## 2020-10-06 PROCEDURE — 99213 OFFICE O/P EST LOW 20 MIN: CPT | Performed by: INTERNAL MEDICINE

## 2020-10-06 ASSESSMENT — MIFFLIN-ST. JEOR: SCORE: 2033.07

## 2020-10-06 NOTE — PROGRESS NOTES
HISTORY OF PRESENT ILLNESS:  TTE mild to moderate aortic stenosis. Nuclear stress not suggestive of significant ischemia. Now is convinced weight gain is problem and plans to lose weight.     Orders this Visit:  No orders of the defined types were placed in this encounter.    No orders of the defined types were placed in this encounter.    There are no discontinued medications.    Encounter Diagnoses   Name Primary?     Benign essential hypertension      Coronary artery disease involving native coronary artery of native heart without angina pectoris      Nonrheumatic aortic valve stenosis        CURRENT MEDICATIONS:  Current Outpatient Medications   Medication Sig Dispense Refill     amLODIPine (NORVASC) 2.5 MG tablet Take 2 tablets (5 mg) by mouth 2 times daily 360 tablet 0     Ascorbic Acid (VITAMIN C PO) Take 1,000 mg by mouth daily        ASPIRIN PO Take 81 mg by mouth 2 times daily.       calcium-magnesium (CALMAG) 500-250 MG TABS Take 1 tablet by mouth At Bedtime        carvedilol (COREG) 25 MG tablet Take 1 tablet (25 mg) by mouth 2 times daily (with meals) 180 tablet 3     Cholecalciferol (VITAMIN D3 PO) Take 10,000 Units by mouth At Bedtime        Coenzyme Q-10 60 MG CAPS Take 60 mg by mouth At Bedtime 30 capsule 0     Flaxseed, Linseed, OIL Take 1,300 mg by mouth daily        FOLIC ACID PO Take 400 mcg by mouth At Bedtime        garlic 150 MG TABS Take by mouth At Bedtime        GLUCOSAMINE SULFATE PO Take 1,500 mg by mouth daily        indapamide (LOZOL) 1.25 MG tablet Take 1 tablet (1.25 mg) by mouth every morning 90 tablet 3     METFORMIN HCL PO Take 500 mg by mouth 2 times daily (with meals)        multivitamin, therapeutic with minerals (MULTI-VITAMIN) TABS Take 1 tablet by mouth daily.       Niacin, Antihyperlipidemic, (NIASPAN PO) Take 500 mg by mouth 2 times daily        nitroGLYcerin (NITROSTAT) 0.4 MG sublingual tablet Place 1 tablet (0.4 mg) under the tongue every 5 minutes as needed for chest  "pain 100 tablet 1     Omega-3 Fatty Acids (OMEGA-3 FISH OIL PO) Take 2 g by mouth 2 times daily (with meals)        ramipril (ALTACE) 10 MG capsule Take 1 capsule (10 mg) by mouth 2 times daily 180 capsule 3     Resveratrol 100 MG CAPS Take 100 mg by mouth At Bedtime        Saw Palmetto, Serenoa repens, (SAW PALMETTO CONCENTRATE OR) Take 500 mg by mouth At Bedtime          ALLERGIES     Allergies   Allergen Reactions     Atorvastatin      Muscle aches       PAST MEDICAL, SURGICAL, FAMILY, SOCIAL HISTORY:  History was reviewed and updated as needed, see medical record.    Review of Systems:  A 12-point review of systems was completed, see medical record for detailed review of systems information.    Physical Exam:  Vitals: /73   Pulse 74   Ht 1.88 m (6' 2\")   Wt 123.8 kg (273 lb)   BMI 35.05 kg/m      Constitutional:           Skin:           Head:           Eyes:           ENT:           Neck:           Chest:           Cardiac:                    Abdomen:           Vascular:                                        Extremities and Back:           Neurological:           ASSESSMENT: No evidence of severe aortic stenosis or severe ischemia.       RECOMMENDATIONS: wt loss exercise and diet follow-up one year.       Recent Lab Results:  LIPID RESULTS:  Lab Results   Component Value Date    CHOL 190 12/06/2017    HDL 32 12/06/2017     12/06/2017    TRIG 229 12/06/2017    CHOLHDLRATIO 3.9 03/13/2014       LIVER ENZYME RESULTS:  Lab Results   Component Value Date    AST 33 12/29/2012    ALT 6 03/13/2014       CBC RESULTS:  Lab Results   Component Value Date    WBC 7.4 02/27/2019    RBC 5.08 02/27/2019    HGB 14.3 02/27/2019    HCT 42.3 02/27/2019    MCV 83 02/27/2019    MCH 28.1 02/27/2019    MCHC 33.8 02/27/2019    RDW 14.3 02/27/2019     02/27/2019       BMP RESULTS:  Lab Results   Component Value Date     02/27/2019    POTASSIUM 4.0 02/27/2019    CHLORIDE 96 02/27/2019    CO2 30 02/27/2019 "    ANIONGAP 8 02/27/2019     (H) 02/27/2019    BUN 14 02/27/2019    CR 0.46 (L) 02/27/2019    GFRESTIMATED >90 02/27/2019    GFRESTBLACK >90 02/27/2019    LEIGH ANN 8.9 02/27/2019        A1C RESULTS:  No results found for: A1C    INR RESULTS:  Lab Results   Component Value Date    INR 1.03 05/02/2008    INR 1.04 03/13/2008       We greatly appreciate the opportunity to be involved in the care of your patient, Travis Villa.    Sincerely,  Ambrocio Valdez MD        Ambrocio Valdez MD  [address]  [place] [address]

## 2020-10-06 NOTE — LETTER
10/6/2020    Nomi Gil MD  [facility]  [address]  [facility] [address]    RE: Travis Villa       Dear Colleague,    I had the pleasure of seeing Travis Villa in the [facility].    HISTORY OF PRESENT ILLNESS:  TTE mild to moderate aortic stenosis. Nuclear stress not suggestive of significant ischemia. Now is convinced weight gain is problem and plans to lose weight.     Orders this Visit:  No orders of the defined types were placed in this encounter.    No orders of the defined types were placed in this encounter.    There are no discontinued medications.    Encounter Diagnoses   Name Primary?     Benign essential hypertension      Coronary artery disease involving native coronary artery of native heart without angina pectoris      Nonrheumatic aortic valve stenosis        CURRENT MEDICATIONS:  Current Outpatient Medications   Medication Sig Dispense Refill     amLODIPine (NORVASC) 2.5 MG tablet Take 2 tablets (5 mg) by mouth 2 times daily 360 tablet 0     Ascorbic Acid (VITAMIN C PO) Take 1,000 mg by mouth daily        ASPIRIN PO Take 81 mg by mouth 2 times daily.       calcium-magnesium (CALMAG) 500-250 MG TABS Take 1 tablet by mouth At Bedtime        carvedilol (COREG) 25 MG tablet Take 1 tablet (25 mg) by mouth 2 times daily (with meals) 180 tablet 3     Cholecalciferol (VITAMIN D3 PO) Take 10,000 Units by mouth At Bedtime        Coenzyme Q-10 60 MG CAPS Take 60 mg by mouth At Bedtime 30 capsule 0     Flaxseed, Linseed, OIL Take 1,300 mg by mouth daily        FOLIC ACID PO Take 400 mcg by mouth At Bedtime        garlic 150 MG TABS Take by mouth At Bedtime        GLUCOSAMINE SULFATE PO Take 1,500 mg by mouth daily        indapamide (LOZOL) 1.25 MG tablet Take 1 tablet (1.25 mg) by mouth every morning 90 tablet 3     METFORMIN HCL PO Take 500 mg by mouth 2 times daily (with meals)        multivitamin, therapeutic with minerals  "(MULTI-VITAMIN) TABS Take 1 tablet by mouth daily.       Niacin, Antihyperlipidemic, (NIASPAN PO) Take 500 mg by mouth 2 times daily        nitroGLYcerin (NITROSTAT) 0.4 MG sublingual tablet Place 1 tablet (0.4 mg) under the tongue every 5 minutes as needed for chest pain 100 tablet 1     Omega-3 Fatty Acids (OMEGA-3 FISH OIL PO) Take 2 g by mouth 2 times daily (with meals)        ramipril (ALTACE) 10 MG capsule Take 1 capsule (10 mg) by mouth 2 times daily 180 capsule 3     Resveratrol 100 MG CAPS Take 100 mg by mouth At Bedtime        Saw Palmetto, Serenoa repens, (SAW PALMETTO CONCENTRATE OR) Take 500 mg by mouth At Bedtime          ALLERGIES     Allergies   Allergen Reactions     Atorvastatin      Muscle aches       PAST MEDICAL, SURGICAL, FAMILY, SOCIAL HISTORY:  History was reviewed and updated as needed, see medical record.    Review of Systems:  A 12-point review of systems was completed, see medical record for detailed review of systems information.    Physical Exam:  Vitals: /73   Pulse 74   Ht 1.88 m (6' 2\")   Wt 123.8 kg (273 lb)   BMI 35.05 kg/m      Constitutional:           Skin:           Head:           Eyes:           ENT:           Neck:           Chest:           Cardiac:                    Abdomen:           Vascular:                                        Extremities and Back:           Neurological:           ASSESSMENT: No evidence of severe aortic stenosis or severe ischemia.       RECOMMENDATIONS: wt loss exercise and diet follow-up one year.       Recent Lab Results:  LIPID RESULTS:  Lab Results   Component Value Date    CHOL 190 12/06/2017    HDL 32 12/06/2017     12/06/2017    TRIG 229 12/06/2017    CHOLHDLRATIO 3.9 03/13/2014       LIVER ENZYME RESULTS:  Lab Results   Component Value Date    AST 33 12/29/2012    ALT 6 03/13/2014       CBC RESULTS:  Lab Results   Component Value Date    WBC 7.4 02/27/2019    RBC 5.08 02/27/2019    HGB 14.3 02/27/2019    HCT 42.3 " 02/27/2019    MCV 83 02/27/2019    MCH 28.1 02/27/2019    MCHC 33.8 02/27/2019    RDW 14.3 02/27/2019     02/27/2019       BMP RESULTS:  Lab Results   Component Value Date     02/27/2019    POTASSIUM 4.0 02/27/2019    CHLORIDE 96 02/27/2019    CO2 30 02/27/2019    ANIONGAP 8 02/27/2019     (H) 02/27/2019    BUN 14 02/27/2019    CR 0.46 (L) 02/27/2019    GFRESTIMATED >90 02/27/2019    GFRESTBLACK >90 02/27/2019    LEIGH ANN 8.9 02/27/2019        A1C RESULTS:  No results found for: A1C    INR RESULTS:  Lab Results   Component Value Date    INR 1.03 05/02/2008    INR 1.04 03/13/2008       Service Date: 10/06/2020      HISTORY OF PRESENT ILLNESS:  Travis Villa, a 75-year-old man with coronary artery disease, aortic stenosis, hypertension, type 2 diabetes mellitus, osteoarthritis, obesity and prostatic hypertrophy, was seen today for followup of recent cardiac testing.      I last saw the patient about 2 months ago.  He described a sense of nonspecific  fatigue with exertion.  There was no typical chest, arm, neck, jaw or back discomfort.  He denies syncope, orthopnea or PND.  We noted he gained a great deal of weight since I had last seen him..      The patient underwent echocardiogram on 08/31/2020 demonstrating a mild to at most moderate aortic stenosis with a mean systolic gradient of 23, a valve area of 1.6 cm2.  The ejection fraction was normal.  There were no regional wall motion abnormalities.  Nuclear  stress testing showed an ejection fraction of 78% with a small area of mild fixed defect in the inferior and mid basal segments.  No ischemia was identified.      Since I last saw the patient, he is now quite convinced that weight gain is the primary problem with his exercise tolerance.  He remains free of chest discomfort, syncope or dyspnea.      PHYSICAL EXAMINATION:   GENERAL:  Exam today demonstrates a very pleasant, cooperative 75-year-old man who is overweight.   VITAL SIGNS:  His blood  pressure is 127/73, his heart rate 74.  His height is 1.9 meters, his weight is 123.8 kg for a BMI of 35.   LUNGS:  Clear to percussion and auscultation.   CARDIOVASCULAR:  Shows a normal S1 with a normal S2.  There is a grade 2/6 systolic ejection murmur at the left sternal border, unchanged from his last exam.      ASSESSMENT:  Noninvasive testing does not suggest that the patient's aortic stenosis is severe enough to cause symptoms.  Likewise, his nuclear stress test does not suggest significant ischemia.  I agree with the patient that the most important intervention at this point would be for him to lose weight and participate in a regular exercise program.  If his symptoms should change or progress, we could always offer invasive testing.      RECOMMENDATIONS:   1.  Mediterranean-style weight loss diet.   2.  Regular exercise program at least 45 minutes 4-7 times a week.   3.  Followup visit in about 1 year, earlier if any new symptoms.  We will decide in future years on the timing of a repeat echo tests.      We greatly appreciate the opportunity to participate in the care of your patient, Mr. Catracho Mike.      cc:      [place] [address]         AMBROCIO VALDEZ MD             D: 10/06/2020   T: 10/06/2020   MT: DOMINGO      Name:     CATRACHO MIKE   MRN:      [id number]        Account:      [id number]   :      1945           Service Date: 10/06/2020      Document: [id number]        Thank you for allowing me to participate in the care of your patient.    Sincerely,     Ambrocio Valdez MD     [facility]

## 2020-10-06 NOTE — PROGRESS NOTES
Service Date: 10/06/2020      HISTORY OF PRESENT ILLNESS:  Travis Villa, a 75-year-old man with coronary artery disease, aortic stenosis, hypertension, type 2 diabetes mellitus, osteoarthritis, obesity and prostatic hypertrophy, was seen today for followup of recent cardiac testing.      I last saw the patient about 2 months ago.  He described a sense of nonspecific  fatigue with exertion.  There was no typical chest, arm, neck, jaw or back discomfort.  He denies syncope, orthopnea or PND.  We noted he gained a great deal of weight since I had last seen him..      The patient underwent echocardiogram on 08/31/2020 demonstrating a mild to at most moderate aortic stenosis with a mean systolic gradient of 23, a valve area of 1.6 cm2.  The ejection fraction was normal.  There were no regional wall motion abnormalities.  Nuclear  stress testing showed an ejection fraction of 78% with a small area of mild fixed defect in the inferior and mid basal segments.  No ischemia was identified.      Since I last saw the patient, he is now quite convinced that weight gain is the primary problem with his exercise tolerance.  He remains free of chest discomfort, syncope or dyspnea.      PHYSICAL EXAMINATION:   GENERAL:  Exam today demonstrates a very pleasant, cooperative 75-year-old man who is overweight.   VITAL SIGNS:  His blood pressure is 127/73, his heart rate 74.  His height is 1.9 meters, his weight is 123.8 kg for a BMI of 35.   LUNGS:  Clear to percussion and auscultation.   CARDIOVASCULAR:  Shows a normal S1 with a normal S2.  There is a grade 2/6 systolic ejection murmur at the left sternal border, unchanged from his last exam.      ASSESSMENT:  Noninvasive testing does not suggest that the patient's aortic stenosis is severe enough to cause symptoms.  Likewise, his nuclear stress test does not suggest significant ischemia.  I agree with the patient that the most important intervention at this point would be for him to  lose weight and participate in a regular exercise program.  If his symptoms should change or progress, we could always offer invasive testing.      RECOMMENDATIONS:   1.  Mediterranean-style weight loss diet.   2.  Regular exercise program at least 45 minutes 4-7 times a week.   3.  Followup visit in about 1 year, earlier if any new symptoms.  We will decide in future years on the timing of a repeat echo tests.      We greatly appreciate the opportunity to participate in the care of your patient, Mr. Catracho Mike.      cc:      [place] [address]         JAYDEN ALSTON MD             D: 10/06/2020   T: 10/06/2020   MT: DOMINGO      Name:     CATRACHO MIKE   MRN:      [id number]        Account:      [id number]   :      1945           Service Date: 10/06/2020      Document: [id number]

## 2020-10-12 DIAGNOSIS — I10 ESSENTIAL HYPERTENSION, BENIGN: ICD-10-CM

## 2020-10-12 DIAGNOSIS — I25.10 CORONARY ARTERY DISEASE INVOLVING NATIVE CORONARY ARTERY OF NATIVE HEART WITHOUT ANGINA PECTORIS: ICD-10-CM

## 2020-10-12 DIAGNOSIS — I25.10 CORONARY ARTERY DISEASE INVOLVING NATIVE HEART, ANGINA PRESENCE UNSPECIFIED, UNSPECIFIED VESSEL OR LESION TYPE: ICD-10-CM

## 2020-10-12 RX ORDER — AMLODIPINE BESYLATE 2.5 MG/1
5 TABLET ORAL 2 TIMES DAILY
Qty: 360 TABLET | Refills: 3 | Status: SHIPPED | OUTPATIENT
Start: 2020-10-12 | End: 2021-10-26

## 2020-11-19 DIAGNOSIS — I25.10 CORONARY ARTERY DISEASE INVOLVING NATIVE HEART, ANGINA PRESENCE UNSPECIFIED, UNSPECIFIED VESSEL OR LESION TYPE: ICD-10-CM

## 2020-11-19 RX ORDER — NITROGLYCERIN 0.4 MG/1
0.4 TABLET SUBLINGUAL EVERY 5 MIN PRN
Qty: 25 TABLET | Refills: 3 | Status: SHIPPED | OUTPATIENT
Start: 2020-11-19

## 2021-10-11 ENCOUNTER — OFFICE VISIT (OUTPATIENT)
Dept: CARDIOLOGY | Facility: CLINIC | Age: 76
End: 2021-10-11
Payer: COMMERCIAL

## 2021-10-11 VITALS
SYSTOLIC BLOOD PRESSURE: 132 MMHG | BODY MASS INDEX: 31.7 KG/M2 | HEART RATE: 59 BPM | WEIGHT: 247 LBS | HEIGHT: 74 IN | DIASTOLIC BLOOD PRESSURE: 80 MMHG

## 2021-10-11 DIAGNOSIS — I10 BENIGN ESSENTIAL HYPERTENSION: ICD-10-CM

## 2021-10-11 DIAGNOSIS — I25.10 CORONARY ARTERY DISEASE INVOLVING NATIVE CORONARY ARTERY OF NATIVE HEART WITHOUT ANGINA PECTORIS: Primary | ICD-10-CM

## 2021-10-11 DIAGNOSIS — I10 ESSENTIAL HYPERTENSION, BENIGN: ICD-10-CM

## 2021-10-11 DIAGNOSIS — I35.0 NONRHEUMATIC AORTIC VALVE STENOSIS: ICD-10-CM

## 2021-10-11 PROCEDURE — 99214 OFFICE O/P EST MOD 30 MIN: CPT | Performed by: INTERNAL MEDICINE

## 2021-10-11 ASSESSMENT — MIFFLIN-ST. JEOR: SCORE: 1910.13

## 2021-10-11 NOTE — PROGRESS NOTES
HISTORY OF PRESENT ILLNESS:  [place].. Expensive remodeling project planning to move in one year. 5 girls has lost one daughter and one son. 11 grand kids 8 great grankids. At home 120/70s/  Has lost about 50 lbs. Peripheral neuropathy dysesthesia at nite involving soles of feet.    Orders this Visit:  No orders of the defined types were placed in this encounter.    No orders of the defined types were placed in this encounter.    There are no discontinued medications.    No diagnosis found.    CURRENT MEDICATIONS:  Current Outpatient Medications   Medication Sig Dispense Refill     amLODIPine (NORVASC) 2.5 MG tablet Take 2 tablets (5 mg) by mouth 2 times daily (Patient taking differently: Take 2.5 mg by mouth 2 times daily ) 360 tablet 3     Ascorbic Acid (VITAMIN C PO) Take 1,000 mg by mouth daily        ASPIRIN PO Take 81 mg by mouth 2 times daily.       calcium-magnesium (CALMAG) 500-250 MG TABS Take 1 tablet by mouth At Bedtime        carvedilol (COREG) 25 MG tablet Take 1 tablet (25 mg) by mouth 2 times daily (with meals) 180 tablet 3     Cholecalciferol (VITAMIN D3 PO) Take 10,000 Units by mouth At Bedtime        Coenzyme Q-10 60 MG CAPS Take 60 mg by mouth At Bedtime 30 capsule 0     Flaxseed, Linseed, OIL Take 1,300 mg by mouth daily        FOLIC ACID PO Take 400 mcg by mouth At Bedtime        garlic 150 MG TABS Take by mouth At Bedtime        GLUCOSAMINE SULFATE PO Take 1,500 mg by mouth daily        indapamide (LOZOL) 1.25 MG tablet Take 1 tablet (1.25 mg) by mouth every morning 90 tablet 3     METFORMIN HCL PO Take 500 mg by mouth 2 times daily (with meals)        multivitamin, therapeutic with minerals (MULTI-VITAMIN) TABS Take 1 tablet by mouth daily.       Niacin, Antihyperlipidemic, (NIASPAN PO) Take 500 mg by mouth 2 times daily        nitroGLYcerin (NITROSTAT) 0.4 MG sublingual tablet Place 1 tablet (0.4 mg) under the tongue every 5 minutes as needed for chest pain 25 tablet 3  "    Omega-3 Fatty Acids (OMEGA-3 FISH OIL PO) Take 2 g by mouth 2 times daily (with meals)        ramipril (ALTACE) 10 MG capsule Take 1 capsule (10 mg) by mouth 2 times daily 180 capsule 3     Resveratrol 100 MG CAPS Take 100 mg by mouth At Bedtime        Saw Palmetto, Serenoa repens, (SAW PALMETTO CONCENTRATE OR) Take 500 mg by mouth At Bedtime          ALLERGIES     Allergies   Allergen Reactions     Atorvastatin      Muscle aches       PAST MEDICAL, SURGICAL, FAMILY, SOCIAL HISTORY:  History was reviewed and updated as needed, see medical record.    Review of Systems:  A 12-point review of systems was completed, see medical record for detailed review of systems information.    Physical Exam:  Vitals: BP (!) 147/84   Pulse 59   Ht 1.88 m (6' 2\")   Wt 112 kg (247 lb)   BMI 31.71 kg/m      Constitutional:           Skin:           Head:           Eyes:           ENT:           Neck:           Chest:           Cardiac:                    Abdomen:           Vascular:                                        Extremities and Back:           Neurological:           ASSESSMENT: stable will need follow up TTE       RECOMMENDATIONS:   TTE now   Follow-up in one year      Recent Lab Results:  LIPID RESULTS:  Lab Results   Component Value Date    CHOL 190 12/06/2017    HDL 32 12/06/2017     12/06/2017    TRIG 229 12/06/2017    CHOLHDLRATIO 3.9 03/13/2014       LIVER ENZYME RESULTS:  Lab Results   Component Value Date    AST 33 12/29/2012    ALT 6 03/13/2014       CBC RESULTS:  Lab Results   Component Value Date    WBC 7.4 02/27/2019    RBC 5.08 02/27/2019    HGB 14.3 02/27/2019    HCT 42.3 02/27/2019    MCV 83 02/27/2019    MCH 28.1 02/27/2019    MCHC 33.8 02/27/2019    RDW 14.3 02/27/2019     02/27/2019       BMP RESULTS:  Lab Results   Component Value Date     02/27/2019    POTASSIUM 4.0 02/27/2019    CHLORIDE 96 02/27/2019    CO2 30 02/27/2019    ANIONGAP 8 02/27/2019     (H) 02/27/2019    BUN " 14 02/27/2019    CR 0.46 (L) 02/27/2019    GFRESTIMATED >90 02/27/2019    GFRESTBLACK >90 02/27/2019    LEIGH ANN 8.9 02/27/2019        A1C RESULTS:  No results found for: A1C    INR RESULTS:  Lab Results   Component Value Date    INR 1.03 05/02/2008    INR 1.04 03/13/2008       We greatly appreciate the opportunity to be involved in the care of your patient, Travis Villa.    Sincerely,  Ambrocio Valdez MD      CC  No referring provider defined for this encounter.

## 2021-10-11 NOTE — LETTER
10/11/2021    Marce Loving MD  [facility] [address]  [facility] [address]    RE: Travis Villa       Dear Colleague,    I had the pleasure of seeing Travis Villa in the [facility].    HISTORY OF PRESENT ILLNESS:  Has a [place] home in [place].. Expensive remodeling project planning to move in one year. 5 girls has lost one daughter and one son. 11 grand kids 8 great grankids. At home 120/70s/  Has lost about 50 lbs. Peripheral neuropathy dysesthesia at nite involving soles of feet.    Orders this Visit:  No orders of the defined types were placed in this encounter.    No orders of the defined types were placed in this encounter.    There are no discontinued medications.    No diagnosis found.    CURRENT MEDICATIONS:  Current Outpatient Medications   Medication Sig Dispense Refill     amLODIPine (NORVASC) 2.5 MG tablet Take 2 tablets (5 mg) by mouth 2 times daily (Patient taking differently: Take 2.5 mg by mouth 2 times daily ) 360 tablet 3     Ascorbic Acid (VITAMIN C PO) Take 1,000 mg by mouth daily        ASPIRIN PO Take 81 mg by mouth 2 times daily.       calcium-magnesium (CALMAG) 500-250 MG TABS Take 1 tablet by mouth At Bedtime        carvedilol (COREG) 25 MG tablet Take 1 tablet (25 mg) by mouth 2 times daily (with meals) 180 tablet 3     Cholecalciferol (VITAMIN D3 PO) Take 10,000 Units by mouth At Bedtime        Coenzyme Q-10 60 MG CAPS Take 60 mg by mouth At Bedtime 30 capsule 0     Flaxseed, Linseed, OIL Take 1,300 mg by mouth daily        FOLIC ACID PO Take 400 mcg by mouth At Bedtime        garlic 150 MG TABS Take by mouth At Bedtime        GLUCOSAMINE SULFATE PO Take 1,500 mg by mouth daily        indapamide (LOZOL) 1.25 MG tablet Take 1 tablet (1.25 mg) by mouth every morning 90 tablet 3     METFORMIN HCL PO Take 500 mg by mouth 2 times daily (with meals)        multivitamin, therapeutic with minerals  "(MULTI-VITAMIN) TABS Take 1 tablet by mouth daily.       Niacin, Antihyperlipidemic, (NIASPAN PO) Take 500 mg by mouth 2 times daily        nitroGLYcerin (NITROSTAT) 0.4 MG sublingual tablet Place 1 tablet (0.4 mg) under the tongue every 5 minutes as needed for chest pain 25 tablet 3     Omega-3 Fatty Acids (OMEGA-3 FISH OIL PO) Take 2 g by mouth 2 times daily (with meals)        ramipril (ALTACE) 10 MG capsule Take 1 capsule (10 mg) by mouth 2 times daily 180 capsule 3     Resveratrol 100 MG CAPS Take 100 mg by mouth At Bedtime        Saw Palmetto, Serenoa repens, (SAW PALMETTO CONCENTRATE OR) Take 500 mg by mouth At Bedtime          ALLERGIES     Allergies   Allergen Reactions     Atorvastatin      Muscle aches       PAST MEDICAL, SURGICAL, FAMILY, SOCIAL HISTORY:  History was reviewed and updated as needed, see medical record.    Review of Systems:  A 12-point review of systems was completed, see medical record for detailed review of systems information.    Physical Exam:  Vitals: BP (!) 147/84   Pulse 59   Ht 1.88 m (6' 2\")   Wt 112 kg (247 lb)   BMI 31.71 kg/m      Constitutional:           Skin:           Head:           Eyes:           ENT:           Neck:           Chest:           Cardiac:                    Abdomen:           Vascular:                                        Extremities and Back:           Neurological:           ASSESSMENT: stable will need follow up TTE       RECOMMENDATIONS:   TTE now   Follow-up in one year      Recent Lab Results:  LIPID RESULTS:  Lab Results   Component Value Date    CHOL 190 12/06/2017    HDL 32 12/06/2017     12/06/2017    TRIG 229 12/06/2017    CHOLHDLRATIO 3.9 03/13/2014       LIVER ENZYME RESULTS:  Lab Results   Component Value Date    AST 33 12/29/2012    ALT 6 03/13/2014       CBC RESULTS:  Lab Results   Component Value Date    WBC 7.4 02/27/2019    RBC 5.08 02/27/2019    HGB 14.3 02/27/2019    HCT 42.3 02/27/2019    MCV 83 02/27/2019    MCH 28.1 " 02/27/2019    MCHC 33.8 02/27/2019    RDW 14.3 02/27/2019     02/27/2019       BMP RESULTS:  Lab Results   Component Value Date     02/27/2019    POTASSIUM 4.0 02/27/2019    CHLORIDE 96 02/27/2019    CO2 30 02/27/2019    ANIONGAP 8 02/27/2019     (H) 02/27/2019    BUN 14 02/27/2019    CR 0.46 (L) 02/27/2019    GFRESTIMATED >90 02/27/2019    GFRESTBLACK >90 02/27/2019    LEIGH ANN 8.9 02/27/2019        A1C RESULTS:  No results found for: A1C    INR RESULTS:  Lab Results   Component Value Date    INR 1.03 05/02/2008    INR 1.04 03/13/2008       We greatly appreciate the opportunity to be involved in the care of your patient, Travis Villa.    Sincerely,  Ambrocio Valdez MD      CC  No referring provider defined for this encounter.                                                                         Thank you for allowing me to participate in the care of your patient.      Sincerely,     Ambrocio Valdez MD     [facility]  cc:   No referring provider defined for this encounter.

## 2021-10-11 NOTE — PROGRESS NOTES
Service Date: 10/11/2021    HISTORY OF PRESENT ILLNESS:  Travis Villa, a 76-year-old man with coronary artery disease, aortic stenosis, hypertension, type 2 diabetes mellitus, osteoarthritis, obesity and prostatic hypertrophy, was seen today at your request for followup.    Since last seen, Mr. Villa remains asymptomatic.  He has lost about 50 pounds through exercise and diet and feels much better.  He specifically denies syncope, chest pain or dyspnea.    The patient is in the process of remodeling his lake home in [place], [place].    PAST MEDICAL HISTORY:    1.  Aortic stenosis, last echocardiogram 2020 showing a mean systolic gradient of 23 with an ejection fraction of 65% and valve area of 1.6 cm2.  2.  Obesity.  3.  Hypertension.  4.  Dyslipidemia.  5.  Type 2 diabetes mellitus.  6.  Osteoarthritis.  7. CAD old history PCI CX /RCA 2008    PHYSICAL EXAMINATION:    GENERAL:  Exam today demonstrates a very pleasant, cooperative and intelligent 76-year-old man.  VITAL SIGNS:  His blood pressure is 132/80, his heart rate is 59.  His height is 1.9 meters, his weight is 112 kg, down approximately 12 kg since last seen in 10/2020.  His BMI is 31.71.  RESPIRATORY:  His lungs are clear to percussion and auscultation.  CARDIOVASCULAR:  Shows a normal S1 with a normal S2.  There is a grade 2/6 systolic ejection murmur.  His pulses are full and symmetrical.    MEDICATIONS:    1.  Amlodipine 5 mg daily.  2.  Aspirin 81 mg daily.  3.  Carvedilol 25 mg b.i.d.    4.  Lozol (indapamide) 1.25 mg daily.  5.  Metformin 500 mg twice a day.  6.  Nitroglycerin sublingual p.r.n.  7.  Ramipril 10 mg daily.    LABORATORY STUDIES:  His creatinine 0.46.    ASSESSMENT:  Mr. Villa is asymptomatic on present medical therapy.  He has moderate aortic stenosis and a repeat echo would be reasonable at this time to assess for progression of disease.    I congratulated the patient on his weight loss and encouraged him to continue with his  diet and exercise program.  He will remain on guideline-directed medical therapy for coronary artery disease and hypertension.    RECOMMENDATIONS:    1.  Continue Mediterranean-style weight loss diet.  2.  Regular exercise program.  3.  Echocardiogram to reassess the aortic valve.  4.  Follow up with me in about 1 year.    We greatly appreciate helping your patient, Mr. Travis Mike.    cc:   MD Ambrocio Aranda MD        D: 10/11/2021   T: 10/11/2021   MT:     Name:     TRAVIS MIKE  MRN:      [id number]        Account:      [id number]   :      1945           Service Date: 10/11/2021       Document: [id number]

## 2021-10-25 ENCOUNTER — HOSPITAL ENCOUNTER (OUTPATIENT)
Dept: CARDIOLOGY | Facility: CLINIC | Age: 76
Discharge: HOME | End: 2021-10-25
Attending: INTERNAL MEDICINE | Admitting: INTERNAL MEDICINE
Payer: COMMERCIAL

## 2021-10-25 DIAGNOSIS — I25.10 CORONARY ARTERY DISEASE INVOLVING NATIVE CORONARY ARTERY OF NATIVE HEART WITHOUT ANGINA PECTORIS: ICD-10-CM

## 2021-10-25 DIAGNOSIS — I35.0 NONRHEUMATIC AORTIC VALVE STENOSIS: ICD-10-CM

## 2021-10-25 DIAGNOSIS — I10 ESSENTIAL HYPERTENSION, BENIGN: ICD-10-CM

## 2021-10-25 DIAGNOSIS — I10 BENIGN ESSENTIAL HYPERTENSION: ICD-10-CM

## 2021-10-25 LAB — LVEF ECHO: NORMAL

## 2021-10-25 PROCEDURE — 93306 TTE W/DOPPLER COMPLETE: CPT | Mod: 26 | Performed by: INTERNAL MEDICINE

## 2021-10-25 PROCEDURE — 93306 TTE W/DOPPLER COMPLETE: CPT

## 2021-10-26 DIAGNOSIS — R07.9 CHEST PAIN, UNSPECIFIED TYPE: ICD-10-CM

## 2021-10-26 DIAGNOSIS — I10 ESSENTIAL HYPERTENSION, BENIGN: ICD-10-CM

## 2021-10-26 DIAGNOSIS — I25.10 CORONARY ARTERY DISEASE INVOLVING NATIVE CORONARY ARTERY OF NATIVE HEART WITHOUT ANGINA PECTORIS: ICD-10-CM

## 2021-10-26 DIAGNOSIS — I10 BENIGN ESSENTIAL HYPERTENSION: Primary | ICD-10-CM

## 2021-10-26 RX ORDER — RAMIPRIL 10 MG/1
10 CAPSULE ORAL 2 TIMES DAILY
Qty: 180 CAPSULE | Refills: 3 | Status: SHIPPED | OUTPATIENT
Start: 2021-10-26 | End: 2022-12-06

## 2021-10-26 RX ORDER — AMLODIPINE BESYLATE 2.5 MG/1
2.5 TABLET ORAL 2 TIMES DAILY
Qty: 180 TABLET | Refills: 3 | Status: SHIPPED | OUTPATIENT
Start: 2021-10-26 | End: 2022-08-11

## 2021-10-26 RX ORDER — CARVEDILOL 25 MG/1
25 TABLET ORAL 2 TIMES DAILY WITH MEALS
Qty: 180 TABLET | Refills: 3 | Status: SHIPPED | OUTPATIENT
Start: 2021-10-26 | End: 2022-12-06

## 2021-10-26 RX ORDER — INDAPAMIDE 1.25 MG/1
1.25 TABLET ORAL EVERY MORNING
Qty: 90 TABLET | Refills: 3 | Status: SHIPPED | OUTPATIENT
Start: 2021-10-26 | End: 2022-09-22

## 2021-10-26 NOTE — TELEPHONE ENCOUNTER
Received refill request for:  Amlodipine, Carvedilol, Indapamide, ramipril.   Last OV was: 10/11/21 OV w/    Labs/EK21 BMP  F/U scheduled: Orders for follow up 10/2022.   New script sent to: [facility]     Clarified pt is taking Amlodipine 2.5 mg BID not 5 mg once a day. Sent in new script for this. JODIE Huffman

## 2022-08-11 DIAGNOSIS — R07.9 CHEST PAIN, UNSPECIFIED TYPE: ICD-10-CM

## 2022-08-11 DIAGNOSIS — I10 ESSENTIAL HYPERTENSION, BENIGN: ICD-10-CM

## 2022-08-11 DIAGNOSIS — I25.10 CORONARY ARTERY DISEASE INVOLVING NATIVE CORONARY ARTERY OF NATIVE HEART WITHOUT ANGINA PECTORIS: ICD-10-CM

## 2022-08-11 RX ORDER — AMLODIPINE BESYLATE 2.5 MG/1
2.5 TABLET ORAL 2 TIMES DAILY
Qty: 180 TABLET | Refills: 3 | Status: ON HOLD | OUTPATIENT
Start: 2022-08-11 | End: 2023-02-18

## 2022-08-11 NOTE — PROGRESS NOTES
[facility] Refill Guideline reviewed.  Medication meets criteria for refill. LION Arellano RN

## 2022-09-22 DIAGNOSIS — I10 ESSENTIAL HYPERTENSION, BENIGN: ICD-10-CM

## 2022-09-22 DIAGNOSIS — I25.10 CORONARY ARTERY DISEASE INVOLVING NATIVE CORONARY ARTERY OF NATIVE HEART WITHOUT ANGINA PECTORIS: ICD-10-CM

## 2022-09-22 DIAGNOSIS — R07.9 CHEST PAIN, UNSPECIFIED TYPE: ICD-10-CM

## 2022-09-22 RX ORDER — INDAPAMIDE 1.25 MG/1
1.25 TABLET ORAL EVERY MORNING
Qty: 90 TABLET | Refills: 0 | Status: ON HOLD | OUTPATIENT
Start: 2022-09-22 | End: 2023-02-18

## 2022-09-22 NOTE — TELEPHONE ENCOUNTER
Received refill request for:  Indapamide 1.25mg  Last OV was: 10/11/21  Labs/EKG: BMP - 11/11/21  F/U scheduled: Needs to be scheduled, orders for 10/22  Pharmacy: [facility] Refill Guideline reviewed.  Medication meets criteria for refill.    Tamiko Park RN  09/22/22 at 10:50 AM

## 2022-09-22 NOTE — LETTER
September 22, 2022       TO: Travis Villa  [address] [address]       Dear Travis Villa,    We recently received a call from your pharmacy requesting a refill of your medication(s).    Our records indicate that you are due for follow-up with your Heart Care Provider. We will refill your medications for 3 months which will allow you enough time to be seen.    Please call [phone] to schedule your appointment.    Thank you for allowing [facility] to be a part of your health care team and we look forward to seeing you soon.    Thank you,    [facility]

## 2022-11-14 ENCOUNTER — TELEPHONE (OUTPATIENT)
Dept: CARDIOLOGY | Facility: CLINIC | Age: 77
End: 2022-11-14

## 2022-11-14 DIAGNOSIS — I35.0 MODERATE AORTIC STENOSIS: ICD-10-CM

## 2022-11-14 DIAGNOSIS — I35.0 NONRHEUMATIC AORTIC VALVE STENOSIS: ICD-10-CM

## 2022-11-14 DIAGNOSIS — I25.10 CORONARY ARTERY DISEASE INVOLVING NATIVE CORONARY ARTERY OF NATIVE HEART WITHOUT ANGINA PECTORIS: Primary | ICD-10-CM

## 2022-11-14 NOTE — TELEPHONE ENCOUNTER
Called patient to discuss his shortness of breath more. Pt states he feels SOB when going upstairs. This has been getting a little worse recently.  Pt has dependent edema and no worsening noted.      BPs run around 130/145 systolic and 70s diastolic.     Hx of moderate aortic stenosis     Pt requesting an Echocardiogram because his PMD suggested he have one done with us. Pt is due for annual visit. Routing to .     Nathanael, RN

## 2022-11-14 NOTE — TELEPHONE ENCOUNTER
[facility] Call Center    Phone Message    May a detailed message be left on voicemail: yes     Reason for Call: Other: Patient called in and stated that he has become winded lately. Next available appt in Feb. 2023.  He would also like orders for an Echo.  Please reach out to patient when those orders are available.     Action Taken: Other: Cardio    Travel Screening: Not Applicable

## 2022-11-21 ENCOUNTER — HOSPITAL ENCOUNTER (OUTPATIENT)
Dept: CARDIOLOGY | Facility: CLINIC | Age: 77
Discharge: HOME | End: 2022-11-21
Attending: INTERNAL MEDICINE | Admitting: INTERNAL MEDICINE
Payer: COMMERCIAL

## 2022-11-21 DIAGNOSIS — I25.10 CORONARY ARTERY DISEASE INVOLVING NATIVE CORONARY ARTERY OF NATIVE HEART WITHOUT ANGINA PECTORIS: ICD-10-CM

## 2022-11-21 DIAGNOSIS — I35.0 NONRHEUMATIC AORTIC VALVE STENOSIS: ICD-10-CM

## 2022-11-21 DIAGNOSIS — I35.0 MODERATE AORTIC STENOSIS: ICD-10-CM

## 2022-11-21 LAB — LVEF ECHO: NORMAL

## 2022-11-21 PROCEDURE — 93306 TTE W/DOPPLER COMPLETE: CPT

## 2022-11-21 PROCEDURE — 93306 TTE W/DOPPLER COMPLETE: CPT | Mod: 26 | Performed by: INTERNAL MEDICINE

## 2022-11-29 ENCOUNTER — OFFICE VISIT (OUTPATIENT)
Dept: CARDIOLOGY | Facility: CLINIC | Age: 77
End: 2022-11-29
Attending: INTERNAL MEDICINE
Payer: COMMERCIAL

## 2022-11-29 VITALS
OXYGEN SATURATION: 92 % | HEIGHT: 74 IN | WEIGHT: 255 LBS | BODY MASS INDEX: 32.73 KG/M2 | HEART RATE: 66 BPM | DIASTOLIC BLOOD PRESSURE: 80 MMHG | SYSTOLIC BLOOD PRESSURE: 134 MMHG

## 2022-11-29 DIAGNOSIS — I10 ESSENTIAL HYPERTENSION, BENIGN: ICD-10-CM

## 2022-11-29 DIAGNOSIS — I10 BENIGN ESSENTIAL HYPERTENSION: ICD-10-CM

## 2022-11-29 DIAGNOSIS — I35.0 NONRHEUMATIC AORTIC VALVE STENOSIS: ICD-10-CM

## 2022-11-29 DIAGNOSIS — I25.10 CORONARY ARTERY DISEASE INVOLVING NATIVE CORONARY ARTERY OF NATIVE HEART WITHOUT ANGINA PECTORIS: ICD-10-CM

## 2022-11-29 PROCEDURE — 99214 OFFICE O/P EST MOD 30 MIN: CPT | Performed by: INTERNAL MEDICINE

## 2022-11-29 NOTE — PROGRESS NOTES
Service Date: 11/29/2022    HISTORY OF PRESENT ILLNESS:  Travis Villa, a 77-year-old man with coronary artery disease, aortic stenosis, hypertension, type 2 diabetes mellitus, obesity, prostatic hypertrophy, chronic low back pain, and statin intolerance, was seen today at your request for follow-up.    Since last seen in 10/2021, Mr. Villa remains free of new cardiac complaints.  He specifically denies chest pain, syncope, lightheadedness, orthopnea, or PND.  Mr. Villa receives very attentive care from his internist and recently saw her for an episode of bronchitis characterized by wheezing and productive cough.  His symptoms have resolved entirely with doxycycline and low-dose prednisone.Mr. 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    An echocardiogram prior to his visit today showed that his aortic stenosis remains unchanged in the mild-to-moderate category with a normal ejection fraction, a valve area of 1.7 cm2 and a mean gradient of 22 mmHg.    PAST MEDICAL HISTORY:  1.  Mild to moderate aortic stenosis - echocardiogram 11/21/2022 showing ejection fraction of 60% with a valve area of 1.7 cm2 and mean gradient of 22 mmHg.  2.  Coronary artery disease.  a.  History of PCI of circumflex on right coronary in 2008.His last regadenoson nuclear stress test in 2020 showed no ischemia with a normal ejection fraction  3.  Obesity.  4.  Hypertension.  5.  Dyslipidemia.  6.  Type 2 diabetes mellitus.  7.  Osteoarthritis - chronic low back pain.    PHYSICAL EXAMINATION:    GENERAL:  Exam today demonstrates a very pleasant, cooperative and intelligent 77-year-old man. He is overweight.  VITAL SIGNS:  His blood pressure is 134/80, his heart rate is 66.  His height is 1.9 meters, his weight is 115.7 kg, with a BMI of 32.7.  RESPIRATORY:  His lungs are clear to percussion and auscultation.  CARDIOVASCULAR:  Shows a normal S1 with a grade 2/6  systolic ejection murmur of the aortic region radiating to the carotids.  There is no change in his murmur since his last exam.  His pulses are full and symmetrical with no pulsus parvus et tardus.    MEDICATIONS:  1.  Amlodipine 5 mg daily.  2.  Aspirin 81 daily.  3.  Carvedilol 25 mg b.i.d.  4.  Indapamide 1.25 mg daily.  5.  Niacin.  6.  Ramipril 10 mg b.i.d.    LABORATORY STUDIES:  His echo report is in the above text.  His ejection fraction is 60%, LV chamber size normal.  There is mild to moderate aortic stenosis with a mean gradient of 22.  .    ASSESSMENT:  Mr. Mike is stable at this time with good blood pressure control and remains free of symptoms related to aortic valve disease or coronary artery disease.  I again stressed the benefits of a Mediterranean-style weight loss diet and a regular exercise program.  He has been advised to contact our office with any symptoms of chest discomfort, syncope or lightheadedness.    RECOMMENDATIONS:  1.  Continue present medical therapy.  2.  Mediterranean-style weight loss diet.  3.  Follow up with me in about 1 year with an echo at that time.    We greatly appreciate the opportunity to care for your patient, Mr. Travis Mike.    Ambrocio Valdez MD    cc:  Marce Loving MD  [address]  [place] [address]    Ambrocio Valdez MD        D: 2022   T: 2022   MT: al    Name:     TRAVIS MIKE  MRN:      [id number]        Account:      [id number]   :      1945           Service Date: 2022       Document: [id number]

## 2022-11-29 NOTE — PROGRESS NOTES
HISTORY OF PRESENT ILLNESS:  Still remodeling his home.     Orders this Visit:  No orders of the defined types were placed in this encounter.    No orders of the defined types were placed in this encounter.    There are no discontinued medications.    Encounter Diagnoses   Name Primary?     Coronary artery disease involving native coronary artery of native heart without angina pectoris      Benign essential hypertension      Nonrheumatic aortic valve stenosis      Essential hypertension, benign        CURRENT MEDICATIONS:  Current Outpatient Medications   Medication Sig Dispense Refill     amLODIPine (NORVASC) 2.5 MG tablet Take 1 tablet (2.5 mg) by mouth 2 times daily 180 tablet 3     Ascorbic Acid (VITAMIN C PO) Take 1,000 mg by mouth daily        ASPIRIN PO Take 81 mg by mouth 2 times daily.       calcium-magnesium (CALMAG) 500-250 MG TABS Take 1 tablet by mouth At Bedtime        carvedilol (COREG) 25 MG tablet Take 1 tablet (25 mg) by mouth 2 times daily (with meals) 180 tablet 3     Cholecalciferol (VITAMIN D3 PO) Take 10,000 Units by mouth At Bedtime        Coenzyme Q-10 60 MG CAPS Take 60 mg by mouth At Bedtime 30 capsule 0     Flaxseed, Linseed, OIL Take 1,300 mg by mouth daily        FOLIC ACID PO Take 400 mcg by mouth At Bedtime        garlic 150 MG TABS Take by mouth At Bedtime        GLUCOSAMINE SULFATE PO Take 1,500 mg by mouth daily        indapamide (LOZOL) 1.25 MG tablet Take 1 tablet (1.25 mg) by mouth every morning 90 tablet 0     METFORMIN HCL PO Take 500 mg by mouth 2 times daily (with meals)        multivitamin w/minerals (THERA-VIT-M) tablet Take 1 tablet by mouth daily.       Niacin, Antihyperlipidemic, (NIASPAN PO) Take 500 mg by mouth 2 times daily        nitroGLYcerin (NITROSTAT) 0.4 MG sublingual tablet Place 1 tablet (0.4 mg) under the tongue every 5 minutes as needed for chest pain 25 tablet 3     Omega-3 Fatty Acids (OMEGA-3 FISH OIL PO) Take 2 g by mouth 2 times daily (with meals)     "    ramipril (ALTACE) 10 MG capsule Take 1 capsule (10 mg) by mouth 2 times daily 180 capsule 3     Resveratrol 100 MG CAPS Take 100 mg by mouth At Bedtime        Saw Palmetto, Serenoa repens, (SAW PALMETTO CONCENTRATE OR) Take 500 mg by mouth At Bedtime          ALLERGIES     Allergies   Allergen Reactions     Atorvastatin      Muscle aches       PAST MEDICAL, SURGICAL, FAMILY, SOCIAL HISTORY:  History was reviewed and updated as needed, see medical record.    Review of Systems:  A 12-point review of systems was completed, see medical record for detailed review of systems information.    Physical Exam:  Vitals: /80 (BP Location: Right arm, Patient Position: Sitting, Cuff Size: Adult Large)   Pulse 66   Ht 1.88 m (6' 2\")   Wt 115.7 kg (255 lb)   SpO2 92%   BMI 32.74 kg/m      Constitutional:           Skin:           Head:           Eyes:           ENT:           Neck:           Chest:           Cardiac:                    Abdomen:           Vascular:                                        Extremities and Back:           Neurological:           ASSESSMENT: stable        RECOMMENDATIONS: TTE in one year      Recent Lab Results:  LIPID RESULTS:  Lab Results   Component Value Date    CHOL 190 12/06/2017    HDL 32 12/06/2017     12/06/2017    TRIG 229 12/06/2017    CHOLHDLRATIO 3.9 03/13/2014       LIVER ENZYME RESULTS:  Lab Results   Component Value Date    AST 33 12/29/2012    ALT 6 03/13/2014       CBC RESULTS:  Lab Results   Component Value Date    WBC 7.4 02/27/2019    RBC 5.08 02/27/2019    HGB 14.3 02/27/2019    HCT 42.3 02/27/2019    MCV 83 02/27/2019    MCH 28.1 02/27/2019    MCHC 33.8 02/27/2019    RDW 14.3 02/27/2019     02/27/2019       BMP RESULTS:  Lab Results   Component Value Date     02/27/2019    POTASSIUM 4.0 02/27/2019    CHLORIDE 96 02/27/2019    CO2 30 02/27/2019    ANIONGAP 8 02/27/2019     (H) 02/27/2019    BUN 14 02/27/2019    CR 0.46 (L) 02/27/2019    " GFRESTIMATED >90 02/27/2019    GFRESTBLACK >90 02/27/2019    LEIGH ANN 8.9 02/27/2019        A1C RESULTS:  No results found for: A1C    INR RESULTS:  Lab Results   Component Value Date    INR 1.03 05/02/2008    INR 1.04 03/13/2008       We greatly appreciate the opportunity to be involved in the care of your patient, Travis Villa.    Sincerely,  Ambrocio Valdez MD      CC  Ambrocio Valdez MD  [address]  [place] [address]

## 2022-11-29 NOTE — LETTER
11/29/2022    Marce Loving MD  [facility] [address]  [facility] [address]    RE: Travis Villa       Dear Colleague,     I had the pleasure of seeing Travis Francoins in the [facility].  HISTORY OF PRESENT ILLNESS:  Still remodeling his home.     Orders this Visit:  No orders of the defined types were placed in this encounter.    No orders of the defined types were placed in this encounter.    There are no discontinued medications.    Encounter Diagnoses   Name Primary?     Coronary artery disease involving native coronary artery of native heart without angina pectoris      Benign essential hypertension      Nonrheumatic aortic valve stenosis      Essential hypertension, benign        CURRENT MEDICATIONS:  Current Outpatient Medications   Medication Sig Dispense Refill     amLODIPine (NORVASC) 2.5 MG tablet Take 1 tablet (2.5 mg) by mouth 2 times daily 180 tablet 3     Ascorbic Acid (VITAMIN C PO) Take 1,000 mg by mouth daily        ASPIRIN PO Take 81 mg by mouth 2 times daily.       calcium-magnesium (CALMAG) 500-250 MG TABS Take 1 tablet by mouth At Bedtime        carvedilol (COREG) 25 MG tablet Take 1 tablet (25 mg) by mouth 2 times daily (with meals) 180 tablet 3     Cholecalciferol (VITAMIN D3 PO) Take 10,000 Units by mouth At Bedtime        Coenzyme Q-10 60 MG CAPS Take 60 mg by mouth At Bedtime 30 capsule 0     Flaxseed, Linseed, OIL Take 1,300 mg by mouth daily        FOLIC ACID PO Take 400 mcg by mouth At Bedtime        garlic 150 MG TABS Take by mouth At Bedtime        GLUCOSAMINE SULFATE PO Take 1,500 mg by mouth daily        indapamide (LOZOL) 1.25 MG tablet Take 1 tablet (1.25 mg) by mouth every morning 90 tablet 0     METFORMIN HCL PO Take 500 mg by mouth 2 times daily (with meals)        multivitamin w/minerals (THERA-VIT-M) tablet Take 1 tablet by mouth daily.       Niacin, Antihyperlipidemic, (NIASPAN PO) Take 500 mg by mouth 2 times daily         "nitroGLYcerin (NITROSTAT) 0.4 MG sublingual tablet Place 1 tablet (0.4 mg) under the tongue every 5 minutes as needed for chest pain 25 tablet 3     Omega-3 Fatty Acids (OMEGA-3 FISH OIL PO) Take 2 g by mouth 2 times daily (with meals)        ramipril (ALTACE) 10 MG capsule Take 1 capsule (10 mg) by mouth 2 times daily 180 capsule 3     Resveratrol 100 MG CAPS Take 100 mg by mouth At Bedtime        Saw Palmetto, Serenoa repens, (SAW PALMETTO CONCENTRATE OR) Take 500 mg by mouth At Bedtime          ALLERGIES     Allergies   Allergen Reactions     Atorvastatin      Muscle aches       PAST MEDICAL, SURGICAL, FAMILY, SOCIAL HISTORY:  History was reviewed and updated as needed, see medical record.    Review of Systems:  A 12-point review of systems was completed, see medical record for detailed review of systems information.    Physical Exam:  Vitals: /80 (BP Location: Right arm, Patient Position: Sitting, Cuff Size: Adult Large)   Pulse 66   Ht 1.88 m (6' 2\")   Wt 115.7 kg (255 lb)   SpO2 92%   BMI 32.74 kg/m      Constitutional:           Skin:           Head:           Eyes:           ENT:           Neck:           Chest:           Cardiac:                    Abdomen:           Vascular:                                        Extremities and Back:           Neurological:           ASSESSMENT: stable        RECOMMENDATIONS: TTE in one year      Recent Lab Results:  LIPID RESULTS:  Lab Results   Component Value Date    CHOL 190 12/06/2017    HDL 32 12/06/2017     12/06/2017    TRIG 229 12/06/2017    CHOLHDLRATIO 3.9 03/13/2014       LIVER ENZYME RESULTS:  Lab Results   Component Value Date    AST 33 12/29/2012    ALT 6 03/13/2014       CBC RESULTS:  Lab Results   Component Value Date    WBC 7.4 02/27/2019    RBC 5.08 02/27/2019    HGB 14.3 02/27/2019    HCT 42.3 02/27/2019    MCV 83 02/27/2019    MCH 28.1 02/27/2019    MCHC 33.8 02/27/2019    RDW 14.3 02/27/2019     02/27/2019       BMP " RESULTS:  Lab Results   Component Value Date     02/27/2019    POTASSIUM 4.0 02/27/2019    CHLORIDE 96 02/27/2019    CO2 30 02/27/2019    ANIONGAP 8 02/27/2019     (H) 02/27/2019    BUN 14 02/27/2019    CR 0.46 (L) 02/27/2019    GFRESTIMATED >90 02/27/2019    GFRESTBLACK >90 02/27/2019    LEIGH ANN 8.9 02/27/2019        A1C RESULTS:  No results found for: A1C    INR RESULTS:  Lab Results   Component Value Date    INR 1.03 05/02/2008    INR 1.04 03/13/2008       We greatly appreciate the opportunity to be involved in the care of your patient, Travis Villa.    Sincerely,  Ambrocio Valdez MD      CC  Ambrocio Valdez MD  [address] [place] [id number]                                                                       Service Date: 11/29/2022    HISTORY OF PRESENT ILLNESS:  Travis Villa, a 77-year-old man with coronary artery disease, aortic stenosis, hypertension, type 2 diabetes mellitus, obesity, prostatic hypertrophy, chronic low back pain, and statin intolerance, was seen today at your request for follow-up.    Since last seen in 10/2021, Mr. Villa remains free of new cardiac complaints.  He specifically denies chest pain, syncope, lightheadedness, orthopnea, or PND.  Mr. Villa receives very attentive care from his internist and recently saw her for an episode of bronchitis characterized by wheezing and productive cough.  His symptoms have resolved entirely with doxycycline and low-dose prednisone.Mr. 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    An echocardiogram prior to his visit today showed that his aortic stenosis remains unchanged in the mild-to-moderate category with a normal ejection fraction, a valve area of 1.7 cm2 and a mean gradient of 22 mmHg.    PAST MEDICAL HISTORY:  1.  Mild to moderate aortic stenosis - echocardiogram 11/21/2022 showing ejection fraction of 60% with a valve area of  1.7 cm2 and mean gradient of 22 mmHg.  2.  Coronary artery disease.  a.  History of PCI of circumflex on right coronary in 2008.His last regadenoson nuclear stress test in 2020 showed no ischemia with a normal ejection fraction  3.  Obesity.  4.  Hypertension.  5.  Dyslipidemia.  6.  Type 2 diabetes mellitus.  7.  Osteoarthritis - chronic low back pain.    PHYSICAL EXAMINATION:    GENERAL:  Exam today demonstrates a very pleasant, cooperative and intelligent 77-year-old man. He is overweight.  VITAL SIGNS:  His blood pressure is 134/80, his heart rate is 66.  His height is 1.9 meters, his weight is 115.7 kg, with a BMI of 32.7.  RESPIRATORY:  His lungs are clear to percussion and auscultation.  CARDIOVASCULAR:  Shows a normal S1 with a grade 2/6 systolic ejection murmur of the aortic region radiating to the carotids.  There is no change in his murmur since his last exam.  His pulses are full and symmetrical with no pulsus parvus et tardus.    MEDICATIONS:  1.  Amlodipine 5 mg daily.  2.  Aspirin 81 daily.  3.  Carvedilol 25 mg b.i.d.  4.  Indapamide 1.25 mg daily.  5.  Niacin.  6.  Ramipril 10 mg b.i.d.    LABORATORY STUDIES:  His echo report is in the above text.  His ejection fraction is 60%, LV chamber size normal.  There is mild to moderate aortic stenosis with a mean gradient of 22.  .    ASSESSMENT:  Mr. Villa is stable at this time with good blood pressure control and remains free of symptoms related to aortic valve disease or coronary artery disease.  I again stressed the benefits of a Mediterranean-style weight loss diet and a regular exercise program.  He has been advised to contact our office with any symptoms of chest discomfort, syncope or lightheadedness.    RECOMMENDATIONS:  1.  Continue present medical therapy.  2.  Mediterranean-style weight loss diet.  3.  Follow up with me in about 1 year with an echo at that time.    We greatly appreciate the opportunity to care for your patient, Mr. Robles  Daisy.    Ambrocio Valdez MD    cc:  Marce Loving MD  [facility]  [address]  [place] [address]    Ambrocio Valdez MD        D: 2022   T: 2022   MT: al    Name:     CATRACHO MIKE  MRN:      [id number]        Account:      [id number]   :      1945           Service Date: 2022       Document: [id number]      Thank you for allowing me to participate in the care of your patient.      Sincerely,     Ambrocio Valdez MD     [facility]  cc:   Ambrocio Valdez MD  [address]  [place] [id number]

## 2022-12-06 DIAGNOSIS — I10 ESSENTIAL HYPERTENSION, BENIGN: ICD-10-CM

## 2022-12-06 DIAGNOSIS — R07.9 CHEST PAIN, UNSPECIFIED TYPE: ICD-10-CM

## 2022-12-06 DIAGNOSIS — I25.10 CORONARY ARTERY DISEASE INVOLVING NATIVE CORONARY ARTERY OF NATIVE HEART WITHOUT ANGINA PECTORIS: ICD-10-CM

## 2022-12-06 RX ORDER — RAMIPRIL 10 MG/1
10 CAPSULE ORAL 2 TIMES DAILY
Qty: 180 CAPSULE | Refills: 3 | Status: SHIPPED | OUTPATIENT
Start: 2022-12-06 | End: 2023-12-14

## 2022-12-06 RX ORDER — CARVEDILOL 25 MG/1
25 TABLET ORAL 2 TIMES DAILY WITH MEALS
Qty: 180 TABLET | Refills: 3 | Status: SHIPPED | OUTPATIENT
Start: 2022-12-06 | End: 2024-01-16

## 2022-12-06 NOTE — TELEPHONE ENCOUNTER
Received refill request for:  Carvedilol, Ramipril    [facility]iology Refill Guideline reviewed.  Medication meets criteria for refill.    Eliana Rojas RN, BSN  12/06/22 at 7:28 AM

## 2023-01-19 ENCOUNTER — MEDICAL CORRESPONDENCE (OUTPATIENT)
Dept: HEALTH INFORMATION MANAGEMENT | Facility: CLINIC | Age: 78
End: 2023-01-19

## 2023-01-24 ENCOUNTER — HOSPITAL ENCOUNTER (OUTPATIENT)
Dept: CT IMAGING | Facility: CLINIC | Age: 78
Discharge: HOME | End: 2023-01-24
Attending: COLON & RECTAL SURGERY
Payer: COMMERCIAL

## 2023-01-24 ENCOUNTER — LAB (OUTPATIENT)
Dept: LAB | Facility: CLINIC | Age: 78
End: 2023-01-24
Attending: COLON & RECTAL SURGERY
Payer: COMMERCIAL

## 2023-01-24 DIAGNOSIS — D12.2 ADENOMATOUS POLYP OF ASCENDING COLON: ICD-10-CM

## 2023-01-24 DIAGNOSIS — K63.9 LESION OF COLON: ICD-10-CM

## 2023-01-24 LAB
CEA SERPL-MCNC: 0.9 NG/ML
CREAT BLD-MCNC: 0.5 MG/DL (ref 0.7–1.3)
GFR SERPL CREATININE-BSD FRML MDRD: >60 ML/MIN/1.73M2

## 2023-01-24 PROCEDURE — 82378 CARCINOEMBRYONIC ANTIGEN: CPT | Mod: GA

## 2023-01-24 PROCEDURE — 74177 CT ABD & PELVIS W/CONTRAST: CPT

## 2023-01-24 PROCEDURE — 82565 ASSAY OF CREATININE: CPT

## 2023-01-24 PROCEDURE — 250N000009 HC RX 250: Performed by: COLON & RECTAL SURGERY

## 2023-01-24 PROCEDURE — 250N000011 HC RX IP 250 OP 636: Performed by: COLON & RECTAL SURGERY

## 2023-01-24 PROCEDURE — 36415 COLL VENOUS BLD VENIPUNCTURE: CPT | Mod: GA

## 2023-01-24 RX ORDER — IOPAMIDOL 755 MG/ML
129 INJECTION, SOLUTION INTRAVASCULAR ONCE
Status: COMPLETED | OUTPATIENT
Start: 2023-01-24 | End: 2023-01-24

## 2023-01-24 RX ADMIN — IOPAMIDOL 129 ML: 755 INJECTION, SOLUTION INTRAVENOUS at 14:34

## 2023-01-24 RX ADMIN — SODIUM CHLORIDE 79 ML: 900 INJECTION INTRAVENOUS at 14:34

## 2023-02-15 ENCOUNTER — ANESTHESIA EVENT (OUTPATIENT)
Dept: SURGERY | Facility: CLINIC | Age: 78
DRG: 330 | End: 2023-02-15
Payer: COMMERCIAL

## 2023-02-15 RX ORDER — TAMSULOSIN HYDROCHLORIDE 0.4 MG/1
0.4 CAPSULE ORAL EVERY EVENING
COMMUNITY

## 2023-02-15 RX ORDER — OXYCODONE AND ACETAMINOPHEN 5; 325 MG/1; MG/1
1 TABLET ORAL EVERY 6 HOURS PRN
COMMUNITY

## 2023-02-15 RX ORDER — NAPROXEN SODIUM 220 MG
220-440 TABLET ORAL PRN
COMMUNITY

## 2023-02-15 RX ORDER — ALBUTEROL SULFATE 90 UG/1
2 AEROSOL, METERED RESPIRATORY (INHALATION) EVERY 6 HOURS PRN
COMMUNITY

## 2023-02-15 RX ORDER — ASPIRIN 81 MG/1
81 TABLET ORAL 2 TIMES DAILY
Status: ON HOLD | COMMUNITY
End: 2023-02-17

## 2023-02-15 NOTE — PROGRESS NOTES
PTA medications updated by Medication Scribe prior to surgery via phone call with patient (last doses completed by Nurse)     Medication history sources: Patient, Surescripts and H&P  In the past week, patient estimated taking medication this percent of the time: Greater than 90%  Adherence assessment: N/A Not Observed    Significant changes made to the medication list:  Patient reports no longer taking the following meds (med scribe removed from PTA med list): Resveratrol, Niaspan, Folic Acid      Additional medication history information:   None    Medication reconciliation completed by provider prior to medication history? No    Time spent in this activity: 40 minutes    The information provided in this note is only as accurate as the sources available at the time of update(s)    Prior to Admission medications    Medication Sig Last Dose Taking? Auth Provider Long Term End Date   albuterol (PROAIR HFA/PROVENTIL HFA/VENTOLIN HFA) 108 (90 Base) MCG/ACT inhaler Inhale 2 puffs into the lungs every 6 hours as needed for shortness of breath, wheezing or cough Unknown at prn Yes Reported, Patient     amLODIPine (NORVASC) 2.5 MG tablet Take 1 tablet (2.5 mg) by mouth 2 times daily 2/15/2023 at pm Yes Ambrocio Valdez MD Yes    aspirin 81 MG EC tablet Take 81 mg by mouth 2 times daily 2/15/2023 at pm Yes Reported, Patient     carvedilol (COREG) 25 MG tablet Take 1 tablet (25 mg) by mouth 2 times daily (with meals)  at am Yes Ambrocio Valdez MD Yes    Cholecalciferol (VITAMIN D3 PO) Take 1 capsule by mouth At Bedtime 2/12/2023 at pm Yes Unknown, Entered By History     diclofenac (VOLTAREN) 1 % topical gel Apply topically as needed for moderate pain (4-6) Unknown at prn Yes Reported, Patient     fish oil-omega-3 fatty acids 1000 MG capsule Take 1 capsule by mouth 2 times daily (with meals) 2/12/2023 at pm Yes Unknown, Entered By History     Flaxseed, Linseed, OIL Take 1 tablet by mouth daily 2/12/2023 at am Yes  Unknown, Entered By History     GLUCOSAMINE SULFATE PO Take 1 tablet by mouth daily 2/12/2023 at am Yes Unknown, Entered By History     indapamide (LOZOL) 1.25 MG tablet Take 1 tablet (1.25 mg) by mouth every morning 2/15/2023 at am Yes Ambrocio Valdez MD Yes    metFORMIN (GLUCOPHAGE) 500 MG tablet Take 500 mg by mouth 2 times daily (with meals)  2/15/2023 at pm Yes Reported, Patient     multivitamin w/minerals (THERA-VIT-M) tablet Take 1 tablet by mouth daily. 2/12/2023 at am Yes Unknown, Entered By History     naproxen sodium (ANAPROX) 220 MG tablet Take 220-440 mg by mouth as needed for moderate pain (4-6) Unknown at prn Yes Reported, Patient     nitroGLYcerin (NITROSTAT) 0.4 MG sublingual tablet Place 1 tablet (0.4 mg) under the tongue every 5 minutes as needed for chest pain Unknown at prn Yes Ambrocio Valdez MD Yes    oxyCODONE-acetaminophen (PERCOCET) 5-325 MG tablet Take 1 tablet by mouth every 6 hours as needed for severe pain (7-10) Unknown at prn Yes Reported, Patient     ramipril (ALTACE) 10 MG capsule Take 1 capsule (10 mg) by mouth 2 times daily 2/15/2023 at pm Yes Ambrocio Valdez MD Yes    tamsulosin (FLOMAX) 0.4 MG capsule Take 0.4 mg by mouth every evening 2/15/2023 at pm Yes Reported, Patient       Medication history completed by:    Ronald Castillo CPhT  Medication [facility]

## 2023-02-15 NOTE — ANESTHESIA PREPROCEDURE EVALUATION
Anesthesia Pre-Procedure Evaluation    Patient: Travis Villa   MRN: [id number] : 1945        Procedure : Procedure(s):  Laparoscopic Right Hemicolectomy          Past Medical History:   Diagnosis Date     Adenomatous colon polyp      Aortic stenosis      Benign essential HTN      Benign prostatic hyperplasia      BPH (benign prostatic hyperplasia)      Chronic narcotic use      Coronary artery disease involving native coronary artery of native heart      DDD (degenerative disc disease), cervical      DDD (degenerative disc disease), lumbar      Diabetes mellitus (H)      Elevated prostate specific antigen (PSA)      GERD (gastroesophageal reflux disease)      Indwelling Marrufo catheter present     for BPH     Mixed hyperlipidemia      Morbid obesity (H)      Myocardial infarction (H)      Obesity      Osteoarthritis      Squamous cell carcinoma in situ of skin of face      Synovial cyst of right popliteal space      Umbilical hernia with obstruction      Unspecified disorder of metabolism      Vitamin D deficiency       Past Surgical History:   Procedure Laterality Date     HEART CATH, ANGIOPLASTY  2008     PCI RCA,Circumflex rotational atherectomy, and ARETHA placement      Allergies   Allergen Reactions     Atorvastatin      Muscle aches      Social History     Tobacco Use     Smoking status: Never     Smokeless tobacco: Never   Substance Use Topics     Alcohol use: No     Comment: occasional      Wt Readings from Last 1 Encounters:   22 115.7 kg (255 lb)        Anesthesia Evaluation   Pt has had prior anesthetic.     No history of anesthetic complications       ROS/MED HX  ENT/Pulmonary:    (-) sleep apnea   Neurologic:       Cardiovascular:     (+) Dyslipidemia hypertension-range: controlled/ -CAD --stent-. valvular problems/murmurs mild AS. Previous cardiac testing   Echo: Date: 2022 Results:  Interpretation Summary     Mild valvular aortic stenosis, mean gradient 22 mmHg, valve area  1.7 cm2.  There is mild to moderate concentric left ventricular hypertrophy.  The visual ejection fraction is 55-60%.  No regional wall motion abnormalities noted.  Stress Test: Date: 2021 Results:  Negative ischemia  ECG Reviewed: Date: Results:    Cath: Date: Results:   (-) angina and angina   METS/Exercise Tolerance:     Hematologic:       Musculoskeletal:       GI/Hepatic:    (-) GERD (pt denies)   Renal/Genitourinary:       Endo:     (+) type II DM (metformin), Not using insulin, Obesity (BMI 33),     Psychiatric/Substance Use:     (+) H/O chronic opiod use  (percocet occasionally, maybe 1-2x per week).     Infectious Disease:       Malignancy: Comment: Colon polyp      Other:      (+) , H/O Chronic Pain,        Physical Exam    Airway        Mallampati: II   TM distance: > 3 FB   Neck ROM: full   Mouth opening: > 3 cm    Respiratory Devices and Support         Dental       (+) Modest Abnormalities - crowns, retainers, 1 or 2 missing teeth      Cardiovascular   cardiovascular exam normal          Pulmonary   pulmonary exam normal                OUTSIDE LABS:  CBC:   Lab Results   Component Value Date    WBC 7.4 02/27/2019    WBC 5.4 12/29/2012    HGB 14.3 02/27/2019    HGB 14.8 12/29/2012    HCT 42.3 02/27/2019    HCT 44.4 12/29/2012     02/27/2019     12/29/2012     BMP:   Lab Results   Component Value Date     02/27/2019     12/06/2017    POTASSIUM 4.0 02/27/2019    POTASSIUM 3.9 12/06/2017    CHLORIDE 96 02/27/2019    CHLORIDE 98 12/06/2017    CO2 30 02/27/2019    CO2 31 12/06/2017    BUN 14 02/27/2019    BUN 12 12/06/2017    CR 0.5 (L) 01/24/2023    CR 0.46 (L) 02/27/2019     (H) 02/27/2019     (A) 12/06/2017     COAGS:   Lab Results   Component Value Date    PTT 34 05/02/2008    INR 1.03 05/02/2008     POC:   Lab Results   Component Value Date     (H) 02/28/2019     HEPATIC:   Lab Results   Component Value Date    ALBUMIN 4.2 12/29/2012    PROTTOTAL 7.0  12/29/2012    ALT 6 03/13/2014    AST 33 12/29/2012    ALKPHOS 50 12/29/2012    BILITOTAL 0.7 12/29/2012     OTHER:   Lab Results   Component Value Date    LEIGH ANN 8.9 02/27/2019    TSH 3.09 12/06/2017       Anesthesia Plan    ASA Status:  3   NPO Status:  NPO Appropriate    Anesthesia Type: General.     - Airway: ETT   Induction: Intravenous.   Maintenance: Balanced.   Techniques and Equipment:     - Airway: Video-Laryngoscope         Consents    Anesthesia Plan(s) and associated risks, benefits, and realistic alternatives discussed. Questions answered and patient/representative(s) expressed understanding.    - Discussed:     - Discussed with:  Patient         Postoperative Care    Pain management: IV analgesics, Multi-modal analgesia.   PONV prophylaxis: Ondansetron (or other 5HT-3), Background Propofol Infusion     Comments:    Other Comments: H&P reviewed    Videoscope  Toradol 15mg if ok with surgeon  Avoid decadron            Nj Casillas MD

## 2023-02-16 ENCOUNTER — HOSPITAL ENCOUNTER (INPATIENT)
Facility: CLINIC | Age: 78
LOS: 3 days | Discharge: HOME | DRG: 330 | End: 2023-02-19
Attending: COLON & RECTAL SURGERY | Admitting: COLON & RECTAL SURGERY
Payer: COMMERCIAL

## 2023-02-16 ENCOUNTER — ANESTHESIA (OUTPATIENT)
Dept: SURGERY | Facility: CLINIC | Age: 78
DRG: 330 | End: 2023-02-16
Payer: COMMERCIAL

## 2023-02-16 DIAGNOSIS — D12.6 ADENOMATOUS POLYP OF COLON, UNSPECIFIED PART OF COLON: Primary | ICD-10-CM

## 2023-02-16 LAB
CREAT SERPL-MCNC: 0.57 MG/DL (ref 0.67–1.17)
GFR SERPL CREATININE-BSD FRML MDRD: >90 ML/MIN/1.73M2
GLUCOSE BLDC GLUCOMTR-MCNC: 141 MG/DL (ref 70–99)
GLUCOSE BLDC GLUCOMTR-MCNC: 181 MG/DL (ref 70–99)
GLUCOSE BLDC GLUCOMTR-MCNC: 214 MG/DL (ref 70–99)
GLUCOSE BLDC GLUCOMTR-MCNC: 223 MG/DL (ref 70–99)
GLUCOSE SERPL-MCNC: 166 MG/DL (ref 70–99)
HOLD SPECIMEN: NORMAL
HOLD SPECIMEN: NORMAL
LACTATE SERPL-SCNC: 0.8 MMOL/L (ref 0.7–2)
POTASSIUM SERPL-SCNC: 3.5 MMOL/L (ref 3.4–5.3)

## 2023-02-16 PROCEDURE — 258N000003 HC RX IP 258 OP 636: Performed by: ANESTHESIOLOGY

## 2023-02-16 PROCEDURE — 88305 TISSUE EXAM BY PATHOLOGIST: CPT | Mod: TC | Performed by: COLON & RECTAL SURGERY

## 2023-02-16 PROCEDURE — 0WQF4ZZ REPAIR ABDOMINAL WALL, PERCUTANEOUS ENDOSCOPIC APPROACH: ICD-10-PCS | Performed by: COLON & RECTAL SURGERY

## 2023-02-16 PROCEDURE — 360N000077 HC SURGERY LEVEL 4, PER MIN: Performed by: COLON & RECTAL SURGERY

## 2023-02-16 PROCEDURE — 0DTF4ZZ RESECTION OF RIGHT LARGE INTESTINE, PERCUTANEOUS ENDOSCOPIC APPROACH: ICD-10-PCS | Performed by: COLON & RECTAL SURGERY

## 2023-02-16 PROCEDURE — 0DTJ4ZZ RESECTION OF APPENDIX, PERCUTANEOUS ENDOSCOPIC APPROACH: ICD-10-PCS | Performed by: COLON & RECTAL SURGERY

## 2023-02-16 PROCEDURE — 250N000012 HC RX MED GY IP 250 OP 636 PS 637: Performed by: ANESTHESIOLOGY

## 2023-02-16 PROCEDURE — 250N000011 HC RX IP 250 OP 636: Performed by: NURSE ANESTHETIST, CERTIFIED REGISTERED

## 2023-02-16 PROCEDURE — 710N000009 HC RECOVERY PHASE 1, LEVEL 1, PER MIN: Performed by: COLON & RECTAL SURGERY

## 2023-02-16 PROCEDURE — 258N000003 HC RX IP 258 OP 636: Performed by: NURSE ANESTHETIST, CERTIFIED REGISTERED

## 2023-02-16 PROCEDURE — 82565 ASSAY OF CREATININE: CPT | Performed by: COLON & RECTAL SURGERY

## 2023-02-16 PROCEDURE — 272N000001 HC OR GENERAL SUPPLY STERILE: Performed by: COLON & RECTAL SURGERY

## 2023-02-16 PROCEDURE — 258N000003 HC RX IP 258 OP 636: Performed by: COLON & RECTAL SURGERY

## 2023-02-16 PROCEDURE — 36415 COLL VENOUS BLD VENIPUNCTURE: CPT | Performed by: PHYSICIAN ASSISTANT

## 2023-02-16 PROCEDURE — 250N000013 HC RX MED GY IP 250 OP 250 PS 637: Performed by: PHYSICIAN ASSISTANT

## 2023-02-16 PROCEDURE — 120N000001 HC R&B MED SURG/OB

## 2023-02-16 PROCEDURE — 999N000141 HC STATISTIC PRE-PROCEDURE NURSING ASSESSMENT: Performed by: COLON & RECTAL SURGERY

## 2023-02-16 PROCEDURE — 250N000013 HC RX MED GY IP 250 OP 250 PS 637

## 2023-02-16 PROCEDURE — 82947 ASSAY GLUCOSE BLOOD QUANT: CPT | Performed by: ANESTHESIOLOGY

## 2023-02-16 PROCEDURE — 83605 ASSAY OF LACTIC ACID: CPT | Performed by: PHYSICIAN ASSISTANT

## 2023-02-16 PROCEDURE — 0T2BX0Z CHANGE DRAINAGE DEVICE IN BLADDER, EXTERNAL APPROACH: ICD-10-PCS | Performed by: COLON & RECTAL SURGERY

## 2023-02-16 PROCEDURE — 258N000001 HC RX 258: Performed by: COLON & RECTAL SURGERY

## 2023-02-16 PROCEDURE — 250N000013 HC RX MED GY IP 250 OP 250 PS 637: Performed by: COLON & RECTAL SURGERY

## 2023-02-16 PROCEDURE — 84132 ASSAY OF SERUM POTASSIUM: CPT | Performed by: ANESTHESIOLOGY

## 2023-02-16 PROCEDURE — 250N000011 HC RX IP 250 OP 636: Performed by: ANESTHESIOLOGY

## 2023-02-16 PROCEDURE — 250N000011 HC RX IP 250 OP 636: Performed by: COLON & RECTAL SURGERY

## 2023-02-16 PROCEDURE — 370N000017 HC ANESTHESIA TECHNICAL FEE, PER MIN: Performed by: COLON & RECTAL SURGERY

## 2023-02-16 PROCEDURE — 36415 COLL VENOUS BLD VENIPUNCTURE: CPT | Performed by: ANESTHESIOLOGY

## 2023-02-16 PROCEDURE — 99221 1ST HOSP IP/OBS SF/LOW 40: CPT | Performed by: PHYSICIAN ASSISTANT

## 2023-02-16 PROCEDURE — 250N000009 HC RX 250: Performed by: COLON & RECTAL SURGERY

## 2023-02-16 PROCEDURE — 250N000025 HC SEVOFLURANE, PER MIN: Performed by: COLON & RECTAL SURGERY

## 2023-02-16 PROCEDURE — 250N000009 HC RX 250: Performed by: NURSE ANESTHETIST, CERTIFIED REGISTERED

## 2023-02-16 RX ORDER — ONDANSETRON 2 MG/ML
4 INJECTION INTRAMUSCULAR; INTRAVENOUS EVERY 6 HOURS PRN
Status: DISCONTINUED | OUTPATIENT
Start: 2023-02-16 | End: 2023-02-19 | Stop reason: HOSPADM

## 2023-02-16 RX ORDER — METRONIDAZOLE 500 MG/100ML
500 INJECTION, SOLUTION INTRAVENOUS
Status: COMPLETED | OUTPATIENT
Start: 2023-02-16 | End: 2023-02-16

## 2023-02-16 RX ORDER — SODIUM CHLORIDE, SODIUM LACTATE, POTASSIUM CHLORIDE, CALCIUM CHLORIDE 600; 310; 30; 20 MG/100ML; MG/100ML; MG/100ML; MG/100ML
INJECTION, SOLUTION INTRAVENOUS CONTINUOUS
Status: DISCONTINUED | OUTPATIENT
Start: 2023-02-16 | End: 2023-02-16 | Stop reason: HOSPADM

## 2023-02-16 RX ORDER — LIDOCAINE HYDROCHLORIDE 20 MG/ML
INJECTION, SOLUTION INFILTRATION; PERINEURAL PRN
Status: DISCONTINUED | OUTPATIENT
Start: 2023-02-16 | End: 2023-02-16

## 2023-02-16 RX ORDER — ONDANSETRON 4 MG/1
4 TABLET, ORALLY DISINTEGRATING ORAL EVERY 30 MIN PRN
Status: DISCONTINUED | OUTPATIENT
Start: 2023-02-16 | End: 2023-02-16 | Stop reason: HOSPADM

## 2023-02-16 RX ORDER — FENTANYL CITRATE 0.05 MG/ML
50 INJECTION, SOLUTION INTRAMUSCULAR; INTRAVENOUS
Status: DISCONTINUED | OUTPATIENT
Start: 2023-02-16 | End: 2023-02-16 | Stop reason: HOSPADM

## 2023-02-16 RX ORDER — OXYCODONE HYDROCHLORIDE 5 MG/1
5 TABLET ORAL EVERY 4 HOURS PRN
Status: DISCONTINUED | OUTPATIENT
Start: 2023-02-16 | End: 2023-02-19 | Stop reason: HOSPADM

## 2023-02-16 RX ORDER — FENTANYL CITRATE 0.05 MG/ML
50 INJECTION, SOLUTION INTRAMUSCULAR; INTRAVENOUS EVERY 5 MIN PRN
Status: DISCONTINUED | OUTPATIENT
Start: 2023-02-16 | End: 2023-02-16 | Stop reason: HOSPADM

## 2023-02-16 RX ORDER — BUPIVACAINE HYDROCHLORIDE AND EPINEPHRINE 5; 5 MG/ML; UG/ML
INJECTION, SOLUTION PERINEURAL PRN
Status: DISCONTINUED | OUTPATIENT
Start: 2023-02-16 | End: 2023-02-16 | Stop reason: HOSPADM

## 2023-02-16 RX ORDER — CARVEDILOL 25 MG/1
25 TABLET ORAL 2 TIMES DAILY WITH MEALS
Status: DISCONTINUED | OUTPATIENT
Start: 2023-02-16 | End: 2023-02-19 | Stop reason: HOSPADM

## 2023-02-16 RX ORDER — NALOXONE HYDROCHLORIDE 0.4 MG/ML
0.2 INJECTION, SOLUTION INTRAMUSCULAR; INTRAVENOUS; SUBCUTANEOUS
Status: DISCONTINUED | OUTPATIENT
Start: 2023-02-16 | End: 2023-02-19 | Stop reason: HOSPADM

## 2023-02-16 RX ORDER — LIDOCAINE 40 MG/G
CREAM TOPICAL
Status: DISCONTINUED | OUTPATIENT
Start: 2023-02-16 | End: 2023-02-19 | Stop reason: HOSPADM

## 2023-02-16 RX ORDER — KETOROLAC TROMETHAMINE 30 MG/ML
INJECTION, SOLUTION INTRAMUSCULAR; INTRAVENOUS PRN
Status: DISCONTINUED | OUTPATIENT
Start: 2023-02-16 | End: 2023-02-16

## 2023-02-16 RX ORDER — TAMSULOSIN HYDROCHLORIDE 0.4 MG/1
0.4 CAPSULE ORAL EVERY EVENING
Status: DISCONTINUED | OUTPATIENT
Start: 2023-02-16 | End: 2023-02-19 | Stop reason: HOSPADM

## 2023-02-16 RX ORDER — HYDROMORPHONE HCL IN WATER/PF 6 MG/30 ML
0.4 PATIENT CONTROLLED ANALGESIA SYRINGE INTRAVENOUS
Status: DISCONTINUED | OUTPATIENT
Start: 2023-02-16 | End: 2023-02-19 | Stop reason: HOSPADM

## 2023-02-16 RX ORDER — SODIUM CHLORIDE, SODIUM LACTATE, POTASSIUM CHLORIDE, CALCIUM CHLORIDE 600; 310; 30; 20 MG/100ML; MG/100ML; MG/100ML; MG/100ML
INJECTION, SOLUTION INTRAVENOUS CONTINUOUS
Status: DISCONTINUED | OUTPATIENT
Start: 2023-02-16 | End: 2023-02-17

## 2023-02-16 RX ORDER — ENOXAPARIN SODIUM 100 MG/ML
40 INJECTION SUBCUTANEOUS EVERY 24 HOURS
Status: DISCONTINUED | OUTPATIENT
Start: 2023-02-17 | End: 2023-02-19 | Stop reason: HOSPADM

## 2023-02-16 RX ORDER — FENTANYL CITRATE 50 UG/ML
INJECTION, SOLUTION INTRAMUSCULAR; INTRAVENOUS PRN
Status: DISCONTINUED | OUTPATIENT
Start: 2023-02-16 | End: 2023-02-16

## 2023-02-16 RX ORDER — ONDANSETRON 4 MG/1
4 TABLET, ORALLY DISINTEGRATING ORAL EVERY 6 HOURS PRN
Status: DISCONTINUED | OUTPATIENT
Start: 2023-02-16 | End: 2023-02-19 | Stop reason: HOSPADM

## 2023-02-16 RX ORDER — PROPOFOL 10 MG/ML
INJECTION, EMULSION INTRAVENOUS PRN
Status: DISCONTINUED | OUTPATIENT
Start: 2023-02-16 | End: 2023-02-16

## 2023-02-16 RX ORDER — NICOTINE POLACRILEX 4 MG
15-30 LOZENGE BUCCAL
Status: DISCONTINUED | OUTPATIENT
Start: 2023-02-16 | End: 2023-02-19 | Stop reason: HOSPADM

## 2023-02-16 RX ORDER — HYDROMORPHONE HCL IN WATER/PF 6 MG/30 ML
0.4 PATIENT CONTROLLED ANALGESIA SYRINGE INTRAVENOUS EVERY 5 MIN PRN
Status: DISCONTINUED | OUTPATIENT
Start: 2023-02-16 | End: 2023-02-16 | Stop reason: HOSPADM

## 2023-02-16 RX ORDER — DEXTROSE MONOHYDRATE 25 G/50ML
25-50 INJECTION, SOLUTION INTRAVENOUS
Status: DISCONTINUED | OUTPATIENT
Start: 2023-02-16 | End: 2023-02-19 | Stop reason: HOSPADM

## 2023-02-16 RX ORDER — OXYCODONE HYDROCHLORIDE 5 MG/1
10 TABLET ORAL EVERY 4 HOURS PRN
Status: DISCONTINUED | OUTPATIENT
Start: 2023-02-16 | End: 2023-02-19 | Stop reason: HOSPADM

## 2023-02-16 RX ORDER — CEFAZOLIN SODIUM/WATER 2 G/20 ML
2 SYRINGE (ML) INTRAVENOUS
Status: COMPLETED | OUTPATIENT
Start: 2023-02-16 | End: 2023-02-16

## 2023-02-16 RX ORDER — MAGNESIUM HYDROXIDE 1200 MG/15ML
LIQUID ORAL PRN
Status: DISCONTINUED | OUTPATIENT
Start: 2023-02-16 | End: 2023-02-16 | Stop reason: HOSPADM

## 2023-02-16 RX ORDER — OXYCODONE HYDROCHLORIDE 5 MG/1
10 TABLET ORAL EVERY 4 HOURS PRN
Status: DISCONTINUED | OUTPATIENT
Start: 2023-02-16 | End: 2023-02-16 | Stop reason: HOSPADM

## 2023-02-16 RX ORDER — HYDRALAZINE HYDROCHLORIDE 20 MG/ML
10 INJECTION INTRAMUSCULAR; INTRAVENOUS EVERY 4 HOURS PRN
Status: DISCONTINUED | OUTPATIENT
Start: 2023-02-16 | End: 2023-02-19 | Stop reason: HOSPADM

## 2023-02-16 RX ORDER — ACETAMINOPHEN 325 MG/1
650 TABLET ORAL EVERY 4 HOURS PRN
Status: DISCONTINUED | OUTPATIENT
Start: 2023-02-19 | End: 2023-02-19 | Stop reason: HOSPADM

## 2023-02-16 RX ORDER — OXYCODONE HYDROCHLORIDE 5 MG/1
5 TABLET ORAL EVERY 4 HOURS PRN
Status: DISCONTINUED | OUTPATIENT
Start: 2023-02-16 | End: 2023-02-16 | Stop reason: HOSPADM

## 2023-02-16 RX ORDER — LISINOPRIL 40 MG/1
40 TABLET ORAL EVERY EVENING
Status: DISCONTINUED | OUTPATIENT
Start: 2023-02-16 | End: 2023-02-19 | Stop reason: HOSPADM

## 2023-02-16 RX ORDER — FENTANYL CITRATE 0.05 MG/ML
25 INJECTION, SOLUTION INTRAMUSCULAR; INTRAVENOUS EVERY 5 MIN PRN
Status: DISCONTINUED | OUTPATIENT
Start: 2023-02-16 | End: 2023-02-16 | Stop reason: HOSPADM

## 2023-02-16 RX ORDER — ACETAMINOPHEN 325 MG/1
975 TABLET ORAL EVERY 8 HOURS
Status: COMPLETED | OUTPATIENT
Start: 2023-02-16 | End: 2023-02-19

## 2023-02-16 RX ORDER — HEPARIN SODIUM 5000 [USP'U]/.5ML
5000 INJECTION, SOLUTION INTRAVENOUS; SUBCUTANEOUS
Status: COMPLETED | OUTPATIENT
Start: 2023-02-16 | End: 2023-02-16

## 2023-02-16 RX ORDER — HYDROMORPHONE HCL IN WATER/PF 6 MG/30 ML
0.2 PATIENT CONTROLLED ANALGESIA SYRINGE INTRAVENOUS
Status: DISCONTINUED | OUTPATIENT
Start: 2023-02-16 | End: 2023-02-19 | Stop reason: HOSPADM

## 2023-02-16 RX ORDER — PROPOFOL 10 MG/ML
INJECTION, EMULSION INTRAVENOUS CONTINUOUS PRN
Status: DISCONTINUED | OUTPATIENT
Start: 2023-02-16 | End: 2023-02-16

## 2023-02-16 RX ORDER — NALOXONE HYDROCHLORIDE 0.4 MG/ML
0.4 INJECTION, SOLUTION INTRAMUSCULAR; INTRAVENOUS; SUBCUTANEOUS
Status: DISCONTINUED | OUTPATIENT
Start: 2023-02-16 | End: 2023-02-19 | Stop reason: HOSPADM

## 2023-02-16 RX ORDER — ONDANSETRON 2 MG/ML
4 INJECTION INTRAMUSCULAR; INTRAVENOUS EVERY 30 MIN PRN
Status: DISCONTINUED | OUTPATIENT
Start: 2023-02-16 | End: 2023-02-16 | Stop reason: HOSPADM

## 2023-02-16 RX ORDER — RAMIPRIL 10 MG/1
10 CAPSULE ORAL 2 TIMES DAILY
Status: DISCONTINUED | OUTPATIENT
Start: 2023-02-17 | End: 2023-02-16

## 2023-02-16 RX ORDER — HYDROMORPHONE HCL IN WATER/PF 6 MG/30 ML
0.2 PATIENT CONTROLLED ANALGESIA SYRINGE INTRAVENOUS EVERY 5 MIN PRN
Status: DISCONTINUED | OUTPATIENT
Start: 2023-02-16 | End: 2023-02-16 | Stop reason: HOSPADM

## 2023-02-16 RX ORDER — AMLODIPINE BESYLATE 2.5 MG/1
2.5 TABLET ORAL 2 TIMES DAILY
Status: DISCONTINUED | OUTPATIENT
Start: 2023-02-17 | End: 2023-02-18

## 2023-02-16 RX ORDER — CEFAZOLIN SODIUM/WATER 2 G/20 ML
2 SYRINGE (ML) INTRAVENOUS SEE ADMIN INSTRUCTIONS
Status: DISCONTINUED | OUTPATIENT
Start: 2023-02-16 | End: 2023-02-16 | Stop reason: HOSPADM

## 2023-02-16 RX ORDER — ACETAMINOPHEN 325 MG/1
975 TABLET ORAL ONCE
Status: COMPLETED | OUTPATIENT
Start: 2023-02-16 | End: 2023-02-16

## 2023-02-16 RX ORDER — ONDANSETRON 2 MG/ML
INJECTION INTRAMUSCULAR; INTRAVENOUS PRN
Status: DISCONTINUED | OUTPATIENT
Start: 2023-02-16 | End: 2023-02-16

## 2023-02-16 RX ADMIN — PROPOFOL 200 MG: 10 INJECTION, EMULSION INTRAVENOUS at 07:29

## 2023-02-16 RX ADMIN — ROCURONIUM BROMIDE 30 MG: 50 INJECTION, SOLUTION INTRAVENOUS at 09:45

## 2023-02-16 RX ADMIN — ROCURONIUM BROMIDE 60 MG: 50 INJECTION, SOLUTION INTRAVENOUS at 07:29

## 2023-02-16 RX ADMIN — SODIUM CHLORIDE 4 UNITS: 9 INJECTION, SOLUTION INTRAVENOUS at 12:04

## 2023-02-16 RX ADMIN — SODIUM CHLORIDE, POTASSIUM CHLORIDE, SODIUM LACTATE AND CALCIUM CHLORIDE: 600; 310; 30; 20 INJECTION, SOLUTION INTRAVENOUS at 15:47

## 2023-02-16 RX ADMIN — TAMSULOSIN HYDROCHLORIDE 0.4 MG: 0.4 CAPSULE ORAL at 20:32

## 2023-02-16 RX ADMIN — ROCURONIUM BROMIDE 40 MG: 50 INJECTION, SOLUTION INTRAVENOUS at 08:15

## 2023-02-16 RX ADMIN — MIDAZOLAM 1 MG: 1 INJECTION INTRAMUSCULAR; INTRAVENOUS at 07:23

## 2023-02-16 RX ADMIN — SODIUM CHLORIDE, POTASSIUM CHLORIDE, SODIUM LACTATE AND CALCIUM CHLORIDE: 600; 310; 30; 20 INJECTION, SOLUTION INTRAVENOUS at 11:20

## 2023-02-16 RX ADMIN — SODIUM CHLORIDE, POTASSIUM CHLORIDE, SODIUM LACTATE AND CALCIUM CHLORIDE: 600; 310; 30; 20 INJECTION, SOLUTION INTRAVENOUS at 06:18

## 2023-02-16 RX ADMIN — SODIUM CHLORIDE, POTASSIUM CHLORIDE, SODIUM LACTATE AND CALCIUM CHLORIDE: 600; 310; 30; 20 INJECTION, SOLUTION INTRAVENOUS at 09:45

## 2023-02-16 RX ADMIN — LIDOCAINE HYDROCHLORIDE 100 MG: 20 INJECTION, SOLUTION INFILTRATION; PERINEURAL at 07:29

## 2023-02-16 RX ADMIN — PHENYLEPHRINE HYDROCHLORIDE 100 MCG: 10 INJECTION INTRAVENOUS at 10:36

## 2023-02-16 RX ADMIN — PHENYLEPHRINE HYDROCHLORIDE 200 MCG: 10 INJECTION INTRAVENOUS at 07:38

## 2023-02-16 RX ADMIN — FENTANYL CITRATE 25 MCG: 50 INJECTION, SOLUTION INTRAMUSCULAR; INTRAVENOUS at 13:11

## 2023-02-16 RX ADMIN — OXYCODONE HYDROCHLORIDE 5 MG: 5 TABLET ORAL at 20:32

## 2023-02-16 RX ADMIN — ONDANSETRON 4 MG: 2 INJECTION INTRAMUSCULAR; INTRAVENOUS at 10:52

## 2023-02-16 RX ADMIN — ACETAMINOPHEN 975 MG: 325 TABLET, FILM COATED ORAL at 22:29

## 2023-02-16 RX ADMIN — MIDAZOLAM 1 MG: 1 INJECTION INTRAMUSCULAR; INTRAVENOUS at 08:09

## 2023-02-16 RX ADMIN — FENTANYL CITRATE 25 MCG: 50 INJECTION, SOLUTION INTRAMUSCULAR; INTRAVENOUS at 14:22

## 2023-02-16 RX ADMIN — ACETAMINOPHEN 975 MG: 325 TABLET, FILM COATED ORAL at 15:47

## 2023-02-16 RX ADMIN — ROCURONIUM BROMIDE 10 MG: 50 INJECTION, SOLUTION INTRAVENOUS at 10:31

## 2023-02-16 RX ADMIN — ACETAMINOPHEN 975 MG: 325 TABLET, FILM COATED ORAL at 06:17

## 2023-02-16 RX ADMIN — FENTANYL CITRATE 50 MCG: 50 INJECTION, SOLUTION INTRAMUSCULAR; INTRAVENOUS at 07:29

## 2023-02-16 RX ADMIN — ROCURONIUM BROMIDE 10 MG: 50 INJECTION, SOLUTION INTRAVENOUS at 09:16

## 2023-02-16 RX ADMIN — PHENYLEPHRINE HYDROCHLORIDE 200 MCG: 10 INJECTION INTRAVENOUS at 07:51

## 2023-02-16 RX ADMIN — PROPOFOL 25 MCG/KG/MIN: 10 INJECTION, EMULSION INTRAVENOUS at 08:00

## 2023-02-16 RX ADMIN — KETOROLAC TROMETHAMINE 15 MG: 30 INJECTION, SOLUTION INTRAMUSCULAR at 11:01

## 2023-02-16 RX ADMIN — PHENYLEPHRINE HYDROCHLORIDE 100 MCG: 10 INJECTION INTRAVENOUS at 08:59

## 2023-02-16 RX ADMIN — ROCURONIUM BROMIDE 10 MG: 50 INJECTION, SOLUTION INTRAVENOUS at 10:27

## 2023-02-16 RX ADMIN — HEPARIN SODIUM 5000 UNITS: 5000 INJECTION, SOLUTION INTRAVENOUS; SUBCUTANEOUS at 07:07

## 2023-02-16 RX ADMIN — CARVEDILOL 25 MG: 25 TABLET, FILM COATED ORAL at 17:56

## 2023-02-16 RX ADMIN — LISINOPRIL 40 MG: 40 TABLET ORAL at 20:32

## 2023-02-16 RX ADMIN — FENTANYL CITRATE 50 MCG: 50 INJECTION, SOLUTION INTRAMUSCULAR; INTRAVENOUS at 08:04

## 2023-02-16 RX ADMIN — FENTANYL CITRATE 50 MCG: 50 INJECTION, SOLUTION INTRAMUSCULAR; INTRAVENOUS at 11:48

## 2023-02-16 RX ADMIN — SUGAMMADEX 250 MG: 100 INJECTION, SOLUTION INTRAVENOUS at 11:15

## 2023-02-16 RX ADMIN — ROCURONIUM BROMIDE 10 MG: 50 INJECTION, SOLUTION INTRAVENOUS at 10:18

## 2023-02-16 RX ADMIN — PHENYLEPHRINE HYDROCHLORIDE 200 MCG: 10 INJECTION INTRAVENOUS at 07:40

## 2023-02-16 RX ADMIN — METRONIDAZOLE 500 MG: 500 INJECTION, SOLUTION INTRAVENOUS at 06:18

## 2023-02-16 RX ADMIN — SODIUM CHLORIDE, POTASSIUM CHLORIDE, SODIUM LACTATE AND CALCIUM CHLORIDE: 600; 310; 30; 20 INJECTION, SOLUTION INTRAVENOUS at 23:49

## 2023-02-16 RX ADMIN — PHENYLEPHRINE HYDROCHLORIDE 200 MCG: 10 INJECTION INTRAVENOUS at 07:46

## 2023-02-16 RX ADMIN — Medication 2 G: at 07:26

## 2023-02-16 ASSESSMENT — ACTIVITIES OF DAILY LIVING (ADL)
DIFFICULTY_EATING/SWALLOWING: NO
DIFFICULTY_COMMUNICATING: NO
ADLS_ACUITY_SCORE: 20
ADLS_ACUITY_SCORE: 20
DRESSING/BATHING_DIFFICULTY: NO
ADLS_ACUITY_SCORE: 20
CONCENTRATING,_REMEMBERING_OR_MAKING_DECISIONS_DIFFICULTY: NO
ADLS_ACUITY_SCORE: 20
FALL_HISTORY_WITHIN_LAST_SIX_MONTHS: NO
ADLS_ACUITY_SCORE: 20
DOING_ERRANDS_INDEPENDENTLY_DIFFICULTY: NO
ADLS_ACUITY_SCORE: 20
WEAR_GLASSES_OR_BLIND: NO
TOILETING_ISSUES: NO
ADLS_ACUITY_SCORE: 20
WALKING_OR_CLIMBING_STAIRS_DIFFICULTY: NO

## 2023-02-16 NOTE — CONSULTS
[facility]  Consult Note - Hospitalist Service     Date of Admission:  2/16/2023  Consult Requested by: Dr. Peace  Reason for Consult: For medical management of comorbidities  PRIMARY CARE PROVIDER:    Marce Loving    Assessment & Plan   Travis Villa is a 77 year old male admitted on 2/16/2023.    Past medical history significant for Obstructing cecal polyp, CAD s/p stenting 2008, HTN, Aortic stenosis, DM2, BPH with elevated PSA levels, Urinary retention with chronic indwelling Marrufo Catheter, Chronic pain on chronic narcotics, GERD, OA with cervical and lumbar DDD, Obesity, History of SCC who underwent an elective right hemicolectomy.      Pre-op H&P and Op notes reviewed.  Surgery was performed under general anesthesia with an EBL documented at 20 ml.      Obstructing cecal polyp s/p right hemicolectomy  POD #0.   - Colorectal Surgery is managing.   --Defer analgesic management, DVT prophylaxis, PT/OT.    - HGB check in the morning.    - Encourage utilization of incentive spirometer.     CAD s/p stenting 2008  HTN  - Defer resumption of PTA ASA 81 mg BID  to Colorectal Surgery.    - Hold PTA indapamide 1.25 mg every morning (last took 2/15 AM).   - Resumed on PTA Coreg 25 mg BID with hold parameters.  Took morning of surgery.    - Resumed on PTA amlodipine 2.5 mg BID with hold parameters (last took 2/15 PM).   - Resumed on PTA ramipril 10 mg BID on POD #1 with hold parameters (last took 2/15 PM).    - PRN IV hydralazine available for SBP > 180.    - Check BMP in the morning.      Aortic stenosis, moderate  Systolic murmur  - Judicious IV fluids per Colorectal; would stop as soon as patient tolerates PO fluids.    - Monitor O2 sats.      DM2  Hyperglycemia  Recent A1c of 7.2% from 1/30/2023.  Glucose in the PACU reported at 223 and was administered 4 units Regular insulin.    - Hold PTA metformin 500 mg BID.    - Medium intensity sliding scale insulin.    - Glucose checks.    -  "Hypoglycemic protocol.      BPH with elevated PSA levels  Urinary retention with chronic indwelling Marrufo Catheter  - Continue with indwelling Marrufo Catheter.     --This was exchanged in the OR.    - Resumed on PTA Flomax 0.4 mg every evening.      Chronic pain on chronic narcotics  OA with cervical and lumbar DDD  - Analgesic management per Colorectal Surgery.      GERD  Not currently on any medications.    - Monitor.      Obesity   Body mass index is 33.51 kg/m .  Increase in all-cause morbidity and mortality.   - Follow up with PCP regarding ongoing management.    - Monitor O2 saturations.      History of SCC  No interventions.      Clinically Significant Risk Factors Present on Admission                  # Hypertension: home medication list includes antihypertensive(s)      # Obesity: Estimated body mass index is 33.51 kg/m  as calculated from the following:    Height as of this encounter: 1.88 m (6' 2\").    Weight as of this encounter: 118.4 kg (261 lb).             Diet: Clear liquid diet per Colorectal surgery.    DVT Prophylaxis: Defer to primary service   Marrufo Catheter: PRESENT, indication: Retention (also enlargerd prostrate), /GI/GYN Pelvic Procedure  Lines: None     Cardiac Monitoring: None  Code Status: FULL CODE; confirmed with the patient.      Disposition Plan    Per Colorectal Surgery.      The patient's care was discussed with the Bedside Nurse and Patient.    The patient has been discussed with Dr. Tatum, who agrees with the assessment and plan at this time.    At this time, I'd like to thank Dr. Peace for consulting the Hospitalist service.  We will continue to follow.        Pastor Martines PA-C  [facility]  Securely message with the [employer] Web Console (learn more here)  Text page via [employer] Paging/Directory    ______________________________________________________________________    Chief Complaint   Elective right hemicolectomy.    History is obtained " from the patient and EMR.      History of Present Illness   Travis Villa is a 77 year old male with a past medical history significant for Obstructing cecal polyp, CAD s/p stenting 2008, HTN, Aortic stenosis, DM2, BPH with elevated PSA levels, Urinary retention with chronic indwelling Marrufo Catheter, Chronic pain on chronic narcotics, GERD, OA with cervical and lumbar DDD, Obesity, History of SCC who underwent an elective right hemicolectomy.      Pre-op H&P and Op notes reviewed.  Surgery was performed under general anesthesia with an EBL documented at 20 ml.      Patient was seen in the PACU where he was lying comfortably in bed upon arrival.  Reviewed with nursing staff prior to assessing the patient as well as afterwards.  Initially, we reviewed his medical history and home medications.  Upon review patient indicated he is not on any medications for high cholesterol.  We spoke about his urinary retention with Marrufo catheter and asked to the Marrufo catheter had been exchanged in the OR.    Upon questioning, patient stated that he currently has abdominal pain due to the surgery.  We reviewed events that led to his diagnosis of the obstructing cecal polyp with constipation and change in bowel movements.  Patient indicates he is always in some type of pain specifically in his back and hips.  He has chronic numbness and tingling that affects his hands and he stated that this has been present for some time.    Stated he resides in a house in [place], [place] with his wife.  He does not have a history of current use of tobacco products.  He denies alcohol use or street illicit drug use.  He does not currently utilize a cane or a walker.  He does not utilize supplemental oxygen or CPAP.    Reviewed CODE STATUS with the patient and he elected to be full code.    Reviewed plans with patient in regards to management of blood pressure (hold parameters), diabetes (holding oral medication and use of subcutaneous  insulin).  Patient understood and agreed with this plan.        Past Medical History    I have reviewed this patient's medical history and updated it with pertinent information if needed.   Past Medical History:   Diagnosis Date     Adenomatous colon polyp      Aortic stenosis      Benign essential HTN      Benign prostatic hyperplasia      BPH (benign prostatic hyperplasia)      Chronic narcotic use      Coronary artery disease involving native coronary artery of native heart      DDD (degenerative disc disease), cervical      DDD (degenerative disc disease), lumbar      Diabetes mellitus (H)      Elevated prostate specific antigen (PSA)      GERD (gastroesophageal reflux disease)      Indwelling Marrufo catheter present     for BPH     Mixed hyperlipidemia      Morbid obesity (H)      Myocardial infarction (H)      Obesity      Osteoarthritis      Squamous cell carcinoma in situ of skin of face      Synovial cyst of right popliteal space      Umbilical hernia with obstruction      Unspecified disorder of metabolism      Vitamin D deficiency        Medications   Prior to Admission Medications   Prescriptions Last Dose Informant Patient Reported? Taking?   Cholecalciferol (VITAMIN D3 PO) 2/12/2023 at pm Self Yes Yes   Sig: Take 1 capsule by mouth At Bedtime   Flaxseed, Linseed, OIL 2/12/2023 at am Self Yes Yes   Sig: Take 1 tablet by mouth daily   GLUCOSAMINE SULFATE PO 2/12/2023 at am Self Yes Yes   Sig: Take 1 tablet by mouth daily   albuterol (PROAIR HFA/PROVENTIL HFA/VENTOLIN HFA) 108 (90 Base) MCG/ACT inhaler Unknown at prn Self Yes Yes   Sig: Inhale 2 puffs into the lungs every 6 hours as needed for shortness of breath, wheezing or cough   amLODIPine (NORVASC) 2.5 MG tablet 2/15/2023 at pm Self No Yes   Sig: Take 1 tablet (2.5 mg) by mouth 2 times daily   aspirin 81 MG EC tablet 2/15/2023 at pm Self Yes Yes   Sig: Take 81 mg by mouth 2 times daily   carvedilol (COREG) 25 MG tablet 2/16/2023 at am Self No Yes  "  Sig: Take 1 tablet (25 mg) by mouth 2 times daily (with meals)   diclofenac (VOLTAREN) 1 % topical gel Unknown at prn Self Yes Yes   Sig: Apply topically as needed for moderate pain (4-6)   fish oil-omega-3 fatty acids 1000 MG capsule 2/12/2023 at pm Self Yes Yes   Sig: Take 1 capsule by mouth 2 times daily (with meals)   indapamide (LOZOL) 1.25 MG tablet 2/15/2023 at am Self No Yes   Sig: Take 1 tablet (1.25 mg) by mouth every morning   metFORMIN (GLUCOPHAGE) 500 MG tablet 2/15/2023 at pm Self Yes Yes   Sig: Take 500 mg by mouth 2 times daily (with meals)    multivitamin w/minerals (THERA-VIT-M) tablet 2/12/2023 at am Self Yes Yes   Sig: Take 1 tablet by mouth daily.   naproxen sodium (ANAPROX) 220 MG tablet Unknown at prn Self Yes Yes   Sig: Take 220-440 mg by mouth as needed for moderate pain (4-6)   nitroGLYcerin (NITROSTAT) 0.4 MG sublingual tablet Unknown at prn Self No Yes   Sig: Place 1 tablet (0.4 mg) under the tongue every 5 minutes as needed for chest pain   oxyCODONE-acetaminophen (PERCOCET) 5-325 MG tablet Unknown at prn Self Yes Yes   Sig: Take 1 tablet by mouth every 6 hours as needed for severe pain (7-10)   ramipril (ALTACE) 10 MG capsule 2/15/2023 at pm Self No Yes   Sig: Take 1 capsule (10 mg) by mouth 2 times daily   tamsulosin (FLOMAX) 0.4 MG capsule 2/15/2023 at pm Self Yes Yes   Sig: Take 0.4 mg by mouth every evening      Facility-Administered Medications: None     Allergies   Allergies   Allergen Reactions     Atorvastatin      Muscle aches       Physical Exam   BP (!) 145/73 (BP Location: Right arm)   Pulse 60   Temp 97  F (36.1  C) (Temporal)   Resp 16   Ht 1.88 m (6' 2\")   Wt 118.4 kg (261 lb)   SpO2 95%   BMI 33.51 kg/m      Constitutional: Groggy but remained awake and alert.  He was cooperative and in no apparent distress.    ENT: Normocephalic, without obvious abnormality, atraumatic, oral pharynx with dry mucus membranes, tonsils without erythema or exudates.  Eyes with " normal sclera.    Neck: Supple, symmetrical, trachea midline, no adenopathy.  Pulmonary: No increased work of breathing, fair air exchange, clear to auscultation bilaterally, no crackles or wheezing.  Cardiovascular: Regular rate and rhythm, normal S1 and S2, no S3 or S4, and systolic murmur noted.  GI: Hypoactive bowel sounds.  Obese.  Skin/Integumen: Visualized skin appeared clear.  Neuro: CN II-XII grossly intact.  Upper and lower extremities strength, coordination and sensation intact bilaterally.    Psych:  Alert and oriented x 3. Normal affect.  Extremities: No lower extremity edema noted, and calves are non-tender to palpation bilaterally.    : Marrufo catheter in place with urine in the bag.      Medical Decision Making       Please see A&P for additional details of medical decision making.  Greater than 45 MINUTES SPENT BY ME on the date of service doing chart review, history, exam, documentation & further activities per the note.         Data   Results for orders placed or performed during the hospital encounter of 02/16/23 (from the past 24 hour(s))   Potassium   Result Value Ref Range    Potassium 3.5 3.4 - 5.3 mmol/L   Glucose   Result Value Ref Range    Glucose 166 (H) 70 - 99 mg/dL   Extra Tube    Narrative    The following orders were created for panel order Extra Tube.  Procedure                               Abnormality         Status                     ---------                               -----------         ------                     Extra Blood Bank Purple ...[[id number]]                      Final result               Extra Purple Top Tube ON...[[id number]]                                                   Please view results for these tests on the individual orders.   Extra Blood Bank Purple Top Tube   Result Value Ref Range    Hold Specimen [facility]    Extra Tube    Narrative    The following orders were created for panel order Extra Tube.  Procedure                               Abnormality          Status                     ---------                               -----------         ------                     Extra Purple Top Tube[[id number]]                            Final result                 Please view results for these tests on the individual orders.   Extra Purple Top Tube   Result Value Ref Range    Hold Specimen [facility]    Glucose by meter   Result Value Ref Range    GLUCOSE BY METER POCT 223 (H) 70 - 99 mg/dL   Glucose by meter   Result Value Ref Range    GLUCOSE BY METER POCT 214 (H) 70 - 99 mg/dL

## 2023-02-16 NOTE — BRIEF OP NOTE
[facility]    Brief Operative Note    Pre-operative diagnosis: Adenomatous polyp of ascending colon [D12.2]  Post-operative diagnosis Same as pre-operative diagnosis    Procedure: Procedure(s):  Laparoscopic Right Hemicolectomy and skaggs exchange  UMBILICAL HERNIA REPAIR  Surgeon: Surgeon(s) and Role:     * Ingrid Peace MD - Primary     * Danni Trejo PA-C - Assisting  Anesthesia: General   Estimated Blood Loss: 20    Drains: None  Specimens:   ID Type Source Tests Collected by Time Destination   1 : Hernia Contents Tissue Other SURGICAL PATHOLOGY EXAM Ingrid Peace MD 2/16/2023 10:20 AM    2 : Terminal Ileum, Right Colon, Appendix Tissue Other SURGICAL PATHOLOGY EXAM Ingrid Peace MD 2/16/2023 10:45 AM      Findings:   None.  Complications: None.  Implants: * No implants in log *    Condition on discharge from OR: Satisfactory    DANNI TREJO PA-C   [facility]   [phone].        ADDENDUM:    PATIENT DATA  Indicate Y or N:  Home O2 No  Hemodialysis  No  Transplant patient  No  Cirrhosis  No  Steroids in last 30 days  No  Immunomodulators in last 30 days  No  Anticoagulation at time of surgery  No   List medication n/a  Prior abdominal surgery  No  Pelvic irradiation  No    Albumin within 30 days if known   Lab Results   Component Value Date    ALBUMIN 4.2 12/29/2012        Hgb within 30 days if known    Hemoglobin   Date Value Ref Range Status   02/27/2019 14.3 13.3 - 17.7 g/dL Final   ]  Cr within 30 days if known    Creatinine   Date Value Ref Range Status   02/27/2019 0.46 (L) 0.66 - 1.25 mg/dL Final     Creatinine POCT   Date Value Ref Range Status   01/24/2023 0.5 (L) 0.7 - 1.3 mg/dL Final   ]  Body mass index is 33.51 kg/m .      OR DATA  Emergent  No   <24 hours  No   <1 week  No  Bowel Prep Yes  Antibiotics  Yes  DVT prophylaxis    Heparin  Yes   SCD  Yes   None  No  Drain  No  ASA (1,2,3,4) 3  OR time (min)  180  Stents  No  Transfuse >/= 2U  No  Anastomosis   Stapled  Yes   Handsewn  No  Leak Test    Positive  No   Negative  No   Not done  Yes

## 2023-02-16 NOTE — ANESTHESIA CARE TRANSFER NOTE
Patient: Travis Villa    Procedure: Procedure(s):  Laparoscopic Right Hemicolectomy and skaggs exchange  UMBILICAL HERNIA REPAIR       Diagnosis: Adenomatous polyp of ascending colon [D12.2]  Diagnosis Additional Information: No value filed.    Anesthesia Type:   General     Note:    Oropharynx: oropharynx clear of all foreign objects and spontaneously breathing  Level of Consciousness: awake  Oxygen Supplementation: face mask  Level of Supplemental Oxygen (L/min / FiO2): 6  Independent Airway: airway patency satisfactory and stable  Dentition: dentition unchanged  Vital Signs Stable: post-procedure vital signs reviewed and stable  Report to RN Given: handoff report given  Patient transferred to: PACU    Handoff Report: Identifed the Patient, Identified the Reponsible Provider, Reviewed the pertinent medical history, Discussed the surgical course, Reviewed Intra-OP anesthesia mangement and issues during anesthesia, Set expectations for post-procedure period and Allowed opportunity for questions and acknowledgement of understanding      Vitals:  Vitals Value Taken Time   /81 02/16/23 1130   Temp     Pulse 60 02/16/23 1133   Resp 24 02/16/23 1133   SpO2 99 % 02/16/23 1133   Vitals shown include unvalidated device data.    Electronically Signed By: NIALL Tipton CRNA  February 16, 2023  11:34 AM

## 2023-02-16 NOTE — ANESTHESIA PROCEDURE NOTES
Airway       Patient location during procedure: OR       Procedure Start/Stop Times: 2/16/2023 7:31 AM  Staff -        Anesthesiologist:  Nj Casillas MD       CRNA: Ingrid Cuello APRN CRNA       Performed By: CRNA  Consent for Airway        Urgency: elective  Indications and Patient Condition       Indications for airway management: faisal-procedural       Induction type:intravenous       Mask difficulty assessment: 1 - vent by mask    Final Airway Details       Final airway type: endotracheal airway       Successful airway: ETT - single and Oral  Endotracheal Airway Details        ETT size (mm): 8.0       Cuffed: yes       Successful intubation technique: video laryngoscopy       VL Blade Size: Glidescope 4       Grade View of Cords: 1       Adjucts: stylet       Position: Right       Measured from: gums/teeth       Secured at (cm): 22       Bite block used: None    Post intubation assessment        Placement verified by: capnometry, equal breath sounds and chest rise        Number of attempts at approach: 1       Number of other approaches attempted: 0       Secured with: pink tape       Ease of procedure: easy       Dentition: Intact and Unchanged    Medication(s) Administered   Medication Administration Time: 2/16/2023 7:31 AM      
Yes

## 2023-02-16 NOTE — PROVIDER NOTIFICATION
Dr Casillas notified regarding glucose: 223.  Verbal order for 4 units Regular IV insulin to be given.  Will update MD with recheck value.

## 2023-02-16 NOTE — PROVIDER NOTIFICATION
Dr Peace updated that patient has not stopped Asprin prior to surgery.  Ok to proceed and give heparin subcutaneous.

## 2023-02-16 NOTE — ANESTHESIA POSTPROCEDURE EVALUATION
Patient: Travis Villa    Procedure: Procedure(s):  Laparoscopic Right Hemicolectomy and skaggs exchange  UMBILICAL HERNIA REPAIR       Anesthesia Type:  General    Note:     Postop Pain Control: Uneventful            Sign Out: Well controlled pain   PONV: No   Neuro/Psych: Uneventful            Sign Out: Acceptable/Baseline neuro status   Airway/Respiratory: Uneventful            Sign Out: Acceptable/Baseline resp. status   CV/Hemodynamics: Uneventful            Sign Out: Acceptable CV status; No obvious hypovolemia; No obvious fluid overload   Other NRE: NONE   DID A NON-ROUTINE EVENT OCCUR? No           Last vitals:  Vitals Value Taken Time   /69 02/16/23 1400   Temp 36.1  C (97  F) 02/16/23 1250   Pulse 63 02/16/23 1404   Resp 16 02/16/23 1404   SpO2 95 % 02/16/23 1404   Vitals shown include unvalidated device data.    Electronically Signed By: Nj Casillas MD  February 16, 2023  2:05 PM

## 2023-02-17 LAB
ANION GAP SERPL CALCULATED.3IONS-SCNC: 10 MMOL/L (ref 7–15)
BUN SERPL-MCNC: 6.7 MG/DL (ref 8–23)
CALCIUM SERPL-MCNC: 8.5 MG/DL (ref 8.8–10.2)
CHLORIDE SERPL-SCNC: 92 MMOL/L (ref 98–107)
CREAT SERPL-MCNC: 0.58 MG/DL (ref 0.67–1.17)
DEPRECATED HCO3 PLAS-SCNC: 30 MMOL/L (ref 22–29)
GFR SERPL CREATININE-BSD FRML MDRD: >90 ML/MIN/1.73M2
GLUCOSE BLDC GLUCOMTR-MCNC: 150 MG/DL (ref 70–99)
GLUCOSE BLDC GLUCOMTR-MCNC: 151 MG/DL (ref 70–99)
GLUCOSE BLDC GLUCOMTR-MCNC: 155 MG/DL (ref 70–99)
GLUCOSE BLDC GLUCOMTR-MCNC: 160 MG/DL (ref 70–99)
GLUCOSE BLDC GLUCOMTR-MCNC: 161 MG/DL (ref 70–99)
GLUCOSE BLDC GLUCOMTR-MCNC: 165 MG/DL (ref 70–99)
GLUCOSE SERPL-MCNC: 149 MG/DL (ref 70–99)
HGB BLD-MCNC: 11.8 G/DL (ref 13.3–17.7)
POTASSIUM SERPL-SCNC: 3.6 MMOL/L (ref 3.4–5.3)
SODIUM SERPL-SCNC: 132 MMOL/L (ref 136–145)

## 2023-02-17 PROCEDURE — 250N000013 HC RX MED GY IP 250 OP 250 PS 637: Performed by: PHYSICIAN ASSISTANT

## 2023-02-17 PROCEDURE — 120N000001 HC R&B MED SURG/OB

## 2023-02-17 PROCEDURE — C9113 INJ PANTOPRAZOLE SODIUM, VIA: HCPCS | Performed by: COLON & RECTAL SURGERY

## 2023-02-17 PROCEDURE — 250N000012 HC RX MED GY IP 250 OP 636 PS 637: Performed by: PHYSICIAN ASSISTANT

## 2023-02-17 PROCEDURE — 250N000011 HC RX IP 250 OP 636: Performed by: COLON & RECTAL SURGERY

## 2023-02-17 PROCEDURE — 80048 BASIC METABOLIC PNL TOTAL CA: CPT | Performed by: COLON & RECTAL SURGERY

## 2023-02-17 PROCEDURE — 99232 SBSQ HOSP IP/OBS MODERATE 35: CPT | Performed by: PHYSICIAN ASSISTANT

## 2023-02-17 PROCEDURE — 250N000013 HC RX MED GY IP 250 OP 250 PS 637

## 2023-02-17 PROCEDURE — 250N000013 HC RX MED GY IP 250 OP 250 PS 637: Performed by: COLON & RECTAL SURGERY

## 2023-02-17 PROCEDURE — 85018 HEMOGLOBIN: CPT | Performed by: COLON & RECTAL SURGERY

## 2023-02-17 PROCEDURE — 36415 COLL VENOUS BLD VENIPUNCTURE: CPT | Performed by: COLON & RECTAL SURGERY

## 2023-02-17 RX ORDER — ASPIRIN 81 MG/1
81 TABLET ORAL 2 TIMES DAILY
COMMUNITY
Start: 2023-02-17

## 2023-02-17 RX ORDER — OXYCODONE HYDROCHLORIDE 5 MG/1
5 TABLET ORAL EVERY 6 HOURS PRN
Qty: 15 TABLET | Refills: 0 | Status: SHIPPED | OUTPATIENT
Start: 2023-02-17

## 2023-02-17 RX ADMIN — CARVEDILOL 25 MG: 25 TABLET, FILM COATED ORAL at 18:16

## 2023-02-17 RX ADMIN — OXYCODONE HYDROCHLORIDE 10 MG: 5 TABLET ORAL at 00:49

## 2023-02-17 RX ADMIN — CARVEDILOL 25 MG: 25 TABLET, FILM COATED ORAL at 08:27

## 2023-02-17 RX ADMIN — LISINOPRIL 40 MG: 40 TABLET ORAL at 20:46

## 2023-02-17 RX ADMIN — ENOXAPARIN SODIUM 40 MG: 40 INJECTION SUBCUTANEOUS at 10:35

## 2023-02-17 RX ADMIN — PANTOPRAZOLE SODIUM 40 MG: 40 INJECTION, POWDER, FOR SOLUTION INTRAVENOUS at 08:28

## 2023-02-17 RX ADMIN — TAMSULOSIN HYDROCHLORIDE 0.4 MG: 0.4 CAPSULE ORAL at 20:46

## 2023-02-17 RX ADMIN — AMLODIPINE BESYLATE 2.5 MG: 2.5 TABLET ORAL at 08:27

## 2023-02-17 RX ADMIN — AMLODIPINE BESYLATE 2.5 MG: 2.5 TABLET ORAL at 20:45

## 2023-02-17 RX ADMIN — OXYCODONE HYDROCHLORIDE 5 MG: 5 TABLET ORAL at 10:35

## 2023-02-17 RX ADMIN — ACETAMINOPHEN 975 MG: 325 TABLET, FILM COATED ORAL at 06:17

## 2023-02-17 RX ADMIN — OXYCODONE HYDROCHLORIDE 5 MG: 5 TABLET ORAL at 06:17

## 2023-02-17 RX ADMIN — INSULIN ASPART 1 UNITS: 100 INJECTION, SOLUTION INTRAVENOUS; SUBCUTANEOUS at 18:16

## 2023-02-17 RX ADMIN — ACETAMINOPHEN 975 MG: 325 TABLET, FILM COATED ORAL at 15:20

## 2023-02-17 ASSESSMENT — ACTIVITIES OF DAILY LIVING (ADL)
ADLS_ACUITY_SCORE: 26
ADLS_ACUITY_SCORE: 20

## 2023-02-17 NOTE — PLAN OF CARE
Goal Outcome Evaluation:  Orientation/Cognitive: A&Ox4  Mobility Level/Assist Equipment: Not OOB, pt states he will get OOB later this morning when encouraged to stand at bedside   .   Fall Risk (Y/N): Y  Behavior Concerns: None, pleasant  Pain Management: Oxycodone for incisional pain  Tele/VS/O2: VSS on 2L NC overnight  ABNL Lab/BG:   Diet:CLD, good oral fluid intake  Bowel/Bladder: Continent, skaggs in place with tea colored urine, denies nausea  Skin Concerns: 3 abd lap sites with liquid bandage  Drains/Devices: Skaggs, L PIV infusing LR  Tests/Procedures for next shift: AM labs  Anticipated DC date & active delays:TBD

## 2023-02-17 NOTE — PROGRESS NOTES
"Colon & Rectal Surgery Progress Note             Interval History:   Postop Day #: 1 lap right and hernia repair  heis having some heartburn, no nausea, up once this morning.voiding well in his chronic skaggs.                Medications:   I have reviewed this patient's current medications               Physical Exam:   Blood pressure 123/58, pulse 76, temperature 98  F (36.7  C), temperature source Oral, resp. rate 16, height 1.88 m (6' 2\"), weight 120.4 kg (265 lb 6.9 oz), SpO2 96 %.    Intake/Output Summary (Last 24 hours) at 2/17/2023 0713  Last data filed at 2/17/2023 0700  Gross per 24 hour   Intake 2880 ml   Output 1870 ml   Net 1010 ml     GEN:  alert  ABD:  Round, soft, incisions CDI, there is slight stretch terri on the right inferior aspect of the umbilical skin, nosigns of infection         Data:        Lab Results   Component Value Date     02/27/2019    Lab Results   Component Value Date    CHLORIDE 96 02/27/2019    Lab Results   Component Value Date    BUN 14 02/27/2019      Lab Results   Component Value Date    POTASSIUM 3.5 02/16/2023    POTASSIUM 4.0 02/27/2019    Lab Results   Component Value Date    CO2 30 02/27/2019    Lab Results   Component Value Date    CR 0.57 02/16/2023    CR 0.5 01/24/2023    CR 0.46 02/27/2019    CR 0.75 12/06/2017        Lab Results   Component Value Date    HGB 14.3 02/27/2019    HGB 14.8 12/29/2012     Lab Results   Component Value Date     02/27/2019     12/29/2012     Lab Results   Component Value Date    WBC 7.4 02/27/2019    WBC 5.4 12/29/2012            Assessment and Plan:   Doing well POD #1  Advance to full liquids, SLIV.  Start protonix.  Encourage ambulation, Lovenox now but will not need at discharge.  Likely discharge over the weekend if doing well.  Appreciate hospitalist management of multiple comorbidity including diabetes, cardiac issues, aortic stenosis.  Patient will discharge with skaggs. Will need a new leg bag.     Ingrid Peace, " MD  [facility]  Office Phone # [phone]

## 2023-02-17 NOTE — PROGRESS NOTES
[facility]    Medicine Progress Note - Hospitalist Service    Date of Admission:  2/16/2023    Assessment & Plan   Travis Villa is a 77 year old male with PMHx of obstructing cecal polyp, CAD s/p stenting 2008, HTN, Aortic stenosis, DM2, BPH with elevated PSA levels, urinary retention with chronic indwelling skaggs catheter, chronic pain on chronic narcotics, GERD, OA with cervical and lumbar DDD, obesity, and hx of SCC who underwent an elective right hemicolectomy.   Admitted on 2/16/2023. Hospitalist service consulted for management of chronic medical conditions in the post-op period. Pre-op H&P and Op notes reviewed.       Obstructing cecal polyp s/p right hemicolectomy (2/16/23): Surgery was performed under general anesthesia with an EBL documented at 20 ml.  Pre-op Hgb 12.7 on 1/30/23 .  - Defer routine post-op cares to primary team, Colorectal Surgery   - Treated with periop Ancef and Flagyl   - DVT prophylaxis with Lovenox     - Analgesic regimen with tylenol 975 mg q8 hrs, oxycodone 5-10 mg q4 hrs PRN, IV dilaudid 0.2-0.4 mg q2 hrs PRN   - Encourage utilization of incentive spirometer.     Recent Labs   Lab 02/17/23  0645   HGB 11.8*      CAD s/p PCI of circumflex (2008)  HTN: Follows with Dr. Valdez of Cardiology, last visit 11/29/22.   * Lexiscan 2020 negative for inducible ischemia  - Defer resumption of PTA ASA 81 mg BID  to Colorectal Surgery.    - Continue Coreg 25 mg BID with hold parameters   - Continue PTA amlodipine 2.5 mg BID with hold parameters  - Continue PTA ramipril 10 mg BID   - Hold PTA indapamide 1.25 mg every morning, resume 2/18 pending BP trends   - PRN IV hydralazine available for SBP > 180.    - Check BMP in the morning.      Mild hypochloremic hyponatremia: Na 132, Chloride 92. Sodium 139 when checked 1/30/23, note hyponatremia of 131 in 9/2022. No culprit meds. IVF with LR throughout stay, since discontinued.  * Corrected sodium for hyperglycemia is 133    - Repeat BMP in the AM       Aortic stenosis, moderate: Noted.   - Monitor for signs of volume overload     DM2  Hyperglycemia  Recent A1c of 7.2% from 1/30/2023.  Glucose in the PACU reported at 223 and was administered 4 units Regular insulin.    - Hold PTA metformin 500 mg BID.    - Medium intensity sliding scale insulin.    - Glucose checks.    - Hypoglycemic protocol.      Recent Labs   Lab 02/17/23  0847 02/17/23  0645 02/17/23  0628 02/17/23  0237 02/16/23  2220 02/16/23  1717   * 149* 160* 150* 141* 181*      BPH with elevated PSA levels  Urinary retention with chronic indwelling Skaggs Catheter  - Continue with indwelling Skaggs Catheter which was exchanged in the OR  - Continue PTA Flomax 0.4 mg every evening.    - Follow up with Urologist, plans for prostate procedure in the near future     Chronic low back pain on chronic narcotics  OA with cervical and lumbar DDD  - Analgesic management per Colorectal Surgery.    - Plans for back surgery at some point, follow-up with outpatient Orthopedist      GERD  Not currently on any medications.    - Monitor.       Obesity   Body mass index is 33.51 kg/m .  Increase in all-cause morbidity and mortality.   - Follow up with PCP regarding ongoing management.    - Monitor O2 saturations.       History of SCC  No interventions.       Diet: Full Liquid Diet  Diet    DVT Prophylaxis: Enoxaparin (Lovenox) SQ  Skaggs Catheter: PRESENT, indication: Retention (also enlargerd prostrate), /GI/GYN Pelvic Procedure  Lines: None     Cardiac Monitoring: None  Code Status: Full Code      Disposition Plan      Expected Discharge Date: 02/18/2023,  3:00 PM    Destination: home with family  Discharge Comments: Radha skaggs        The patient's care was discussed with the Attending Physician, Dr. Garcia, Patient and Patient's Family.    Joann Morillo PA-C  Hospitalist Service  [facility]  Securely message with Vocera (more  info)  Text page via [facility] Paging/Directory   ______________________________________________________________________    Interval History   Pain manageable today. No nausea. Abdomen distended. Not passing gas. No BM. Good urine output. No acute concerns.     Physical Exam   Vital Signs: Temp: 99.3  F (37.4  C) Temp src: Oral BP: 118/55 Pulse: 69   Resp: 16 SpO2: 94 % O2 Device: None (Room air) Oxygen Delivery: 2 LPM  Weight: 265 lbs 6.94 oz    CONSTITUTIONAL: Pt laying in bed, dressed in hospital garb. Appears comfortable. Cooperative with interview. Accompanied by wife at bedside.   HEENT: Normocephalic, atraumatic.   CARDIOVASCULAR: RRR, 2+ systolic murmur best heard in aortic region. No rubs or extra heart sounds appreciated. Pulses +2/4 and regular in upper and lower extremities, bilaterally.   RESPIRATORY: No increased work of breathing.  CTA, bilat; no wheezes, rales, or rhonchi appreciated.  GASTROINTESTINAL:  Abdomen soft, non-distended. BS hypoactive. Minimal incisional tenderness.     MUSCULOSKELETAL: No gross deformities noted. Normal muscle tone.   HEMATOLOGIC/LYMPHATIC/IMMUNOLOGIC: Negative for lower extremity edema, bilaterally.  NEUROLOGIC: Alert and oriented to person, place, and time.  strength intact. No focal neuro deficits.   SKIN: Warm, dry, intact. Lap incision sites C/D/I, small sore from suspected dressing removal at inferior, left side of umbilical incision; no surrounding erythema.   : Marrufo with straw colored urine.      Medical Decision Making       45 MINUTES SPENT BY ME on the date of service doing chart review, history, exam, documentation & further activities per the note.      Data     I have personally reviewed the following data over the past 24 hrs:    N/A  \   11.8 (L)   / N/A     132 (L) 92 (L) 6.7 (L) /  155 (H)   3.6 30 (H) 0.58 (L) \       Procal: N/A CRP: N/A Lactic Acid: 0.8         Imaging results reviewed over the past 24 hrs:   No results found for this or any  previous visit (from the past 24 hour(s)).

## 2023-02-17 NOTE — PLAN OF CARE
Goal Outcome Evaluation:      Plan of Care Reviewed With: patient    Overall Patient Progress: improvingOverall Patient Progress: improving     POD 0 laprascopic R hemicolectomy umbilical hernia repair and skaggs exchange.  A/ o x4.  C/o pain to abdomen at 7 prn oxycodone, ice, and scheduled tylenol given.  Abdominal binder in place.  3 lap sites with liquid bandage.  Tolerating clears.  Skaggs wit pink urine output.  Blood glucose monitoring with sliding scale insulin.  Bowel sounds hypo.  Discharge pending clinical improvement.

## 2023-02-17 NOTE — OP NOTE
Procedure Date: 02/16/2023    PREOPERATIVE DIAGNOSIS:  Endoscopically unresectable right colon polyp and large umbilical hernia.    POSTOPERATIVE DIAGNOSIS:  Endoscopically unresectable right colon polyp and large umbilical hernia.    PROCEDURE:  Laparoscopic-assisted right hemicolectomy and primary hernia repair.    SURGEON:  Ingrid Peace M.D.     ASSISTANT:  Danni Sherman, physician's assistant certified.    INDICATIONS:  Lele is a 77-year-old man who underwent a colonoscopy and was found to have a large polyp in the cecum, extending into the ascending colon.  This was biopsied and showed adenoma.  Given that it was endoscopically unresectable, he was recommended for resection.  We reviewed in the office of bleeding, infection, anastomotic leak, and other risks associated with major abdominal surgery and general anesthesia including but not limited to DVT, PE, heart attack, stroke, other cardiopulmonary events, and even death.  He understood and wished to proceed.    PROCEDURE IN DETAIL:  After informed consent was obtained, the patient was taken to the operating room, positioned on the operating room table in the supine position and was intubated.  He had an indwelling Marrufo catheter that had been there several months due to prostatic hypertrophy, and this was exchanged without difficulty.  The left arm was padded and tucked at his side, and he was secured to the bed with straps on his legs and tape across the chest.  Tilt testing showed he was secure.  The abdomen was shaved, prepped and draped in the usual fashion.  After appropriate timeout, we began by making a stab wound in the left upper quadrant and a Veress needle was advanced without difficulty.  The water drop test indicated we were within the peritoneal cavity, and the abdomen was insufflated to a pressure of 15 without difficulty.  We then made an incision in the left upper quadrant where we intended to put our 5 mm port and used a 5-0 scope enter under  direct vision.  This proceeded without difficulty. Inspecting within the abdomen  we did not see any injury from our Veress needle nor was there any evidence of peritoneal disease. There was some omental adhesions to the anterior abdominal wall, and an additional 5 mm port was placed in the left lower quadrant.  With this, a TAP block was performed injecting 7.5 mL into each of the four quadrants.  With these two ports, we were able to inspect and see that there was no bowel stuck, and using the LigaSure, the omentum was taken down.  He continued to have a very large hernia externally; however, this seemed to be preperitoneal fat extending into a very small fascial defect.  This was not in our way for the surgery.  We placed a 10 mm Jean-Paul port above the umbilicus.  We then tilted the patient in Trendelenburg with the right side up and tried to elevate the mesentery, which was very thickened.  Given this, we turned our attention laterally and incised the lateral attachments inferiorly, laterally and then up around the hepatic flexure.  The duodenum was identified and the omentum was taken off of the transverse colon.  We came back down around and incised the attachments off the retroperitoneum until the mesentery could be held up more easily.  Although there was no clear bare area, it was much thinner now and we made a window inferior to the mesentery medially and thinned out the vascular pedicle.  A window was made cephalad so that we were fully around the ileocolic vessels.  These were then divided after numerous fires of the LigaSure and was nicely hemostatic.  We elected to place an Endoloop around there for reassurance.  A 0 Vicryl Endoloop was placed without difficulty around the vascular pedicle, again ensuring hemostasis.  We then used the LigaSure to divide the mesentery towards the intended site of division of the small bowel and up towards the transverse colon.  Having done so, we had full mobilization  and placed a locking grasper on the appendix.  We then neutralized the position and opened a roughly 4 cm incision incorporating our supraumbilical Jean-Paul port and extending a bit inferior to incorporate the fascial defect of the hernia.  At this point, we took the hernia contents out, which ended up being roughly 4 cm preperitoneal ball of fat.  This was cleared off circumferentially and the fascial edge was identified.  A 5-9 Tommy was placed and we were able to easily deliver out the specimen.  The additional mesentery was divided using the LigaSure both at the small bowel and the transverse colon side.  We lied a side-to-side functional end-to-end anastomosis and the common channel was created with a SHERIE-75 blue load.  Inspection revealed hemostasis within the bowel lumen.  The staple lines were then offset and held together with Allis clamps, and the common enterotomy was closed with a TA-90.  The corners, crotch and crossing staple line were oversewn.  We dropped the anastomosis back into the abdomen and switched to clean gloves.  We suction irrigated and found the effluent to be clear and inspected the vascular pedicle, which was nicely hemostatic.  We then switched to a clean closure tray and reapproximated the fascia using one PDS.  This came together nicely without undue tension.  We then used Vicryl to tack down the umbilicus, which had been quite redundant due to his hernia.  The skin edges were then reapproximated using 4-0 Monocryl and Exofin.    ESTIMATED BLOOD LOSS:  Less than 20 mL.    COMPLICATIONS:  No immediate complications.    SPECIMENS:  The hernia contents, and the terminal ileum, right colon and appendix.  We did open the specimen on the back field and there was a mucosal abnormality in the cecum and extending on to the valve without obvious visible indication of cancer.      Ingrid Peace MD        D: 02/17/2023   T: 02/17/2023   MT: STEPHON    Name:     CATRACHO MIKE  MRN:       [id number]        Account:        [id number]   :      1945           Procedure Date: 2023     Document: [id number]    cc:  MD Ingrid Aranda MD

## 2023-02-18 LAB
ANION GAP SERPL CALCULATED.3IONS-SCNC: 7 MMOL/L (ref 7–15)
BUN SERPL-MCNC: 14.8 MG/DL (ref 8–23)
CALCIUM SERPL-MCNC: 8.3 MG/DL (ref 8.8–10.2)
CHLORIDE SERPL-SCNC: 90 MMOL/L (ref 98–107)
CREAT SERPL-MCNC: 0.8 MG/DL (ref 0.67–1.17)
DEPRECATED HCO3 PLAS-SCNC: 31 MMOL/L (ref 22–29)
GFR SERPL CREATININE-BSD FRML MDRD: 90 ML/MIN/1.73M2
GLUCOSE BLDC GLUCOMTR-MCNC: 150 MG/DL (ref 70–99)
GLUCOSE BLDC GLUCOMTR-MCNC: 158 MG/DL (ref 70–99)
GLUCOSE BLDC GLUCOMTR-MCNC: 174 MG/DL (ref 70–99)
GLUCOSE BLDC GLUCOMTR-MCNC: 206 MG/DL (ref 70–99)
GLUCOSE BLDC GLUCOMTR-MCNC: 215 MG/DL (ref 70–99)
GLUCOSE SERPL-MCNC: 145 MG/DL (ref 70–99)
OSMOLALITY UR: 232 MMOL/KG (ref 100–1200)
PLATELET # BLD AUTO: 161 10E3/UL (ref 150–450)
POTASSIUM SERPL-SCNC: 3.5 MMOL/L (ref 3.4–5.3)
SODIUM SERPL-SCNC: 128 MMOL/L (ref 136–145)
SODIUM UR-SCNC: <20 MMOL/L

## 2023-02-18 PROCEDURE — 84300 ASSAY OF URINE SODIUM: CPT | Performed by: STUDENT IN AN ORGANIZED HEALTH CARE EDUCATION/TRAINING PROGRAM

## 2023-02-18 PROCEDURE — C9113 INJ PANTOPRAZOLE SODIUM, VIA: HCPCS | Performed by: COLON & RECTAL SURGERY

## 2023-02-18 PROCEDURE — 250N000013 HC RX MED GY IP 250 OP 250 PS 637

## 2023-02-18 PROCEDURE — 250N000013 HC RX MED GY IP 250 OP 250 PS 637: Performed by: PHYSICIAN ASSISTANT

## 2023-02-18 PROCEDURE — 85049 AUTOMATED PLATELET COUNT: CPT | Performed by: COLON & RECTAL SURGERY

## 2023-02-18 PROCEDURE — 82310 ASSAY OF CALCIUM: CPT | Performed by: PHYSICIAN ASSISTANT

## 2023-02-18 PROCEDURE — 258N000003 HC RX IP 258 OP 636: Performed by: STUDENT IN AN ORGANIZED HEALTH CARE EDUCATION/TRAINING PROGRAM

## 2023-02-18 PROCEDURE — 250N000011 HC RX IP 250 OP 636: Performed by: COLON & RECTAL SURGERY

## 2023-02-18 PROCEDURE — 83935 ASSAY OF URINE OSMOLALITY: CPT | Performed by: STUDENT IN AN ORGANIZED HEALTH CARE EDUCATION/TRAINING PROGRAM

## 2023-02-18 PROCEDURE — 99232 SBSQ HOSP IP/OBS MODERATE 35: CPT | Performed by: STUDENT IN AN ORGANIZED HEALTH CARE EDUCATION/TRAINING PROGRAM

## 2023-02-18 PROCEDURE — 250N000013 HC RX MED GY IP 250 OP 250 PS 637: Performed by: COLON & RECTAL SURGERY

## 2023-02-18 PROCEDURE — 120N000001 HC R&B MED SURG/OB

## 2023-02-18 PROCEDURE — 36415 COLL VENOUS BLD VENIPUNCTURE: CPT | Performed by: COLON & RECTAL SURGERY

## 2023-02-18 RX ADMIN — PANTOPRAZOLE SODIUM 40 MG: 40 INJECTION, POWDER, FOR SOLUTION INTRAVENOUS at 09:28

## 2023-02-18 RX ADMIN — ENOXAPARIN SODIUM 40 MG: 40 INJECTION SUBCUTANEOUS at 09:33

## 2023-02-18 RX ADMIN — ACETAMINOPHEN 975 MG: 325 TABLET, FILM COATED ORAL at 15:44

## 2023-02-18 RX ADMIN — INSULIN ASPART 1 UNITS: 100 INJECTION, SOLUTION INTRAVENOUS; SUBCUTANEOUS at 13:43

## 2023-02-18 RX ADMIN — SODIUM CHLORIDE 1000 ML: 9 INJECTION, SOLUTION INTRAVENOUS at 22:31

## 2023-02-18 RX ADMIN — CARVEDILOL 25 MG: 25 TABLET, FILM COATED ORAL at 18:03

## 2023-02-18 RX ADMIN — ACETAMINOPHEN 975 MG: 325 TABLET, FILM COATED ORAL at 06:04

## 2023-02-18 RX ADMIN — CARVEDILOL 25 MG: 25 TABLET, FILM COATED ORAL at 09:28

## 2023-02-18 RX ADMIN — ACETAMINOPHEN 975 MG: 325 TABLET, FILM COATED ORAL at 22:03

## 2023-02-18 RX ADMIN — LISINOPRIL 40 MG: 40 TABLET ORAL at 19:23

## 2023-02-18 RX ADMIN — TAMSULOSIN HYDROCHLORIDE 0.4 MG: 0.4 CAPSULE ORAL at 19:23

## 2023-02-18 RX ADMIN — INSULIN ASPART 2 UNITS: 100 INJECTION, SOLUTION INTRAVENOUS; SUBCUTANEOUS at 18:03

## 2023-02-18 RX ADMIN — INSULIN ASPART 1 UNITS: 100 INJECTION, SOLUTION INTRAVENOUS; SUBCUTANEOUS at 09:42

## 2023-02-18 ASSESSMENT — ACTIVITIES OF DAILY LIVING (ADL)
ADLS_ACUITY_SCORE: 28
ADLS_ACUITY_SCORE: 28
ADLS_ACUITY_SCORE: 26
ADLS_ACUITY_SCORE: 28
ADLS_ACUITY_SCORE: 28
ADLS_ACUITY_SCORE: 26
ADLS_ACUITY_SCORE: 28
ADLS_ACUITY_SCORE: 26

## 2023-02-18 NOTE — PLAN OF CARE
POD 1 laprascopic R hemicolectomy umbilical hernia repair and skaggs exchange.  A/ o x4.  VSS but slight hypotension.  Heart murmur.  Pain controlled with PO oxycodone x1 today and scheduled tylenol.   Abdominal binder in place.  3 lap sites with liquid bandage.  Tolerating full liquids, plan to advance to low fiber tomorrow.  Skaggs catheter with diana urine output.  Faint bowel sounds in RLQ.  Wife at bedside today.  Discharge to home likely Saturday or Sunday with skaggs catheter.  Nursing will continue to monitor.

## 2023-02-18 NOTE — PROGRESS NOTES
"Colon & Rectal Surgery Progress Note             Interval History:   Postop Day #: 2 lap right  Doing well. + bm and flatus  Up walking, pain controlled. Tolerating low fiber. No nausea.                Medications:   I have reviewed this patient's current medications               Physical Exam:   Blood pressure 121/62, pulse 68, temperature 99.7  F (37.6  C), temperature source Oral, resp. rate 18, height 1.88 m (6' 2\"), weight 121.5 kg (267 lb 13.7 oz), SpO2 94 %.    Intake/Output Summary (Last 24 hours) at 2/18/2023 0912  Last data filed at 2/18/2023 0600  Gross per 24 hour   Intake 980 ml   Output 675 ml   Net 305 ml     GEN:  alert  ABD:  Round, soft, incisions CDI.          Data:        Lab Results   Component Value Date     02/18/2023     02/27/2019    Lab Results   Component Value Date    CHLORIDE 90 02/18/2023    CHLORIDE 96 02/27/2019    Lab Results   Component Value Date    BUN 14.8 02/18/2023    BUN 14 02/27/2019      Lab Results   Component Value Date    POTASSIUM 3.5 02/18/2023    POTASSIUM 4.0 02/27/2019    Lab Results   Component Value Date    CO2 31 02/18/2023    CO2 30 02/27/2019    Lab Results   Component Value Date    CR 0.80 02/18/2023    CR 0.58 02/17/2023    CR 0.46 02/27/2019    CR 0.75 12/06/2017        Lab Results   Component Value Date    HGB 11.8 (L) 02/17/2023    HGB 14.3 02/27/2019     Lab Results   Component Value Date     02/18/2023     02/27/2019     Lab Results   Component Value Date    WBC 7.4 02/27/2019    WBC 5.4 12/29/2012            Assessment and Plan:   Doing well  Possible discharge home later today if doing well with mobility, diet and pain.      Ingrid Peace MD  [facility]  Office Phone # [phone]    "

## 2023-02-18 NOTE — PLAN OF CARE
POD 2 laprascopic R hemicolectomy umbilical hernia repair and skaggs exchange. A&Ox4, VSS on RA, heart murmur. PIV SL. Tolerating full liquids, will advanced to low fiber diet this morning. Skaggs in place w/ diana output. 3 lap sites CDI w/ liquid bandage, abd binder in place. Pain managed w/ scheduled tylenol. Discharge pending to home.

## 2023-02-18 NOTE — PLAN OF CARE
Goal Outcome Evaluation:    Patient A&Ox4. VSS on Rm Air. C/o generalized abd pain; denied intervention. Requested abd binder for comfort. Lap sites WNL. BS audible, normoactive. Chronic skaggs; tea-colored urine OP. Patient to discharge w/ skaggs when medically stable. BG AC & HS. Asstx1 w/ GB/W. PIV SL to L hand. Low fiber diet; tolerating well. Urine sodium checked; see results. Plan for patient to discharge home tomorrow.

## 2023-02-18 NOTE — PROGRESS NOTES
[facility]    Medicine Progress Note - Hospitalist Service    Date of Admission:  2/16/2023    Assessment & Plan   Travis Villa is a 77 year old male with PMHx of obstructing cecal polyp, CAD s/p stenting 2008, HTN, Aortic stenosis, DM2, BPH with elevated PSA levels, urinary retention with chronic indwelling skaggs catheter, chronic pain on chronic narcotics, GERD, OA with cervical and lumbar DDD, obesity, and hx of SCC who underwent an elective right hemicolectomy.   Admitted on 2/16/2023. Hospitalist service consulted for management of chronic medical conditions in the post-op period. Pre-op H&P and Op notes reviewed.       Obstructing cecal polyp s/p right hemicolectomy (2/16/23): Surgery was performed under general anesthesia with an EBL documented at 20 ml.  Pre-op Hgb 12.7 on 1/30/23 .  - Defer routine post-op cares to primary team, Colorectal Surgery      CAD s/p PCI of circumflex (2008)  HTN: Follows with Dr. Valdez of Cardiology, last visit 11/29/22.   * Lexiscan 2020 negative for inducible ischemia  - Defer resumption of PTA ASA 81 mg BID  to Colorectal Surgery.      - will recommend patient hold amlodipine and his thiazide for lower BP readings since admission with low Na. He can discuss resumption of this in outpatient setting with PCP vs cardiologist. Orders placed and patient updated on this plan. Otherwise resume other home meds on discharge.    Mild hypochloremic hyponatremia: Na 132, Chloride 92. Sodium 139 when checked 1/30/23, note hyponatremia of 131 in 9/2022.     - Na lower today at 128. Unclear etiology. Home thiazide has been held since admission  - will obtain urine Na and osmols today  - recommend to keep in hospital to follow Na level tomorrow      Aortic stenosis, moderate: Noted.   - Monitor for signs of volume overload     DM2  Hyperglycemia  Recent A1c of 7.2% from 1/30/2023.  Glucose in the PACU reported at 223 and was administered 4 units Regular insulin.     - Hold PTA metformin 500 mg BID.    - Medium intensity sliding scale insulin while in the hospital    BPH with elevated PSA levels  Urinary retention with chronic indwelling Marrufo Catheter  - Continue with indwelling Marrufo Catheter which was exchanged in the OR  - Continue PTA Flomax 0.4 mg every evening.    - Follow up with Urologist, plans for prostate procedure in the near future     Chronic low back pain on chronic narcotics  OA with cervical and lumbar DDD  - Analgesic management per Colorectal Surgery.    - Plans for back surgery at some point, follow-up with outpatient Orthopedist      GERD  Not currently on any medications.    - Monitor.       Obesity   Body mass index is 33.51 kg/m .  Increase in all-cause morbidity and mortality.   - Follow up with PCP regarding ongoing management.    - Monitor O2 saturations.       History of SCC  No interventions.       Diet: Diet  Low Fiber Diet    DVT Prophylaxis: Enoxaparin (Lovenox) SQ  Marrufo Catheter: PRESENT, indication: Retention (also enlargerd prostrate), /GI/GYN Pelvic Procedure  Lines: None     Cardiac Monitoring: None  Code Status: Full Code      Disposition Plan     Expected Discharge Date: 02/18/2023,  3:00 PM    Destination: home with family  Discharge Comments: Radha Garcia DO  Hospitalist Service  [facility]  Securely message with Razmir (more info)  Text page via [employer] Paging/Directory   ______________________________________________________________________    Interval History   Patient not feeling very well today. He denies much pain but just feels weak. He would prefer to stay here if possible. He was updated on plan for BP meds on discharge. Spoke with nursing to update on plan to keep here today    Physical Exam   Vital Signs: Temp: 99.7  F (37.6  C) Temp src: Oral BP: 132/60 Pulse: 71   Resp: 18 SpO2: 94 % O2 Device: None (Room air)    Weight: 267 lbs 13.74 oz    Constitutional: Awake, alert,  cooperative, no apparent distress  Skin/Integumen: No rashes, no cyanosis on exposed skin  MSK; no joint swelling  Neuro: moving all extremities    Medical Decision Making       45 MINUTES SPENT BY ME on the date of service doing chart review, history, exam, documentation & further activities per the note.      Data     I have personally reviewed the following data over the past 24 hrs:    N/A  \   N/A   / 161     128 (L) 90 (L) 14.8 /  150 (H)   3.5 31 (H) 0.80 \       Imaging results reviewed over the past 24 hrs:   No results found for this or any previous visit (from the past 24 hour(s)).

## 2023-02-19 VITALS
SYSTOLIC BLOOD PRESSURE: 146 MMHG | BODY MASS INDEX: 34.75 KG/M2 | RESPIRATION RATE: 16 BRPM | HEART RATE: 83 BPM | TEMPERATURE: 98.6 F | DIASTOLIC BLOOD PRESSURE: 70 MMHG | HEIGHT: 74 IN | OXYGEN SATURATION: 93 % | WEIGHT: 270.8 LBS

## 2023-02-19 LAB
ANION GAP SERPL CALCULATED.3IONS-SCNC: 7 MMOL/L (ref 7–15)
BUN SERPL-MCNC: 13.9 MG/DL (ref 8–23)
CALCIUM SERPL-MCNC: 8.4 MG/DL (ref 8.8–10.2)
CHLORIDE SERPL-SCNC: 94 MMOL/L (ref 98–107)
CREAT SERPL-MCNC: 0.6 MG/DL (ref 0.67–1.17)
DEPRECATED HCO3 PLAS-SCNC: 29 MMOL/L (ref 22–29)
GFR SERPL CREATININE-BSD FRML MDRD: >90 ML/MIN/1.73M2
GLUCOSE BLDC GLUCOMTR-MCNC: 174 MG/DL (ref 70–99)
GLUCOSE BLDC GLUCOMTR-MCNC: 240 MG/DL (ref 70–99)
GLUCOSE SERPL-MCNC: 180 MG/DL (ref 70–99)
POTASSIUM SERPL-SCNC: 3.5 MMOL/L (ref 3.4–5.3)
SODIUM SERPL-SCNC: 130 MMOL/L (ref 136–145)

## 2023-02-19 PROCEDURE — 80048 BASIC METABOLIC PNL TOTAL CA: CPT | Performed by: STUDENT IN AN ORGANIZED HEALTH CARE EDUCATION/TRAINING PROGRAM

## 2023-02-19 PROCEDURE — 250N000013 HC RX MED GY IP 250 OP 250 PS 637: Performed by: COLON & RECTAL SURGERY

## 2023-02-19 PROCEDURE — C9113 INJ PANTOPRAZOLE SODIUM, VIA: HCPCS | Performed by: COLON & RECTAL SURGERY

## 2023-02-19 PROCEDURE — 250N000011 HC RX IP 250 OP 636: Performed by: COLON & RECTAL SURGERY

## 2023-02-19 PROCEDURE — 36415 COLL VENOUS BLD VENIPUNCTURE: CPT | Performed by: STUDENT IN AN ORGANIZED HEALTH CARE EDUCATION/TRAINING PROGRAM

## 2023-02-19 PROCEDURE — 99232 SBSQ HOSP IP/OBS MODERATE 35: CPT | Performed by: STUDENT IN AN ORGANIZED HEALTH CARE EDUCATION/TRAINING PROGRAM

## 2023-02-19 PROCEDURE — 250N000013 HC RX MED GY IP 250 OP 250 PS 637: Performed by: PHYSICIAN ASSISTANT

## 2023-02-19 RX ADMIN — OXYCODONE HYDROCHLORIDE 5 MG: 5 TABLET ORAL at 12:27

## 2023-02-19 RX ADMIN — PANTOPRAZOLE SODIUM 40 MG: 40 INJECTION, POWDER, FOR SOLUTION INTRAVENOUS at 09:11

## 2023-02-19 RX ADMIN — ENOXAPARIN SODIUM 40 MG: 40 INJECTION SUBCUTANEOUS at 09:11

## 2023-02-19 RX ADMIN — CARVEDILOL 25 MG: 25 TABLET, FILM COATED ORAL at 09:11

## 2023-02-19 RX ADMIN — INSULIN ASPART 1 UNITS: 100 INJECTION, SOLUTION INTRAVENOUS; SUBCUTANEOUS at 09:11

## 2023-02-19 RX ADMIN — ACETAMINOPHEN 975 MG: 325 TABLET, FILM COATED ORAL at 06:13

## 2023-02-19 ASSESSMENT — ACTIVITIES OF DAILY LIVING (ADL)
ADLS_ACUITY_SCORE: 28

## 2023-02-19 NOTE — PLAN OF CARE
POD# 2 laprascopic R hemicolectomy umbilical hernia repair and skaggs exchange. A&Ox4. VSS on RA. Up with 1gb and walker. PIV SL. Low fiber diet. . Denies pain.  Chronic skaggs in place. 3 lap sites CDI, abd binder in place. Possible discharge tomorrow. Colorectal following.

## 2023-02-19 NOTE — PLAN OF CARE
POD 3 laprascopic R hemicolectomy umbilical hernia repair and skaggs exchange. A&Ox4, VSS on RA. A1 GB/WK. PIV infusing bolus of a single bag NS at 100 ml/hr one time. Low fiber diet, advanced to regular diet this morning. Chronic skaggs in place w/ adequate output. 3 lap sites CDI, abd binder in place. Pt having watery stools. Denies pain and N/V. Discharge pending for today.

## 2023-02-19 NOTE — PROGRESS NOTES
[facility]    Medicine Progress Note - Hospitalist Service    Date of Admission:  2/16/2023    Assessment & Plan   Travis Villa is a 77 year old male with PMHx of obstructing cecal polyp, CAD s/p stenting 2008, HTN, Aortic stenosis, DM2, BPH with elevated PSA levels, urinary retention with chronic indwelling skaggs catheter, chronic pain on chronic narcotics, GERD, OA with cervical and lumbar DDD, obesity, and hx of SCC who underwent an elective right hemicolectomy.   Admitted on 2/16/2023. Hospitalist service consulted for management of chronic medical conditions in the post-op period. Pre-op H&P and Op notes reviewed.       Obstructing cecal polyp s/p right hemicolectomy (2/16/23): Surgery was performed under general anesthesia with an EBL documented at 20 ml.  Pre-op Hgb 12.7 on 1/30/23 .  - Defer routine post-op cares to primary team, Colorectal Surgery      CAD s/p PCI of circumflex (2008)  HTN: Follows with Dr. Valdez of Cardiology, last visit 11/29/22.   * Lexiscan 2020 negative for inducible ischemia  - Defer resumption of PTA ASA 81 mg BID  to Colorectal Surgery.      - will recommend patient hold amlodipine and his thiazide for lower BP readings since admission with low Na. He can discuss resumption of this in outpatient setting with PCP vs cardiologist. Orders placed and patient updated on this plan. Otherwise resume other home meds on discharge.    Mild hypochloremic hyponatremia: Na 132, Chloride 92. Sodium 139 when checked 1/30/23, note hyponatremia of 131 in 9/2022.     - Urine Na low yesterday and patient started on IVF. Na improved to 130 today and suspect volume depletion and GI losses contributing to his hyponatremia  - stable for discharge home with follow up BMP in 1 week       Aortic stenosis, moderate: Noted.   - Monitor for signs of volume overload     DM2  Hyperglycemia  Recent A1c of 7.2% from 1/30/2023.  Glucose in the PACU reported at 223 and was administered  4 units Regular insulin.    - Hold PTA metformin 500 mg BID.    - Medium intensity sliding scale insulin while in the hospital, resume metformin on discharge    BPH with elevated PSA levels  Urinary retention with chronic indwelling Marrufo Catheter  - Continue with indwelling Marrufo Catheter which was exchanged in the OR  - Continue PTA Flomax 0.4 mg every evening.    - Follow up with Urologist, plans for prostate procedure in the near future     Chronic low back pain on chronic narcotics  OA with cervical and lumbar DDD  - Analgesic management per Colorectal Surgery.    - Plans for back surgery at some point, follow-up with outpatient Orthopedist      GERD  Not currently on any medications.    - Monitor.       Obesity   Body mass index is 33.51 kg/m .  Increase in all-cause morbidity and mortality.   - Follow up with PCP regarding ongoing management.    - Monitor O2 saturations.       History of SCC  No interventions.       Diet: Diet  Regular Diet Adult    DVT Prophylaxis: Enoxaparin (Lovenox) SQ  Marrufo Catheter: PRESENT, indication: Retention (also enlargerd prostrate), Retention  Lines: None     Cardiac Monitoring: None  Code Status: Full Code      Disposition Plan     Expected Discharge Date: 02/19/2023,  3:00 PM    Destination: home with family  Discharge Comments: wDERICK Garcia DO  Hospitalist Service  [facility]  Securely message with Kash (more info)  Text page via [employer] Paging/Directory   ______________________________________________________________________    Interval History   Patient feeling well today and asking a lot of appropriate questions. CRS in the room during my assessment and we spoke about plan for the day. He denies pain and is eating well. Aware of need to increase his oral intake    Physical Exam   Vital Signs: Temp: 98.6  F (37  C) Temp src: Oral BP: (!) 146/70 Pulse: 83   Resp: 16 SpO2: 93 % O2 Device: None (Room air)    Weight: 270 lbs  12.8 oz    Constitutional: Awake, alert, cooperative, no apparent distress  Skin/Integumen: No rashes, no cyanosis on exposed skin  MSK; no joint swelling  Neuro: moving all extremities  GI: mildly TTP in RUQ    Medical Decision Making       45 MINUTES SPENT BY ME on the date of service doing chart review, history, exam, documentation & further activities per the note.      Data     I have personally reviewed the following data over the past 24 hrs:    N/A  \   N/A   / N/A     130 (L) 94 (L) 13.9 /  180 (H)   3.5 29 0.60 (L) \       Imaging results reviewed over the past 24 hrs:   No results found for this or any previous visit (from the past 24 hour(s)).

## 2023-02-19 NOTE — PLAN OF CARE
Pt here with post op day 3 laprascopic R hemicolectomy umbilical hernia repair and skaggs exchange. A&O X 4. Neuros intact. VSS on RA. Regular diet, thin liquids. Takes pills whole. Up stand by assist. Pain managed with PRN oxycodone. Pt scoring green on the Aggression Stop Light Tool. Pt discharged home with family. Writer educated pt on all discharge instructions and pt stated understanding. Discharge medications from discharge pharmacy sent with pt. Catheter leg bag placed on pt. Regular skaggs bag, catheter wipes and all catheter supplies sent with pt. All belongings sent with pt.  Addendum: 3 lap sites CDI, abdomen binder in place. Pt passing gas.

## 2023-02-19 NOTE — PROGRESS NOTES
"Colon & Rectal Surgery Progress Note             Interval History:   Postop Day #: 3  No nausea, up walking some. Loose stools and gas. No bleeding.                Medications:   I have reviewed this patient's current medications               Physical Exam:   Blood pressure (!) 146/70, pulse 83, temperature 98.6  F (37  C), temperature source Oral, resp. rate 16, height 1.88 m (6' 2\"), weight 122.8 kg (270 lb 12.8 oz), SpO2 93 %.    Intake/Output Summary (Last 24 hours) at 2/19/2023 0810  Last data filed at 2/19/2023 0624  Gross per 24 hour   Intake --   Output 2025 ml   Net -2025 ml     GEN:  alert  ABD:  Soft round, incisions CDI         Data:        Lab Results   Component Value Date     02/18/2023     02/27/2019    Lab Results   Component Value Date    CHLORIDE 90 02/18/2023    CHLORIDE 96 02/27/2019    Lab Results   Component Value Date    BUN 14.8 02/18/2023    BUN 14 02/27/2019      Lab Results   Component Value Date    POTASSIUM 3.5 02/18/2023    POTASSIUM 4.0 02/27/2019    Lab Results   Component Value Date    CO2 31 02/18/2023    CO2 30 02/27/2019    Lab Results   Component Value Date    CR 0.80 02/18/2023    CR 0.58 02/17/2023    CR 0.46 02/27/2019    CR 0.75 12/06/2017        Lab Results   Component Value Date    HGB 11.8 (L) 02/17/2023    HGB 14.3 02/27/2019     Lab Results   Component Value Date     02/18/2023     02/27/2019     Lab Results   Component Value Date    WBC 7.4 02/27/2019    WBC 5.4 12/29/2012            Assessment and Plan:   Doing well.  Discharge today from CRS point of view. Awaiting labs to see if hosptialist feels he is ready to go.  Low fiber diet.  Follow up with PMD in < 1 Week  Path pending.     Ingrid Peace MD  [facility]  Office Phone # [phone]    "

## 2023-02-20 LAB
PATH REPORT.COMMENTS IMP SPEC: NORMAL
PATH REPORT.COMMENTS IMP SPEC: NORMAL
PATH REPORT.FINAL DX SPEC: NORMAL
PATH REPORT.GROSS SPEC: NORMAL
PATH REPORT.MICROSCOPIC SPEC OTHER STN: NORMAL
PATH REPORT.RELEVANT HX SPEC: NORMAL
PHOTO IMAGE: NORMAL

## 2023-02-20 PROCEDURE — 88302 TISSUE EXAM BY PATHOLOGIST: CPT | Mod: 26 | Performed by: PATHOLOGY

## 2023-02-20 PROCEDURE — 88309 TISSUE EXAM BY PATHOLOGIST: CPT | Mod: 26 | Performed by: PATHOLOGY

## 2023-02-21 NOTE — DISCHARGE SUMMARY
[facility] Discharge Summary      Travis Villa MRN# [id number]   Age: 77 year old YOB: 1945     Date of Admission:  2/16/2023  Date of Discharge::  2/19/2023 12:34 PM  Admitting Physician:  Ingrid Peace MD  Discharge Physician:  Ingrid Peace MD     PCP:  Marce Loving    Disposition: Patient discharged from [facility] to home in stable condition.        Primary Diagnosis:   Ascending polyp          Discharge Medications:     Discharge Medication List as of 2/19/2023 11:57 AM      START taking these medications    Details   oxyCODONE (ROXICODONE) 5 MG tablet Take 1 tablet (5 mg) by mouth every 6 hours as needed for moderate to severe pain, Disp-15 tablet, R-0, E-Prescribe         CONTINUE these medications which have CHANGED    Details   aspirin 81 MG EC tablet Take 1 tablet (81 mg) by mouth 2 times daily Do not resume until post-op day 5 (2/21), Historical         CONTINUE these medications which have NOT CHANGED    Details   albuterol (PROAIR HFA/PROVENTIL HFA/VENTOLIN HFA) 108 (90 Base) MCG/ACT inhaler Inhale 2 puffs into the lungs every 6 hours as needed for shortness of breath, wheezing or cough, Historical      carvedilol (COREG) 25 MG tablet Take 1 tablet (25 mg) by mouth 2 times daily (with meals), Disp-180 tablet, R-3, E-Prescribe      Cholecalciferol (VITAMIN D3 PO) Take 1 capsule by mouth At Bedtime, Historical      diclofenac (VOLTAREN) 1 % topical gel Apply topically as needed for moderate pain (4-6), Historical      fish oil-omega-3 fatty acids 1000 MG capsule Take 1 capsule by mouth 2 times daily (with meals), Historical      Flaxseed, Linseed, OIL Take 1 tablet by mouth daily, Historical      GLUCOSAMINE SULFATE PO Take 1 tablet by mouth daily, Historical      metFORMIN (GLUCOPHAGE) 500 MG tablet Take 500 mg by mouth 2 times daily (with meals) , Historical      multivitamin w/minerals (THERA-VIT-M) tablet Take 1 tablet by mouth daily., Historical       naproxen sodium (ANAPROX) 220 MG tablet Take 220-440 mg by mouth as needed for moderate pain (4-6), Historical      nitroGLYcerin (NITROSTAT) 0.4 MG sublingual tablet Place 1 tablet (0.4 mg) under the tongue every 5 minutes as needed for chest pain, Disp-25 tablet, R-3, E-Prescribe      oxyCODONE-acetaminophen (PERCOCET) 5-325 MG tablet Take 1 tablet by mouth every 6 hours as needed for severe pain (7-10), Historical      ramipril (ALTACE) 10 MG capsule Take 1 capsule (10 mg) by mouth 2 times daily, Disp-180 capsule, R-3, E-Prescribe      tamsulosin (FLOMAX) 0.4 MG capsule Take 0.4 mg by mouth every evening, Historical         STOP taking these medications       amLODIPine (NORVASC) 2.5 MG tablet Comments:   Reason for Stopping:         indapamide (LOZOL) 1.25 MG tablet Comments:   Reason for Stopping:                      Follow Up, Special Instructions:     Discharge diet: Low residue   Discharge activity: Lifting restricted to 10 pounds   Discharge follow-up: Follow up with Dr. Peace in 3-4 weeks   Wound care: May get incision wet in shower but do not soak or scrub              Procedures:     Procedure(s): Laparoscopic right colectomy and umbilical hernia repair       No other procedures performed during this admission            Consultations:   hospitalist          Brief Hospital Summary:     Patient is a 77 year old male who underwent laparoscopic right colectomy and umbilical hernia repair on 2/16/23 by Dr. Peace.   There were no immediate complications during this procedure.    Please refer to the full operative summary for details.  The patient's hospital course was unremarkable.  Pain was controlled on oral pain regimen.  He was tolerating a low fiber diet.  Bowel function had returned prior to discharge.  He recovered as anticipated and experienced no post-operative complications.         Attestation:  I have reviewed today's vital signs, notes, medications, labs and imaging.    Danni Sherman,  DARIELA  Colorectal Physician Assistant    [facility]  [address]  [place] [address]  T: [phone]  F: [phone]            ADDENDUM:  Length of stay: 3 days  Indicate Y or N for the following:  UTI  No  C diff  No  PNA  No  SSI No  DVT No  PE  No  CVA No  MI No  Enterocutaneous fistula  No  Peripheral nerve injury  No  Abscess (not adjacent to anastomosis)  No  Leak No    Treated with:   Antibiotics NO   Drain  NO   Reoperation  NO  Death within 30 days No  Reintubation  No  Reoperation  No   Procedure n/a

## 2023-07-31 ENCOUNTER — HOSPITAL ENCOUNTER (EMERGENCY)
Facility: CLINIC | Age: 78
Discharge: HOME | End: 2023-07-31
Attending: EMERGENCY MEDICINE | Admitting: EMERGENCY MEDICINE
Payer: COMMERCIAL

## 2023-07-31 ENCOUNTER — APPOINTMENT (OUTPATIENT)
Dept: GENERAL RADIOLOGY | Facility: CLINIC | Age: 78
End: 2023-07-31
Attending: EMERGENCY MEDICINE
Payer: COMMERCIAL

## 2023-07-31 VITALS
SYSTOLIC BLOOD PRESSURE: 169 MMHG | OXYGEN SATURATION: 95 % | RESPIRATION RATE: 16 BRPM | HEART RATE: 64 BPM | DIASTOLIC BLOOD PRESSURE: 91 MMHG | TEMPERATURE: 97 F

## 2023-07-31 DIAGNOSIS — J40 BRONCHITIS WITH ACUTE WHEEZING: ICD-10-CM

## 2023-07-31 LAB
ALBUMIN SERPL BCG-MCNC: 3.9 G/DL (ref 3.5–5.2)
ALP SERPL-CCNC: 60 U/L (ref 40–129)
ALT SERPL W P-5'-P-CCNC: 22 U/L (ref 0–70)
ANION GAP SERPL CALCULATED.3IONS-SCNC: 11 MMOL/L (ref 7–15)
AST SERPL W P-5'-P-CCNC: 23 U/L (ref 0–45)
ATRIAL RATE - MUSE: 67 BPM
BASOPHILS # BLD AUTO: 0 10E3/UL (ref 0–0.2)
BASOPHILS NFR BLD AUTO: 0 %
BILIRUB SERPL-MCNC: 0.3 MG/DL
BUN SERPL-MCNC: 13.7 MG/DL (ref 8–23)
CALCIUM SERPL-MCNC: 9 MG/DL (ref 8.8–10.2)
CHLORIDE SERPL-SCNC: 101 MMOL/L (ref 98–107)
CREAT SERPL-MCNC: 0.47 MG/DL (ref 0.67–1.17)
DEPRECATED HCO3 PLAS-SCNC: 25 MMOL/L (ref 22–29)
DIASTOLIC BLOOD PRESSURE - MUSE: NORMAL MMHG
EOSINOPHIL # BLD AUTO: 0.2 10E3/UL (ref 0–0.7)
EOSINOPHIL NFR BLD AUTO: 5 %
ERYTHROCYTE [DISTWIDTH] IN BLOOD BY AUTOMATED COUNT: 14.5 % (ref 10–15)
FLUAV RNA SPEC QL NAA+PROBE: NEGATIVE
FLUBV RNA RESP QL NAA+PROBE: NEGATIVE
GFR SERPL CREATININE-BSD FRML MDRD: >90 ML/MIN/1.73M2
GLUCOSE SERPL-MCNC: 145 MG/DL (ref 70–99)
HCT VFR BLD AUTO: 41.4 % (ref 40–53)
HGB BLD-MCNC: 13 G/DL (ref 13.3–17.7)
HOLD SPECIMEN: NORMAL
HOLD SPECIMEN: NORMAL
IMM GRANULOCYTES # BLD: 0 10E3/UL
IMM GRANULOCYTES NFR BLD: 0 %
INTERPRETATION ECG - MUSE: NORMAL
LYMPHOCYTES # BLD AUTO: 1 10E3/UL (ref 0.8–5.3)
LYMPHOCYTES NFR BLD AUTO: 22 %
MCH RBC QN AUTO: 26.7 PG (ref 26.5–33)
MCHC RBC AUTO-ENTMCNC: 31.4 G/DL (ref 31.5–36.5)
MCV RBC AUTO: 85 FL (ref 78–100)
MONOCYTES # BLD AUTO: 0.6 10E3/UL (ref 0–1.3)
MONOCYTES NFR BLD AUTO: 14 %
NEUTROPHILS # BLD AUTO: 2.7 10E3/UL (ref 1.6–8.3)
NEUTROPHILS NFR BLD AUTO: 59 %
NRBC # BLD AUTO: 0 10E3/UL
NRBC BLD AUTO-RTO: 0 /100
P AXIS - MUSE: -6 DEGREES
PLATELET # BLD AUTO: 218 10E3/UL (ref 150–450)
POTASSIUM SERPL-SCNC: 4.1 MMOL/L (ref 3.4–5.3)
PR INTERVAL - MUSE: 164 MS
PROT SERPL-MCNC: 6.6 G/DL (ref 6.4–8.3)
QRS DURATION - MUSE: 96 MS
QT - MUSE: 396 MS
QTC - MUSE: 418 MS
R AXIS - MUSE: 36 DEGREES
RBC # BLD AUTO: 4.86 10E6/UL (ref 4.4–5.9)
RSV RNA SPEC NAA+PROBE: NEGATIVE
SARS-COV-2 RNA RESP QL NAA+PROBE: NEGATIVE
SODIUM SERPL-SCNC: 137 MMOL/L (ref 136–145)
SYSTOLIC BLOOD PRESSURE - MUSE: NORMAL MMHG
T AXIS - MUSE: 19 DEGREES
TROPONIN T SERPL HS-MCNC: 11 NG/L
VENTRICULAR RATE- MUSE: 67 BPM
WBC # BLD AUTO: 4.5 10E3/UL (ref 4–11)

## 2023-07-31 PROCEDURE — 36415 COLL VENOUS BLD VENIPUNCTURE: CPT | Performed by: EMERGENCY MEDICINE

## 2023-07-31 PROCEDURE — 84484 ASSAY OF TROPONIN QUANT: CPT | Performed by: EMERGENCY MEDICINE

## 2023-07-31 PROCEDURE — 80053 COMPREHEN METABOLIC PANEL: CPT | Performed by: EMERGENCY MEDICINE

## 2023-07-31 PROCEDURE — 85004 AUTOMATED DIFF WBC COUNT: CPT | Performed by: EMERGENCY MEDICINE

## 2023-07-31 PROCEDURE — 93005 ELECTROCARDIOGRAM TRACING: CPT

## 2023-07-31 PROCEDURE — 87637 SARSCOV2&INF A&B&RSV AMP PRB: CPT | Performed by: EMERGENCY MEDICINE

## 2023-07-31 PROCEDURE — 99285 EMERGENCY DEPT VISIT HI MDM: CPT | Mod: 25

## 2023-07-31 PROCEDURE — 71046 X-RAY EXAM CHEST 2 VIEWS: CPT

## 2023-07-31 RX ORDER — DOXYCYCLINE 100 MG/1
100 CAPSULE ORAL 2 TIMES DAILY
Qty: 14 CAPSULE | Refills: 0 | Status: SHIPPED | OUTPATIENT
Start: 2023-07-31 | End: 2023-08-07

## 2023-07-31 ASSESSMENT — ACTIVITIES OF DAILY LIVING (ADL): ADLS_ACUITY_SCORE: 37

## 2023-07-31 NOTE — ED NOTES
[facility] and diagnosed with pneumonia, BP there was 200s/100s so told to come to ER. Had not taken BP meds yet but took these before coming to ER, now 174/84

## 2023-08-01 NOTE — ED PROVIDER NOTES
History     Chief Complaint:  Cough       The history is provided by the patient and a relative.      Travis Villa is a 78 year old male who presents with a cough. Patient states that he went to [facility] today to get checked out and they could hear some wheezing in his lungs. They also note that they were having a hard time finding a pulse and pressures seemed not normal. He notes that he did forget to take his blood pressure medications this morning. He has had some cold symptoms now for about a couple of weeks. He notes he always has some leg swelling since being taken off his water pill. The daughter notes that he has been under a lot of stress the past weeks. Notes that he has had some phlegm that is clear and grey. He notes that this wheezing happens a couple of times a year. Denies any sore throat, asthma, and shortness of breath. He also notes that he is not a smoker either.     Independent Historian:   Daughter - They report per HPI      Medications:    Albuterol   Aspirin   Carvedilol   Cholecalciferol   Diclofenac   Glucosamine sulfate PO  Metformin   Naproxen sodium   Nitroglycerin   Oxycodone   Oxycodone-acetaminophen   Ramipril   Tamsulosin     Past Medical History:    Adenomatous colon polyp   Aortic stenosis   Benign essential HTN  Hyperplasia   BPH  Chronic narcotic use   CAD   DDD  GERD   Hyperlipidemia   Osteoarthritis   Squamous cell carcinoma   Umbilical hernia   Vitamin D deficiency     Past Surgical History:    Angioplasty   Herniorrhaphy umbilical   Colectomy      Physical Exam   Patient Vitals for the past 24 hrs:   BP Temp Temp src Pulse Resp SpO2   07/31/23 2013 (!) 169/91 -- -- -- -- 95 %   07/31/23 1915 133/76 97  F (36.1  C) Temporal 64 16 94 %   07/31/23 1830 (!) 174/84 -- -- 67 -- --        Physical Exam  Eyes:  The pupils are equal and round    Conjunctivae and sclerae are normal  ENT:    The nose is normal    Pinnae are normal    The oropharynx is normal. No evidence of PTA or  tonsillar enlargement  Neck:  Normal range of motion    There is no rigidity noted  CV:  Regular rate and rhythm     No edema  Resp:  Lungs are clear    Non-labored    No rales    No wheezing   GI:  Abdomen is soft and non-tender, there is no rigidity    No distension    No rebound tenderness   MS:  Normal muscular tone    No asymmetric leg swelling  Skin:  No rash or acute skin lesions noted  Neuro:   Awake, alert.      Speech is normal and fluent.    Face is symmetric.     Moves all extremities      Emergency Department Course   ECG  ECG taken at 1825, ECG read at 2043  Normal sinus rhythm   No significant change as compared to prior, dated 09/03/20.  Rate 67 bpm. NC interval 164 ms. QRS duration 96 ms. QT/QTc 396/418 ms. P-R-T axes -6 36 19.     Imaging:  Chest XR,  PA & LAT   Final Result   IMPRESSION: Cardiac silhouette is within normal limits. Aortic arch calcifications. No focal airspace consolidation. No pleural effusion. No pneumothorax. Multilevel degenerative changes of the spine.         Report per radiology    Laboratory:  Labs Ordered and Resulted from Time of ED Arrival to Time of ED Departure   COMPREHENSIVE METABOLIC PANEL - Abnormal       Result Value    Sodium 137      Potassium 4.1      Chloride 101      Carbon Dioxide (CO2) 25      Anion Gap 11      Urea Nitrogen 13.7      Creatinine 0.47 (*)     Calcium 9.0      Glucose 145 (*)     Alkaline Phosphatase 60      AST 23      ALT 22      Protein Total 6.6      Albumin 3.9      Bilirubin Total 0.3      GFR Estimate >90     CBC WITH PLATELETS AND DIFFERENTIAL - Abnormal    WBC Count 4.5      RBC Count 4.86      Hemoglobin 13.0 (*)     Hematocrit 41.4      MCV 85      MCH 26.7      MCHC 31.4 (*)     RDW 14.5      Platelet Count 218      % Neutrophils 59      % Lymphocytes 22      % Monocytes 14      % Eosinophils 5      % Basophils 0      % Immature Granulocytes 0      NRBCs per 100 WBC 0      Absolute Neutrophils 2.7      Absolute Lymphocytes 1.0       Absolute Monocytes 0.6      Absolute Eosinophils 0.2      Absolute Basophils 0.0      Absolute Immature Granulocytes 0.0      Absolute NRBCs 0.0     TROPONIN T, HIGH SENSITIVITY - Normal    Troponin T, High Sensitivity 11     INFLUENZA A/B, RSV, & SARS-COV2 PCR      Emergency Department Course & Assessments:       Interventions:  Medications - No data to display     Assessments:  2012 I obtained history and examined the patient as noted above. We discussed plans for discharge and patient was okay with this.    Independent Interpretation (X-rays, CTs, rhythm strip):  None    Consultations/Discussion of Management or Tests:  None        Social Determinants of Health affecting care:   None    Disposition:  The patient was discharged to home.     Impression & Plan    CMS Diagnoses: None    Medical Decision Making:  Travis Villa is a 78 year old male who presents to the emergency department with cough and concerns about high blood pressure.  [facility]  They found that his blood pressure was greater than 200 so recommended that he come to the emergency department.  He took his home blood pressure medications prior to arrival and his blood pressure is much improved.  He denies any chest pain or headache associated with this.  He has been dealing with wheezing and cough has been ongoing for approximately 2 weeks.  He has not had any fevers but he has had a runny nose.  No new leg swelling.  X-ray does not reveal any infiltrates.  On exam he does have expiratory wheezing.  No rales noted.  He has episodes like this a couple of times a year.  Suspect there may be some underlying allergies, asthma, or potentially COPD as well.  We will treat as a flare.  He has steroids at home which she has not initiated.  We will provide doxycycline and have him continue to use his albuterol inhaler that he has available to him.  Patient comfortable with plan.  Follow-up with PCP.  Return with any  new or worrisome symptoms.    Diagnosis:    ICD-10-CM    1. Bronchitis with acute wheezing  J40            Discharge Medications:  New Prescriptions    DOXYCYCLINE HYCLATE (VIBRAMYCIN) 100 MG CAPSULE    Take 1 capsule (100 mg) by mouth 2 times daily for 7 days          Scribe Disclosure:  Rm ROSALES, am serving as a scribe at 8:18 PM on 7/31/2023 to document services personally performed by Pedro Bridges MD based on my observations and the provider's statements to me.   7/31/2023   Pedro Bridges MD [place], Aaron Joseph, MD  08/01/23 0150

## 2023-08-01 NOTE — ED TRIAGE NOTES
[facility] and staff reported low hr and high bp and was told to come to ED for eval. Normotensive with hr in 60s in ED. Denies chest pain or sob. Denies fevers.      Triage Assessment       Row Name 07/31/23 1913       Respiratory WDL    Respiratory WDL X  productive cough x couple weeks       Cardiac WDL    Cardiac WDL WDL       Cognitive/Neuro/Behavioral WDL    Cognitive/Neuro/Behavioral WDL WDL

## 2023-08-04 ENCOUNTER — NURSE TRIAGE (OUTPATIENT)
Dept: CARDIOLOGY | Facility: CLINIC | Age: 78
End: 2023-08-04
Payer: COMMERCIAL

## 2023-08-04 ENCOUNTER — HOSPITAL ENCOUNTER (EMERGENCY)
Facility: CLINIC | Age: 78
Discharge: HOME | End: 2023-08-04
Attending: EMERGENCY MEDICINE | Admitting: EMERGENCY MEDICINE
Payer: COMMERCIAL

## 2023-08-04 VITALS
OXYGEN SATURATION: 95 % | RESPIRATION RATE: 18 BRPM | SYSTOLIC BLOOD PRESSURE: 180 MMHG | WEIGHT: 265 LBS | BODY MASS INDEX: 37.1 KG/M2 | TEMPERATURE: 98 F | HEIGHT: 71 IN | HEART RATE: 63 BPM | DIASTOLIC BLOOD PRESSURE: 95 MMHG

## 2023-08-04 DIAGNOSIS — G47.00 INSOMNIA, UNSPECIFIED TYPE: ICD-10-CM

## 2023-08-04 DIAGNOSIS — Z86.79 HISTORY OF HYPERTENSION: ICD-10-CM

## 2023-08-04 DIAGNOSIS — R03.0 ELEVATED BLOOD PRESSURE READING: ICD-10-CM

## 2023-08-04 DIAGNOSIS — Z86.79 HISTORY OF CORONARY ARTERY DISEASE: ICD-10-CM

## 2023-08-04 LAB
ANION GAP SERPL CALCULATED.3IONS-SCNC: 10 MMOL/L (ref 7–15)
ATRIAL RATE - MUSE: 64 BPM
BASOPHILS # BLD AUTO: 0 10E3/UL (ref 0–0.2)
BASOPHILS NFR BLD AUTO: 0 %
BUN SERPL-MCNC: 12.2 MG/DL (ref 8–23)
CALCIUM SERPL-MCNC: 9.1 MG/DL (ref 8.8–10.2)
CHLORIDE SERPL-SCNC: 100 MMOL/L (ref 98–107)
CREAT SERPL-MCNC: 0.52 MG/DL (ref 0.67–1.17)
DEPRECATED HCO3 PLAS-SCNC: 28 MMOL/L (ref 22–29)
DIASTOLIC BLOOD PRESSURE - MUSE: NORMAL MMHG
EOSINOPHIL # BLD AUTO: 0 10E3/UL (ref 0–0.7)
EOSINOPHIL NFR BLD AUTO: 0 %
ERYTHROCYTE [DISTWIDTH] IN BLOOD BY AUTOMATED COUNT: 14.2 % (ref 10–15)
GFR SERPL CREATININE-BSD FRML MDRD: >90 ML/MIN/1.73M2
GLUCOSE SERPL-MCNC: 164 MG/DL (ref 70–99)
HCT VFR BLD AUTO: 43.4 % (ref 40–53)
HGB BLD-MCNC: 13.7 G/DL (ref 13.3–17.7)
IMM GRANULOCYTES # BLD: 0 10E3/UL
IMM GRANULOCYTES NFR BLD: 0 %
INTERPRETATION ECG - MUSE: NORMAL
LYMPHOCYTES # BLD AUTO: 1.6 10E3/UL (ref 0.8–5.3)
LYMPHOCYTES NFR BLD AUTO: 18 %
MCH RBC QN AUTO: 27 PG (ref 26.5–33)
MCHC RBC AUTO-ENTMCNC: 31.6 G/DL (ref 31.5–36.5)
MCV RBC AUTO: 85 FL (ref 78–100)
MONOCYTES # BLD AUTO: 0.7 10E3/UL (ref 0–1.3)
MONOCYTES NFR BLD AUTO: 8 %
NEUTROPHILS # BLD AUTO: 6.8 10E3/UL (ref 1.6–8.3)
NEUTROPHILS NFR BLD AUTO: 74 %
NRBC # BLD AUTO: 0 10E3/UL
NRBC BLD AUTO-RTO: 0 /100
P AXIS - MUSE: -14 DEGREES
PLATELET # BLD AUTO: 219 10E3/UL (ref 150–450)
POTASSIUM SERPL-SCNC: 4 MMOL/L (ref 3.4–5.3)
PR INTERVAL - MUSE: 148 MS
QRS DURATION - MUSE: 100 MS
QT - MUSE: 408 MS
QTC - MUSE: 420 MS
R AXIS - MUSE: 52 DEGREES
RBC # BLD AUTO: 5.08 10E6/UL (ref 4.4–5.9)
SODIUM SERPL-SCNC: 138 MMOL/L (ref 136–145)
SYSTOLIC BLOOD PRESSURE - MUSE: NORMAL MMHG
T AXIS - MUSE: 17 DEGREES
TROPONIN T SERPL HS-MCNC: 11 NG/L
VENTRICULAR RATE- MUSE: 64 BPM
WBC # BLD AUTO: 9.1 10E3/UL (ref 4–11)

## 2023-08-04 PROCEDURE — 85025 COMPLETE CBC W/AUTO DIFF WBC: CPT | Performed by: EMERGENCY MEDICINE

## 2023-08-04 PROCEDURE — 93005 ELECTROCARDIOGRAM TRACING: CPT

## 2023-08-04 PROCEDURE — 99284 EMERGENCY DEPT VISIT MOD MDM: CPT

## 2023-08-04 PROCEDURE — 36415 COLL VENOUS BLD VENIPUNCTURE: CPT | Performed by: EMERGENCY MEDICINE

## 2023-08-04 PROCEDURE — 82310 ASSAY OF CALCIUM: CPT | Performed by: EMERGENCY MEDICINE

## 2023-08-04 PROCEDURE — 84484 ASSAY OF TROPONIN QUANT: CPT | Performed by: EMERGENCY MEDICINE

## 2023-08-04 RX ORDER — TRAZODONE HYDROCHLORIDE 50 MG/1
50 TABLET, FILM COATED ORAL
Qty: 10 TABLET | Refills: 0 | Status: SHIPPED | OUTPATIENT
Start: 2023-08-04

## 2023-08-04 ASSESSMENT — ACTIVITIES OF DAILY LIVING (ADL): ADLS_ACUITY_SCORE: 37

## 2023-08-04 NOTE — ED PROVIDER NOTES
History   Chief Complaint:  Hypertension     The history is provided by the patient, medical records and a relative (daughter).      Travis Villa is a 78 year old male with history of HTN, CAD, and diabetes mellitus who presents to the ED with high blood pressure. Patient regularly monitors his blood pressures and noticed that they have been spiking over the last few weeks, with his maximum pressure being in the 220s systolic, though at times they have been as low as the 130s and 140s. He currently takes Ramipril 10 mg BID and Carvedilol 25 mg BID for his HTN. He says he has been taking these medications consistently, besides missing one dose a few days ago. He last took these medications this morning. Patient reports that prior to the last several weeks, his blood pressures were well controlled and on the verge of being too low. He adds that the last few weeks have been quite stressful for him, as his wife was hospitalized due to a stroke and was just discharged home a handful of days ago. He also was started on Prednisone for neck pain 5-6 days ago. Travis denies recent chest pain, one-sided weakness, headache, and other associated symptoms. He currently expresses concerns about having a stroke or heart attack and would like to rule these conditions out while in the ED today. Of note, patient was seen in the ED on 7/31 for a cough and was sent home with a course of Doxycyline. His chest XR was negative at this time.     Independent Historian:    Daughter- She reports additional history as noted above.     Review of External Notes:  I performed electronic chart review, I see that his blood pressure when he was in the ED on July 31 was 174/84 and 169/91.  He was noted to be experiencing increased stress at that time.    Medications:    Pro-air  Aspirin 81 mg  Deltasone   Coreg  Vibramycin   Glucophage   Anaprox   Altace   Percocet   Nitrostat  Roxicodone     Past Medical History:    Aortic  "stenosis  HTN  BPH  Chronic narcotic use  CAD  DDD, cervical and lumbar  Diabetes mellitus   GERD  Mixed hyperlipidemia   Morbid obesity  MI  Vitamin D deficiency   Umbilical hernia with obstruction   Squamous cell carcinoma  OA  Spinal stenosis of lumbar region  Metabolic syndrome X    Past Surgical History:    Heart catheterization with stent placement  Umbilical herniorrhaphy   Laparoscopic right hemicolectomy    L3-4 Hemilaminectomy bilateral   Transurethral prostate resection     Physical Exam   Patient Vitals for the past 24 hrs:   BP Temp Temp src Pulse Resp SpO2 Height Weight   08/04/23 1541 (!) 180/95 -- -- -- -- -- -- --   08/04/23 1500 (!) 180/94 -- -- 63 -- 95 % -- --   08/04/23 1426 (!) 187/100 -- -- 64 18 96 % -- --   08/04/23 1411 -- -- -- 62 29 94 % -- --   08/04/23 1356 -- -- -- 59 22 93 % -- --   08/04/23 1345 -- -- -- 62 22 92 % -- --   08/04/23 1341 -- -- -- 64 (!) 45 93 % -- --   08/04/23 1333 (!) 201/108 -- -- 63 (!) 41 94 % -- --   08/04/23 1326 -- -- -- -- -- 90 % -- --   08/04/23 1325 (!) 201/104 98  F (36.7  C) Oral 61 20 -- 1.803 m (5' 11\") 120.2 kg (265 lb)      Physical Exam  General: Male semirecumbent in room 12, daughter at bedside  HENT: mucous membranes moist  CV: rate as above, minimally irregular rhythm, no lower extremity edema, no JVD, palpable symmetric radial pulses, +murmur audible  Resp: normal effort, speaks in full phrases, no stridor, no cough observed  GI: abdomen soft and nontender, no guarding, negative Portillo's sign  MSK: no bony tenderness to chest  Skin: appropriately warm and dry, no erythema or vesicles to chest wall  Neuro: alert, clear speech, oriented, no lateralizing motor or sensory deficits, no nuchal rigidity, responds briskly and appropriate to questions and commands  Psych: cooperative, pleasant, no apparent hallucinations    Emergency Department Course   ECG  ECG taken at 1342, ECG read at 1345  Sinus rhythm with premature supraventricular " complexes  Incomplete RBBB (old)  Cannot rule out anterior infarct (old)   When compared with prior ECG, dated 7/31/2023, no change was found.  Rate 64 bpm. MO interval 148 ms. QRS duration 100 ms. QT/QTc 408/420 ms. P-R-T axes -14 52 17.    Laboratory:  Labs Ordered and Resulted from Time of ED Arrival to Time of ED Departure   BASIC METABOLIC PANEL - Abnormal       Result Value    Sodium 138      Potassium 4.0      Chloride 100      Carbon Dioxide (CO2) 28      Anion Gap 10      Urea Nitrogen 12.2      Creatinine 0.52 (*)     Calcium 9.1      Glucose 164 (*)     GFR Estimate >90     TROPONIN T, HIGH SENSITIVITY - Normal    Troponin T, High Sensitivity 11     CBC WITH PLATELETS AND DIFFERENTIAL    WBC Count 9.1      RBC Count 5.08      Hemoglobin 13.7      Hematocrit 43.4      MCV 85      MCH 27.0      MCHC 31.6      RDW 14.2      Platelet Count 219      % Neutrophils 74      % Lymphocytes 18      % Monocytes 8      % Eosinophils 0      % Basophils 0      % Immature Granulocytes 0      NRBCs per 100 WBC 0      Absolute Neutrophils 6.8      Absolute Lymphocytes 1.6      Absolute Monocytes 0.7      Absolute Eosinophils 0.0      Absolute Basophils 0.0      Absolute Immature Granulocytes 0.0      Absolute NRBCs 0.0        Emergency Department Course & Assessments:    Assessments:  1407 I obtained history and examined the patient as noted above.  1548 I rechecked the patient and explained findings.    Social Determinants of Health affecting care:  Healthcare Access/Compliance and Stress/Adjustment Disorders     Disposition:  The patient was discharged to home.     Impression & Plan    Medical Decision Making:  He presents with concern for elevated blood pressure readings, multiple readings here in the ED do confirm hypertension though fortunately he does not have any focal signs or symptoms to suggest the likelihood of acute endorgan damage.  Blood counts are satisfactory, noting a troponin that is normal and adequately  rules out an acute coronary syndrome in the absence of more specific symptoms.  He actually states that he feels well, but is simply concerned by the numbers.  I do not think that formal evaluation for stroke with brain imaging is indicated at this time in the absence of neurologic signs or symptoms.  We discussed that the stress regarding his wife's recent illness and prolonged hospitalization as well as his recent prednisone use are likely contributing at least to some degree to his presenting hypertension.  For this reason, in combination with the fact that his readings have been fairly volatile, and in the past his blood pressure medicines have been adjusted due to hypotension, led to my conclusion that it is most appropriate that emergent changes to his blood pressure regimen not be made from his ED visit today, but instead he was advised to check his blood pressures once a day, write down the readings, and follow-up with his primary clinic.  We did check his home blood pressure cuff against our ED monitor here today, they seem to correspond quite well.  Patient and daughter are both very comfortable with discharge home.  He should return for concerning symptoms at any hour which was specifically discussed with him.  Daughter thought that he might benefit from a medication to help with his sleep, after discussing potential adverse effects I was willing to prescribe a short course of trazodone to trial this and advise close outpatient follow-up through primary care as well as his cardiology team.    Diagnosis:    ICD-10-CM    1. Elevated blood pressure reading  R03.0       2. History of hypertension  Z86.79       3. History of coronary artery disease  Z86.79       4. Insomnia, unspecified type  G47.00          Discharge Medications:  Discharge Medication List as of 8/4/2023  4:08 PM        START taking these medications    Details   traZODone (DESYREL) 50 MG tablet Take 1 tablet (50 mg) by mouth nightly as  needed for sleep, Disp-10 tablet, R-0, E-Prescribe             Scribe Disclosure:  I, Manuela Frances, am serving as a scribe at 2:07 PM on 8/4/2023 to document services personally performed by Jamison Melgar MD based on my observations and the provider's statements to me.  8/4/2023   Jamison Melgar MD Reitsema, Jeffrey Alan, MD  08/04/23 2141

## 2023-08-04 NOTE — TELEPHONE ENCOUNTER
"Received a call from the call center to discuss high blood pressure.  Spoke to Lele, who says for the last month or so, his blood pressure has been jumping up and down.   He says he went to [facility] clinic on 7/31/23 for a wheezing and lung problem and BP there was 208/130 and was directed to the ED. BP at [facility] ED was 169/91 and 174/84, and diagnosed with bronchitis.   He says BP was staying 130's/70's until last night it was 226/120. He didn't believe it so rechecked with a different BP monitor and was 200's/100's.  He says he went to [facility] this morning for a neck issue and when he got home about 10 am BP was 151/88.   He says he is taking carvedilol 25 mg twice daily and ramipril 10 mg twice daily.  He says he wanted to get in to see Dr. Valdez.  Advised a message will be sent to [place] [facility] for follow up.      1. BLOOD PRESSURE: \"What is the blood pressure?\" \"Did you take at least two measurements 5 minutes apart?\"  2. ONSET: \"When did you take your blood pressure?\" 10 am   3. HOW: \"How did you obtain the blood pressure?\" (e.g., visiting nurse, automatic home BP monitor) BP monitor  4. HISTORY: \"Do you have a history of high blood pressure?\" yes  5. MEDICATIONS: \"Are you taking any medications for blood pressure?\" \"Have you missed any doses recently?\" Carvedilol and ramipril  6. OTHER SYMPTOMS: \"Do you have any symptoms?\" (e.g., headache, chest pain, blurred vision, difficulty breathing, weakness)  7. PREGNANCY: \"Is there any chance you are pregnant?\" \"When was your last menstrual period?\"  Additional Information    Negative: Systolic BP >= 160 OR Diastolic >= 100, and any cardiac or neurologic symptoms (e.g., chest pain, difficulty breathing, unsteady gait, blurred vision)    Protocols used: Blood Pressure - High-A-OH    "

## 2023-08-04 NOTE — TELEPHONE ENCOUNTER
Updated Blood pressures verified by pharmacy on 8/4/23 @ noon    199/101  198/103  205/101    Heart rates consistently in the 60s. Confirmed with patient again that he has been taking all blood pressure medications consistently.     RN advised him to go to ED. He will report to [facility]. Report given to [facility] ED triage team.    Gaetano Park RN

## 2023-08-04 NOTE — ED NOTES
Pt advised to follow-up with cardiology or to return to ED if having worsening symptoms. Pt to  Rx at [facility]

## 2023-08-04 NOTE — TELEPHONE ENCOUNTER
Routing to Dr. Valdez,     Patient called in stating that he took his BP last night that showed values in the 200s. Recent health history states he went into the ED 7/31/23 for bronchitis and high BP, referred from urgent care. ED found him to have BP in the 160-170s / 90-100s. He was subsequent discharged. He also endorsed chronic neck pain that woke him up and he took a BP last night. Recently prescribed a steroid for neck arthritis.     This morning he states his blood pressure has normalized in the 150-160s. Heart rates consistently in the 50-60 bpm. No other symptoms noted. He also states he has been under a fair amount of stress due to his wife being recently discharged from the hospital from a stroke.     RN stated that he should take his blood pressure cuff into the pharmacy to double check the validity of the values and call back. If his BP is in the 200s, RN advised to go into the ED right away. If BP cuff is accurate and the values are in the 150-160s, make sure to follow up with PCP for post ED follow up. If BP is accurate and values are within range now but ends up rising above 180 systolic in the future, to report to the ED.     Last seen 11/29/23  PAST MEDICAL HISTORY:  1.  Mild to moderate aortic stenosis - echocardiogram 11/21/2022 showing ejection fraction of 60% with aortic stenosis - valve area of 1.7 cm2 and mean gradient of 22 mmHg.  2.  Coronary artery disease.  a.  History of PCI of circumflex on right coronary in 2008.His last regadenoson nuclear stress test in 2020 showed no ischemia with a normal ejection fraction  3.  Obesity.  4.  Hypertension.  5.  Dyslipidemia.  6.  Type 2 diabetes mellitus.  7.  Osteoarthritis - chronic low back pain    MEDICATIONS:  1.  Aspirin 81 daily.  2.  Carvedilol 25 mg b.i.d.  3.  Niacin.  4.  Ramipril 10 mg b.i.d.    Medications stopped since last OV in Feb 2023 due to hypotension in IP setting for hernia repair.  Amlodipine 5 mg daily  Indapamide 1.25 mg  daily    Any other recommendations at this time?    Gaetano Park RN

## 2023-08-04 NOTE — TELEPHONE ENCOUNTER
Verified by Dr. Valdez that he has agreed with plan of care. Gaetano Park RN August 4, 2023 12:39 PM

## 2023-08-09 ENCOUNTER — OFFICE VISIT (OUTPATIENT)
Dept: CARDIOLOGY | Facility: CLINIC | Age: 78
End: 2023-08-09
Payer: COMMERCIAL

## 2023-08-09 VITALS
OXYGEN SATURATION: 93 % | SYSTOLIC BLOOD PRESSURE: 106 MMHG | BODY MASS INDEX: 34.2 KG/M2 | DIASTOLIC BLOOD PRESSURE: 65 MMHG | WEIGHT: 266.5 LBS | HEIGHT: 74 IN | HEART RATE: 68 BPM

## 2023-08-09 DIAGNOSIS — R00.1 BRADYCARDIA: Primary | ICD-10-CM

## 2023-08-09 PROCEDURE — 99214 OFFICE O/P EST MOD 30 MIN: CPT | Performed by: PHYSICIAN ASSISTANT

## 2023-08-09 NOTE — PROGRESS NOTES
Primary Cardiologist: Dr. Valdez     Reason for Visit: Hypertension management    History of Present Illness:   Lele is a pleasant 78-year-old male who was seen in the emergency department recently due to elevated blood pressure in the setting of increased stress with his spouse being admitted with stroke and having an MI.  He thinks his blood pressure is under better control now but he has noticed that his pulse can be low intermittently in the high 40s and this is concerning to him.  He denies associated lightheadedness or any recent syncopal episodes.    Lele's past medical history is notable for mild to moderate aortic stenosis per echocardiogram in 11/2022, CAD (history of PCI of LCx and RCA in 2008; Lexiscan study in 2020 showed no ischemia), obesity, hypertension, hyperlipidemia, type 2 diabetes mellitus, and osteoarthritis.    Lele states that his spouse is at home and she does not have any significant deficits from her recent stroke.  They feel very grateful for this.  He feels like he is stresses more now.  His blood pressure is under much better control most recently based on his home checks.  His pulse however sometimes dips down to 40s.  He is on high dose of carvedilol and he denies any recent changes to this medication.  He has no chest pain, palpitations, peripheral edema, orthopnea, PND, or abdominal distention.  He has had no bleeding issues.    Assessment and Plan:  Lele is a very pleasant 78-year-old male with past medical history notable for mild to moderate aortic stenosis, CAD with multiple PCI's in the distant past, hypertension, hyperlipidemia, type 2 diabetes mellitus, and obesity.    His blood pressure is under much better control.  I suspect his recent spikes were related to increased stress given his wife's acute health issues.  He does have intermittent pulses down in the high 40s though he is not symptomatic with these.  We ultimately decided to have him get a 24-hour Holter monitor for  further evaluation of this.  If there are any significant pauses or bradycardia arrhythmias we will let him know and likely decrease his carvedilol dose.  In that case we may consider increasing his other antihypertensive medications or adding a third agent.  I have also encouraged him to follow-up with Dr. Mell alicea in 3 months with repeat echocardiogram for reevaluation of his known aortic stenosis.  He verbalized understanding and was in agreement with the current care plan.      This note was completed in part using Dragon voice recognition software. Although reviewed after completion, some word and grammatical errors may occur.    Orders this Visit:  No orders of the defined types were placed in this encounter.    No orders of the defined types were placed in this encounter.    There are no discontinued medications.      No diagnosis found.    CURRENT MEDICATIONS:  Current Outpatient Medications   Medication Sig Dispense Refill    albuterol (PROAIR HFA/PROVENTIL HFA/VENTOLIN HFA) 108 (90 Base) MCG/ACT inhaler Inhale 2 puffs into the lungs every 6 hours as needed for shortness of breath, wheezing or cough      aspirin 81 MG EC tablet Take 1 tablet (81 mg) by mouth 2 times daily Do not resume until post-op day 5 (2/21)      carvedilol (COREG) 25 MG tablet Take 1 tablet (25 mg) by mouth 2 times daily (with meals) 180 tablet 3    GLUCOSAMINE SULFATE PO Take 1 tablet by mouth daily      nitroGLYcerin (NITROSTAT) 0.4 MG sublingual tablet Place 1 tablet (0.4 mg) under the tongue every 5 minutes as needed for chest pain 25 tablet 3    oxyCODONE (ROXICODONE) 5 MG tablet Take 1 tablet (5 mg) by mouth every 6 hours as needed for moderate to severe pain 15 tablet 0    oxyCODONE-acetaminophen (PERCOCET) 5-325 MG tablet Take 1 tablet by mouth every 6 hours as needed for severe pain (7-10)      ramipril (ALTACE) 10 MG capsule Take 1 capsule (10 mg) by mouth 2 times daily 180 capsule 3    tamsulosin (FLOMAX) 0.4 MG capsule  Take 0.4 mg by mouth every evening      traZODone (DESYREL) 50 MG tablet Take 1 tablet (50 mg) by mouth nightly as needed for sleep 10 tablet 0    Cholecalciferol (VITAMIN D3 PO) Take 1 capsule by mouth At Bedtime (Patient not taking: Reported on 8/9/2023)      diclofenac (VOLTAREN) 1 % topical gel Apply topically as needed for moderate pain (4-6) (Patient not taking: Reported on 8/9/2023)      fish oil-omega-3 fatty acids 1000 MG capsule Take 1 capsule by mouth 2 times daily (with meals) (Patient not taking: Reported on 8/9/2023)      Flaxseed, Linseed, OIL Take 1 tablet by mouth daily (Patient not taking: Reported on 8/9/2023)      metFORMIN (GLUCOPHAGE) 500 MG tablet Take 500 mg by mouth 2 times daily (with meals)       multivitamin w/minerals (THERA-VIT-M) tablet Take 1 tablet by mouth daily. (Patient not taking: Reported on 8/9/2023)      naproxen sodium (ANAPROX) 220 MG tablet Take 220-440 mg by mouth as needed for moderate pain (4-6) (Patient not taking: Reported on 8/9/2023)         ALLERGIES     Allergies   Allergen Reactions    Atorvastatin Other (See Comments)     Muscle aches       PAST MEDICAL HISTORY:  Past Medical History:   Diagnosis Date    Adenomatous colon polyp     Aortic stenosis     Benign essential HTN     Benign prostatic hyperplasia     BPH (benign prostatic hyperplasia)     Chronic narcotic use     Coronary artery disease involving native coronary artery of native heart     DDD (degenerative disc disease), cervical     DDD (degenerative disc disease), lumbar     Diabetes mellitus (H)     Elevated prostate specific antigen (PSA)     GERD (gastroesophageal reflux disease)     Indwelling Marrufo catheter present     for BPH    Mixed hyperlipidemia     Morbid obesity (H)     Myocardial infarction (H)     Obesity     Osteoarthritis     Squamous cell carcinoma in situ of skin of face     Synovial cyst of right popliteal space     Umbilical hernia with obstruction     Unspecified disorder of  "metabolism     Vitamin D deficiency        PAST SURGICAL HISTORY:  Past Surgical History:   Procedure Laterality Date    HEART CATH, ANGIOPLASTY  2008     PCI RCA,Circumflex rotational atherectomy, and ARETHA placement    HERNIORRHAPHY UMBILICAL N/A 2023    Procedure: UMBILICAL HERNIA REPAIR;  Surgeon: Ingrid Peace MD;  Location:  OR    LAPAROSCOPIC ASSISTED COLECTOMY Right 2023    Procedure: Laparoscopic Right Hemicolectomy and skaggs exchange;  Surgeon: Ingrid Peace MD;  Location:  OR       FAMILY HISTORY:  Family History   Problem Relation Age of Onset    Coronary Artery Disease Early Onset Mother          of MI at age 58    Cancer Father         Pancreas       SOCIAL HISTORY:  Social History     Socioeconomic History    Marital status:      Spouse name: None    Number of children: None    Years of education: None    Highest education level: None   Tobacco Use    Smoking status: Never    Smokeless tobacco: Never   Substance and Sexual Activity    Alcohol use: No     Comment: occasional    Drug use: No   Other Topics Concern    Caffeine Concern No    Exercise No    Seat Belt Yes       Review of Systems:  Skin:        Eyes:       ENT:       Respiratory:       Cardiovascular:       Gastroenterology:      Genitourinary:       Musculoskeletal:       Neurologic:       Psychiatric:       Heme/Lymph/Imm:       Endocrine:         Physical Exam:  Vitals: /65 (BP Location: Right arm, Cuff Size: Adult Large)   Pulse 68   Ht 1.88 m (6' 2\")   Wt 120.9 kg (266 lb 8 oz)   SpO2 93%   BMI 34.22 kg/m       GEN:  NAD  NECK: No JVD  C/V:  Regular rate and rhythm; there is 2/6 PRASANNA at RUSB  RESP: Clear to auscultation bilaterally without wheezing, rales, or rhonchi.  GI: Abdomen soft, nontender, nondistended. No HSM appreciated.   EXTREM: No pitting LE edema.   NEURO: Alert and oriented, cooperative. No obvious focal deficits.   PSYCH: Normal affect.  SKIN: Warm and dry.       Recent " Lab Results:  LIPID RESULTS:  Lab Results   Component Value Date    CHOL 190 12/06/2017    HDL 32 12/06/2017     12/06/2017    TRIG 229 12/06/2017    CHOLHDLRATIO 3.9 03/13/2014       LIVER ENZYME RESULTS:  Lab Results   Component Value Date    AST 23 07/31/2023    AST 33 12/29/2012    ALT 22 07/31/2023    ALT 6 03/13/2014       CBC RESULTS:  Lab Results   Component Value Date    WBC 9.1 08/04/2023    WBC 7.4 02/27/2019    RBC 5.08 08/04/2023    RBC 5.08 02/27/2019    HGB 13.7 08/04/2023    HGB 14.3 02/27/2019    HCT 43.4 08/04/2023    HCT 42.3 02/27/2019    MCV 85 08/04/2023    MCV 83 02/27/2019    MCH 27.0 08/04/2023    MCH 28.1 02/27/2019    MCHC 31.6 08/04/2023    MCHC 33.8 02/27/2019    RDW 14.2 08/04/2023    RDW 14.3 02/27/2019     08/04/2023     02/27/2019       BMP RESULTS:  Lab Results   Component Value Date     08/04/2023     02/27/2019    POTASSIUM 4.0 08/04/2023    POTASSIUM 4.0 02/27/2019    CHLORIDE 100 08/04/2023    CHLORIDE 96 02/27/2019    CO2 28 08/04/2023    CO2 30 02/27/2019    ANIONGAP 10 08/04/2023    ANIONGAP 8 02/27/2019     (H) 08/04/2023     (H) 02/19/2023     (H) 02/27/2019    BUN 12.2 08/04/2023    BUN 14 02/27/2019    CR 0.52 (L) 08/04/2023    CR 0.46 (L) 02/27/2019    GFRESTIMATED >90 08/04/2023    GFRESTIMATED >60 01/24/2023    GFRESTIMATED >90 02/27/2019    GFRESTBLACK >90 02/27/2019    LEIGH ANN 9.1 08/04/2023    LEIGH ANN 8.9 02/27/2019        A1C RESULTS:  No results found for: A1C    INR RESULTS:  Lab Results   Component Value Date    INR 1.03 05/02/2008    INR 1.04 03/13/2008           Timi Thomas PA-C  August 9, 2023

## 2023-08-09 NOTE — PATIENT INSTRUCTIONS
Today's Plan:   1) Set up 24 hours heart monitor to see if your low pulse is concerning.   2) Follow up with Dr. Valdez in 3 months.     If you have questions or concerns please call my nurse team at [phone]    Scheduling phone number: [phone]  Reminder: Please bring in all current medications, over the counter supplements and vitamin bottles to your next appointment.    It was a pleasure seeing you today!

## 2023-08-09 NOTE — LETTER
8/9/2023    Marce Loving MD  [facility] [address]  [facility] [address]    RE: Travis Villa       Dear Colleague,     I had the pleasure of seeing Travis BRIDGETT [place] in the [facility].  Primary Cardiologist: Dr. Valdez     Reason for Visit: Hypertension management    History of Present Illness:   Lele is a pleasant 78-year-old male who was seen in the emergency department recently due to elevated blood pressure in the setting of increased stress with his spouse being admitted with stroke and having an MI.  He thinks his blood pressure is under better control now but he has noticed that his pulse can be low intermittently in the high 40s and this is concerning to him.  He denies associated lightheadedness or any recent syncopal episodes.    Lele's past medical history is notable for mild to moderate aortic stenosis per echocardiogram in 11/2022, CAD (history of PCI of LCx and RCA in 2008; Lexiscan study in 2020 showed no ischemia), obesity, hypertension, hyperlipidemia, type 2 diabetes mellitus, and osteoarthritis.    Lele states that his spouse is at home and she does not have any significant deficits from her recent stroke.  They feel very grateful for this.  He feels like he is stresses more now.  His blood pressure is under much better control most recently based on his home checks.  His pulse however sometimes dips down to 40s.  He is on high dose of carvedilol and he denies any recent changes to this medication.  He has no chest pain, palpitations, peripheral edema, orthopnea, PND, or abdominal distention.  He has had no bleeding issues.    Assessment and Plan:  Lele is a very pleasant 78-year-old male with past medical history notable for mild to moderate aortic stenosis, CAD with multiple PCI's in the distant past, hypertension, hyperlipidemia, type 2 diabetes mellitus, and obesity.    His blood pressure is under much better control.  I suspect his recent spikes were  related to increased stress given his wife's acute health issues.  He does have intermittent pulses down in the high 40s though he is not symptomatic with these.  We ultimately decided to have him get a 24-hour Holter monitor for further evaluation of this.  If there are any significant pauses or bradycardia arrhythmias we will let him know and likely decrease his carvedilol dose.  In that case we may consider increasing his other antihypertensive medications or adding a third agent.  I have also encouraged him to follow-up with Dr. Mell alicea in 3 months with repeat echocardiogram for reevaluation of his known aortic stenosis.  He verbalized understanding and was in agreement with the current care plan.      This note was completed in part using Dragon voice recognition software. Although reviewed after completion, some word and grammatical errors may occur.    Orders this Visit:  No orders of the defined types were placed in this encounter.    No orders of the defined types were placed in this encounter.    There are no discontinued medications.      No diagnosis found.    CURRENT MEDICATIONS:  Current Outpatient Medications   Medication Sig Dispense Refill    albuterol (PROAIR HFA/PROVENTIL HFA/VENTOLIN HFA) 108 (90 Base) MCG/ACT inhaler Inhale 2 puffs into the lungs every 6 hours as needed for shortness of breath, wheezing or cough      aspirin 81 MG EC tablet Take 1 tablet (81 mg) by mouth 2 times daily Do not resume until post-op day 5 (2/21)      carvedilol (COREG) 25 MG tablet Take 1 tablet (25 mg) by mouth 2 times daily (with meals) 180 tablet 3    GLUCOSAMINE SULFATE PO Take 1 tablet by mouth daily      nitroGLYcerin (NITROSTAT) 0.4 MG sublingual tablet Place 1 tablet (0.4 mg) under the tongue every 5 minutes as needed for chest pain 25 tablet 3    oxyCODONE (ROXICODONE) 5 MG tablet Take 1 tablet (5 mg) by mouth every 6 hours as needed for moderate to severe pain 15 tablet 0    oxyCODONE-acetaminophen  (PERCOCET) 5-325 MG tablet Take 1 tablet by mouth every 6 hours as needed for severe pain (7-10)      ramipril (ALTACE) 10 MG capsule Take 1 capsule (10 mg) by mouth 2 times daily 180 capsule 3    tamsulosin (FLOMAX) 0.4 MG capsule Take 0.4 mg by mouth every evening      traZODone (DESYREL) 50 MG tablet Take 1 tablet (50 mg) by mouth nightly as needed for sleep 10 tablet 0    Cholecalciferol (VITAMIN D3 PO) Take 1 capsule by mouth At Bedtime (Patient not taking: Reported on 8/9/2023)      diclofenac (VOLTAREN) 1 % topical gel Apply topically as needed for moderate pain (4-6) (Patient not taking: Reported on 8/9/2023)      fish oil-omega-3 fatty acids 1000 MG capsule Take 1 capsule by mouth 2 times daily (with meals) (Patient not taking: Reported on 8/9/2023)      Flaxseed, Linseed, OIL Take 1 tablet by mouth daily (Patient not taking: Reported on 8/9/2023)      metFORMIN (GLUCOPHAGE) 500 MG tablet Take 500 mg by mouth 2 times daily (with meals)       multivitamin w/minerals (THERA-VIT-M) tablet Take 1 tablet by mouth daily. (Patient not taking: Reported on 8/9/2023)      naproxen sodium (ANAPROX) 220 MG tablet Take 220-440 mg by mouth as needed for moderate pain (4-6) (Patient not taking: Reported on 8/9/2023)         ALLERGIES     Allergies   Allergen Reactions    Atorvastatin Other (See Comments)     Muscle aches       PAST MEDICAL HISTORY:  Past Medical History:   Diagnosis Date    Adenomatous colon polyp     Aortic stenosis     Benign essential HTN     Benign prostatic hyperplasia     BPH (benign prostatic hyperplasia)     Chronic narcotic use     Coronary artery disease involving native coronary artery of native heart     DDD (degenerative disc disease), cervical     DDD (degenerative disc disease), lumbar     Diabetes mellitus (H)     Elevated prostate specific antigen (PSA)     GERD (gastroesophageal reflux disease)     Indwelling Marrufo catheter present     for BPH    Mixed hyperlipidemia     Morbid obesity  "(H)     Myocardial infarction (H)     Obesity     Osteoarthritis     Squamous cell carcinoma in situ of skin of face     Synovial cyst of right popliteal space     Umbilical hernia with obstruction     Unspecified disorder of metabolism     Vitamin D deficiency        PAST SURGICAL HISTORY:  Past Surgical History:   Procedure Laterality Date    HEART CATH, ANGIOPLASTY  2008     PCI RCA,Circumflex rotational atherectomy, and ARETHA placement    HERNIORRHAPHY UMBILICAL N/A 2023    Procedure: UMBILICAL HERNIA REPAIR;  Surgeon: Ingrid Peace MD;  Location:  OR    LAPAROSCOPIC ASSISTED COLECTOMY Right 2023    Procedure: Laparoscopic Right Hemicolectomy and skaggs exchange;  Surgeon: Ingrid Peace MD;  Location:  OR       FAMILY HISTORY:  Family History   Problem Relation Age of Onset    Coronary Artery Disease Early Onset Mother          of MI at age 58    Cancer Father         Pancreas       SOCIAL HISTORY:  Social History     Socioeconomic History    Marital status:      Spouse name: None    Number of children: None    Years of education: None    Highest education level: None   Tobacco Use    Smoking status: Never    Smokeless tobacco: Never   Substance and Sexual Activity    Alcohol use: No     Comment: occasional    Drug use: No   Other Topics Concern    Caffeine Concern No    Exercise No    Seat Belt Yes       Review of Systems:  Skin:        Eyes:       ENT:       Respiratory:       Cardiovascular:       Gastroenterology:      Genitourinary:       Musculoskeletal:       Neurologic:       Psychiatric:       Heme/Lymph/Imm:       Endocrine:         Physical Exam:  Vitals: /65 (BP Location: Right arm, Cuff Size: Adult Large)   Pulse 68   Ht 1.88 m (6' 2\")   Wt 120.9 kg (266 lb 8 oz)   SpO2 93%   BMI 34.22 kg/m       GEN:  NAD  NECK: No JVD  C/V:  Regular rate and rhythm; there is 2/6 PRASANNA at RUSB  RESP: Clear to auscultation bilaterally without wheezing, rales, or " rhonchi.  GI: Abdomen soft, nontender, nondistended. No HSM appreciated.   EXTREM: No pitting LE edema.   NEURO: Alert and oriented, cooperative. No obvious focal deficits.   PSYCH: Normal affect.  SKIN: Warm and dry.       Recent Lab Results:  LIPID RESULTS:  Lab Results   Component Value Date    CHOL 190 12/06/2017    HDL 32 12/06/2017     12/06/2017    TRIG 229 12/06/2017    CHOLHDLRATIO 3.9 03/13/2014       LIVER ENZYME RESULTS:  Lab Results   Component Value Date    AST 23 07/31/2023    AST 33 12/29/2012    ALT 22 07/31/2023    ALT 6 03/13/2014       CBC RESULTS:  Lab Results   Component Value Date    WBC 9.1 08/04/2023    WBC 7.4 02/27/2019    RBC 5.08 08/04/2023    RBC 5.08 02/27/2019    HGB 13.7 08/04/2023    HGB 14.3 02/27/2019    HCT 43.4 08/04/2023    HCT 42.3 02/27/2019    MCV 85 08/04/2023    MCV 83 02/27/2019    MCH 27.0 08/04/2023    MCH 28.1 02/27/2019    MCHC 31.6 08/04/2023    MCHC 33.8 02/27/2019    RDW 14.2 08/04/2023    RDW 14.3 02/27/2019     08/04/2023     02/27/2019       BMP RESULTS:  Lab Results   Component Value Date     08/04/2023     02/27/2019    POTASSIUM 4.0 08/04/2023    POTASSIUM 4.0 02/27/2019    CHLORIDE 100 08/04/2023    CHLORIDE 96 02/27/2019    CO2 28 08/04/2023    CO2 30 02/27/2019    ANIONGAP 10 08/04/2023    ANIONGAP 8 02/27/2019     (H) 08/04/2023     (H) 02/19/2023     (H) 02/27/2019    BUN 12.2 08/04/2023    BUN 14 02/27/2019    CR 0.52 (L) 08/04/2023    CR 0.46 (L) 02/27/2019    GFRESTIMATED >90 08/04/2023    GFRESTIMATED >60 01/24/2023    GFRESTIMATED >90 02/27/2019    GFRESTBLACK >90 02/27/2019    LEIGH ANN 9.1 08/04/2023    LEIGH ANN 8.9 02/27/2019        A1C RESULTS:  No results found for: A1C    INR RESULTS:  Lab Results   Component Value Date    INR 1.03 05/02/2008    INR 1.04 03/13/2008           Timi Thomsa PA-C  August 9, 2023       Thank you for allowing me to participate in the care of your  patient.      Sincerely,     DARIELA Fernandez [facility]  cc:   No referring provider defined for this encounter.

## 2023-08-23 ENCOUNTER — HOSPITAL ENCOUNTER (OUTPATIENT)
Dept: CARDIOLOGY | Facility: CLINIC | Age: 78
Discharge: HOME | End: 2023-08-23
Attending: PHYSICIAN ASSISTANT | Admitting: PHYSICIAN ASSISTANT
Payer: COMMERCIAL

## 2023-08-23 DIAGNOSIS — R00.1 BRADYCARDIA: ICD-10-CM

## 2023-08-23 PROCEDURE — 93227 XTRNL ECG REC<48 HR R&I: CPT | Performed by: INTERNAL MEDICINE

## 2023-08-23 PROCEDURE — 93226 XTRNL ECG REC<48 HR SCAN A/R: CPT

## 2023-08-31 NOTE — RESULT ENCOUNTER NOTE
Called pt with Dr. Valdez' comments/review of holter monitor. Patient had no questions/concerns at this time. He will follow up as scheduled or contact us sooner if concerns. Kasey Gibson RN on 8/31/2023 at 2:07 PM      Ambrocio Valdez MD Charles, Danielle, JODIE; Timi Thomas PA-C Hi Danielle  Please assure the patient no worrisome pauses were seen. Monitor looks fine.

## 2023-10-29 ENCOUNTER — HEALTH MAINTENANCE LETTER (OUTPATIENT)
Age: 78
End: 2023-10-29

## 2023-10-30 ENCOUNTER — HOSPITAL ENCOUNTER (OUTPATIENT)
Dept: CARDIOLOGY | Facility: CLINIC | Age: 78
Discharge: HOME | End: 2023-10-30
Attending: INTERNAL MEDICINE | Admitting: INTERNAL MEDICINE
Payer: COMMERCIAL

## 2023-10-30 DIAGNOSIS — I10 BENIGN ESSENTIAL HYPERTENSION: ICD-10-CM

## 2023-10-30 DIAGNOSIS — I35.0 NONRHEUMATIC AORTIC VALVE STENOSIS: ICD-10-CM

## 2023-10-30 DIAGNOSIS — I25.10 CORONARY ARTERY DISEASE INVOLVING NATIVE CORONARY ARTERY OF NATIVE HEART WITHOUT ANGINA PECTORIS: ICD-10-CM

## 2023-10-30 DIAGNOSIS — I10 ESSENTIAL HYPERTENSION, BENIGN: ICD-10-CM

## 2023-10-30 LAB — LVEF ECHO: NORMAL

## 2023-10-30 PROCEDURE — 93306 TTE W/DOPPLER COMPLETE: CPT | Mod: 26 | Performed by: INTERNAL MEDICINE

## 2023-10-30 PROCEDURE — 93306 TTE W/DOPPLER COMPLETE: CPT

## 2023-11-01 ENCOUNTER — OFFICE VISIT (OUTPATIENT)
Dept: CARDIOLOGY | Facility: CLINIC | Age: 78
End: 2023-11-01
Payer: COMMERCIAL

## 2023-11-01 VITALS
DIASTOLIC BLOOD PRESSURE: 82 MMHG | HEIGHT: 74 IN | SYSTOLIC BLOOD PRESSURE: 162 MMHG | BODY MASS INDEX: 34.22 KG/M2 | HEART RATE: 64 BPM

## 2023-11-01 DIAGNOSIS — I25.10 CORONARY ARTERY DISEASE INVOLVING NATIVE CORONARY ARTERY OF NATIVE HEART WITHOUT ANGINA PECTORIS: Primary | ICD-10-CM

## 2023-11-01 DIAGNOSIS — I35.0 NONRHEUMATIC AORTIC VALVE STENOSIS: ICD-10-CM

## 2023-11-01 DIAGNOSIS — I10 ESSENTIAL HYPERTENSION, BENIGN: ICD-10-CM

## 2023-11-01 DIAGNOSIS — I10 BENIGN ESSENTIAL HYPERTENSION: ICD-10-CM

## 2023-11-01 DIAGNOSIS — R00.1 BRADYCARDIA: ICD-10-CM

## 2023-11-01 PROCEDURE — 99214 OFFICE O/P EST MOD 30 MIN: CPT | Performed by: INTERNAL MEDICINE

## 2023-11-01 RX ORDER — CALCIUM CARBONATE/VITAMIN D3 500-10/5ML
LIQUID (ML) ORAL DAILY
COMMUNITY

## 2023-11-01 RX ORDER — FOLIC ACID 0.8 MG
800 TABLET ORAL DAILY
COMMUNITY

## 2023-11-01 RX ORDER — GUAIFENESIN/PHENYLPROPANOLAMIN
EXPECTORANT ORAL 2 TIMES DAILY
COMMUNITY

## 2023-11-01 RX ORDER — MULTIVIT WITH MINERALS/LUTEIN
1000 TABLET ORAL DAILY
COMMUNITY

## 2023-11-01 RX ORDER — VITAMIN E 268 MG
CAPSULE ORAL DAILY
COMMUNITY

## 2023-11-01 RX ORDER — LANOLIN ALCOHOL/MO/W.PET/CERES
1000 CREAM (GRAM) TOPICAL 2 TIMES DAILY
COMMUNITY

## 2023-11-01 RX ORDER — PLANT STANOL ESTER 450 MG
TABLET ORAL DAILY
COMMUNITY

## 2023-11-01 RX ORDER — HYDROCHLOROTHIAZIDE 12.5 MG/1
25 CAPSULE ORAL DAILY
Qty: 90 CAPSULE | Refills: 11 | Status: SHIPPED | OUTPATIENT
Start: 2023-11-01

## 2023-11-01 RX ORDER — UBIDECARENONE 200 MG
CAPSULE ORAL DAILY
COMMUNITY

## 2023-11-01 NOTE — PROGRESS NOTES
"HISTORY OF PRESENT ILLNESS:  Travis Villa a 78 year old male with coronary artery disease, aortic stenosis, hypertension, type 2 diabetes mellitus, obesity, prostatic hypertrophy, chronic low back pain, and statin intolerance, was seen today at your request for follow-up.     Since last seen the patient underwent right hemicolectomy /umbilical hernia repair for unresectable ( nonmalignant) colonic adenoma 2/2023 and TURP 8/2023 for BPH. In late summer his wife suffered a TIA which caused severe emotional stress. He was seen by our CYDNEY for worsened hypertension in August 2023 and has been recording his blood pressures at home, finding that they usually exceed 140 mmHg systolic. His primary care MD changed discontinued  indapamide /amlodipine while he remains on carvedilol and ramipril. She has recommended a trial of pravastatin and prescribed an SGLT-2 transport inhibitor, neither of which he has started. Follow up with his primary care MD is planned in the near future. The patient admits to significant weight gain which he attributes to inactivity and stress. A TTE was repeated prior to this visit for follow up of aortic stenosis.       PAST MEDICAL HISTORY:  1. Moderate to severe (\"low flow-low gradient aortic stenosis - echocardiogram 10/230/2023 showing ejection fraction of 60% with a valve area of  0.90cm2/ MSG 25 mmHg /DI 0.28 /SVI 28ml/M2cm2   2.  Coronary artery disease.  a.  History of PCI of circumflex on right coronary in 2008.His last regadenoson nuclear stress test in 2020 showed no ischemia with a normal ejection fraction  3.  Obesity.  4.  Hypertension.  5.  Dyslipidemia.  6.  Type 2 diabetes mellitus.  7.  Osteoarthritis - chronic low back pain.  8. Colonic adenoma : unresectable by endoscopy sp right hemicolectomy with umbilical hernia repair 202023.  9 . Prostatic hypertrophy : sp TURP 6/2023    Has gained weight which he attributes to post surgery decreased activity stress with his wife's " TIA    Orders this Visit:  No orders of the defined types were placed in this encounter.    Orders Placed This Encounter   Medications    vitamin C (ASCORBIC ACID) 1000 MG TABS     Sig: Take 1,000 mg by mouth 2 times daily    cyanocobalamin (VITAMIN B-12) 1000 MCG tablet     Sig: Take 1,000 mcg by mouth 2 times daily    Calcium Carbonate-Vitamin D (CALCIUM PLUS VITAMIN D) 300-2.5 MG-MCG CAPS     Sig: Take by mouth 2 times daily    Saw Palmetto 500 MG CAPS     Sig: Take by mouth 2 times daily    folic acid 800 MCG tablet     Sig: Take 800 mcg by mouth daily    Coenzyme Q10 (COQ-10) 200 MG CAPS     Sig: Take by mouth daily    Garlic 1000 MG CAPS     Sig: Take by mouth daily    vitamin E (TOCOPHEROL) 400 units (180 mg) capsule     Sig: Take by mouth daily    RESVERATROL PO     Sig: Take by mouth daily    magnesium oxide 400 MG CAPS     Sig: Take by mouth daily    potassium gluconate 2.5 MEQ tablet     Sig: Take by mouth daily    Berberine Chloride (BERBERINE HCI PO)     Sig: Take by mouth daily    Apple Cider Vinegar 600 MG CAPS     Sig: Take by mouth 2 times daily    CINNAMON PO     Sig: Take 2,000 mg by mouth 2 times daily     There are no discontinued medications.    Encounter Diagnosis   Name Primary?    Bradycardia        CURRENT MEDICATIONS:  Current Outpatient Medications   Medication Sig Dispense Refill    albuterol (PROAIR HFA/PROVENTIL HFA/VENTOLIN HFA) 108 (90 Base) MCG/ACT inhaler Inhale 2 puffs into the lungs every 6 hours as needed for shortness of breath, wheezing or cough      Apple Cider Vinegar 600 MG CAPS Take by mouth 2 times daily      aspirin 81 MG EC tablet Take 1 tablet (81 mg) by mouth 2 times daily Do not resume until post-op day 5 (2/21)      Berberine Chloride (BERBERINE HCI PO) Take by mouth daily      Calcium Carbonate-Vitamin D (CALCIUM PLUS VITAMIN D) 300-2.5 MG-MCG CAPS Take by mouth 2 times daily      carvedilol (COREG) 25 MG tablet Take 1 tablet (25 mg) by mouth 2 times daily (with  meals) 180 tablet 3    Cholecalciferol (VITAMIN D3 PO) Take 1 capsule by mouth at bedtime      CINNAMON PO Take 2,000 mg by mouth 2 times daily      Coenzyme Q10 (COQ-10) 200 MG CAPS Take by mouth daily      cyanocobalamin (VITAMIN B-12) 1000 MCG tablet Take 1,000 mcg by mouth 2 times daily      diclofenac (VOLTAREN) 1 % topical gel Apply topically as needed for moderate pain      fish oil-omega-3 fatty acids 1000 MG capsule Take 1 capsule by mouth 2 times daily (with meals)      Flaxseed, Linseed, OIL Take 1 tablet by mouth daily      folic acid 800 MCG tablet Take 800 mcg by mouth daily      Garlic 1000 MG CAPS Take by mouth daily      GLUCOSAMINE SULFATE PO Take 1 tablet by mouth daily      magnesium oxide 400 MG CAPS Take by mouth daily      metFORMIN (GLUCOPHAGE) 500 MG tablet Take 500 mg by mouth 2 times daily (with meals)       multivitamin w/minerals (THERA-VIT-M) tablet Take 1 tablet by mouth daily      nitroGLYcerin (NITROSTAT) 0.4 MG sublingual tablet Place 1 tablet (0.4 mg) under the tongue every 5 minutes as needed for chest pain 25 tablet 3    oxyCODONE (ROXICODONE) 5 MG tablet Take 1 tablet (5 mg) by mouth every 6 hours as needed for moderate to severe pain 15 tablet 0    potassium gluconate 2.5 MEQ tablet Take by mouth daily      ramipril (ALTACE) 10 MG capsule Take 1 capsule (10 mg) by mouth 2 times daily 180 capsule 3    RESVERATROL PO Take by mouth daily      Saw Palmetto 500 MG CAPS Take by mouth 2 times daily      vitamin C (ASCORBIC ACID) 1000 MG TABS Take 1,000 mg by mouth 2 times daily      vitamin E (TOCOPHEROL) 400 units (180 mg) capsule Take by mouth daily      naproxen sodium (ANAPROX) 220 MG tablet Take 220-440 mg by mouth as needed for moderate pain (4-6) (Patient not taking: Reported on 8/9/2023)      oxyCODONE-acetaminophen (PERCOCET) 5-325 MG tablet Take 1 tablet by mouth every 6 hours as needed for severe pain (7-10) (Patient not taking: Reported on 11/1/2023)      tamsulosin  "(FLOMAX) 0.4 MG capsule Take 0.4 mg by mouth every evening (Patient not taking: Reported on 11/1/2023)      traZODone (DESYREL) 50 MG tablet Take 1 tablet (50 mg) by mouth nightly as needed for sleep (Patient not taking: Reported on 11/1/2023) 10 tablet 0       ALLERGIES     Allergies   Allergen Reactions    Atorvastatin Other (See Comments)     Muscle aches       PAST MEDICAL, SURGICAL, FAMILY, SOCIAL HISTORY:  History was reviewed and updated as needed, see medical record.        Review of Systems:  A 12-point review of systems was completed, see medical record for detailed review of systems information.    Physical Exam:  Vitals: BP (!) 162/82   Pulse 64   Ht 1.88 m (6' 2\")   BMI 34.22 kg/m      Constitutional: No apparent distress    HEENT: pupils equal round reactive to light, thyroid normal size, JVP is normal    Chest: Clear to percussion and auscultation    Cardiac: Normal S1, normal S2 grade 2/6 PRASANNA    Abdomen: Obese. Cannot palpate internal organs. Liver percusses to 6 cm spleen is not palpable aorta is not tender or enlarged    Extremitiies: no edema.          ASSESSMENT:  I suspect the patient will need a diuretic to optimize blood pressure in view of long standing hypertension on  maximal doses of 2 agents. I would start with hydrochlorothiazide and if he fails to reach target 130 mmHg or less could consider spironolactone if renal function/K levels permit. I agree with the recommendation to trial pravastatin and also agree that current guidelines favor SGLT2 transport inhibitors in this setting. The patient appears to be agreeable with filling his prescription for both medications. His aortic stenosis has progressed and he will require close follow up,  but currently he is asymptomatic. Clearly weight gain and lack of physical activity are contributing to worsened blood pressure controlWe reviewed the benefits of a regular exercise program, a Mediterranean-style weight loss diet, and contacting us " for any changes in between now and the time of her next visit.     RECOMMENDATIONS:   1) hydrochlorothiazide 25 mg daily  2) I agree with SGLT 2 Transport inhibitor either empagliflozin or dapaglivlozin ( has a prescription)  3) I agree with a trial of pravastatin   4) Mediterranean style diet and regular exercise ( walking 45 minutes daily)  5) follow up in 6 months with CYDNEY kinkonE  6) Continue to record blood pressures at home and follow up with primary care MD        Recent Lab Results:  LIPID RESULTS:  Lab Results   Component Value Date    CHOL 190 12/06/2017    HDL 32 12/06/2017     12/06/2017    TRIG 229 12/06/2017    CHOLHDLRATIO 3.9 03/13/2014       LIVER ENZYME RESULTS:  Lab Results   Component Value Date    AST 23 07/31/2023    AST 33 12/29/2012    ALT 22 07/31/2023    ALT 6 03/13/2014       CBC RESULTS:  Lab Results   Component Value Date    WBC 9.1 08/04/2023    WBC 7.4 02/27/2019    RBC 5.08 08/04/2023    RBC 5.08 02/27/2019    HGB 13.7 08/04/2023    HGB 14.3 02/27/2019    HCT 43.4 08/04/2023    HCT 42.3 02/27/2019    MCV 85 08/04/2023    MCV 83 02/27/2019    MCH 27.0 08/04/2023    MCH 28.1 02/27/2019    MCHC 31.6 08/04/2023    MCHC 33.8 02/27/2019    RDW 14.2 08/04/2023    RDW 14.3 02/27/2019     08/04/2023     02/27/2019       BMP RESULTS:  Lab Results   Component Value Date     08/04/2023     02/27/2019    POTASSIUM 4.0 08/04/2023    POTASSIUM 4.0 02/27/2019    CHLORIDE 100 08/04/2023    CHLORIDE 96 02/27/2019    CO2 28 08/04/2023    CO2 30 02/27/2019    ANIONGAP 10 08/04/2023    ANIONGAP 8 02/27/2019     (H) 08/04/2023     (H) 02/19/2023     (H) 02/27/2019    BUN 12.2 08/04/2023    BUN 14 02/27/2019    CR 0.52 (L) 08/04/2023    CR 0.46 (L) 02/27/2019    GFRESTIMATED >90 08/04/2023    GFRESTIMATED >60 01/24/2023    GFRESTIMATED >90 02/27/2019    GFRESTBLACK >90 02/27/2019    LEIGH ANN 9.1 08/04/2023    LEIGH ANN 8.9 02/27/2019        A1C RESULTS:  No results  "found for: \"A1C\"    INR RESULTS:  Lab Results   Component Value Date    INR 1.03 05/02/2008    INR 1.04 03/13/2008       We greatly appreciate the opportunity to be involved in the care of your patient, Travis Villa.    Sincerely,  MD VELIA Youngblood PA-C  [address] [place] [address]                                                                     "

## 2023-11-01 NOTE — LETTER
11/1/2023    Marce Loving MD  [facility] [address]  [facility] [address]    RE: Travis Villa       Dear Colleague,     I had the pleasure of seeing Travis Villa in the [facility].  HISTORY OF PRESENT ILLNESS:  Travis Villa a 78 year old male with     Has gained weight which he attributes to post surgery decreased activity stress with his wife's TIA    Orders this Visit:  No orders of the defined types were placed in this encounter.    Orders Placed This Encounter   Medications    vitamin C (ASCORBIC ACID) 1000 MG TABS     Sig: Take 1,000 mg by mouth 2 times daily    cyanocobalamin (VITAMIN B-12) 1000 MCG tablet     Sig: Take 1,000 mcg by mouth 2 times daily    Calcium Carbonate-Vitamin D (CALCIUM PLUS VITAMIN D) 300-2.5 MG-MCG CAPS     Sig: Take by mouth 2 times daily    Saw Palmetto 500 MG CAPS     Sig: Take by mouth 2 times daily    folic acid 800 MCG tablet     Sig: Take 800 mcg by mouth daily    Coenzyme Q10 (COQ-10) 200 MG CAPS     Sig: Take by mouth daily    Garlic 1000 MG CAPS     Sig: Take by mouth daily    vitamin E (TOCOPHEROL) 400 units (180 mg) capsule     Sig: Take by mouth daily    RESVERATROL PO     Sig: Take by mouth daily    magnesium oxide 400 MG CAPS     Sig: Take by mouth daily    potassium gluconate 2.5 MEQ tablet     Sig: Take by mouth daily    Berberine Chloride (BERBERINE HCI PO)     Sig: Take by mouth daily    Apple Cider Vinegar 600 MG CAPS     Sig: Take by mouth 2 times daily    CINNAMON PO     Sig: Take 2,000 mg by mouth 2 times daily     There are no discontinued medications.    Encounter Diagnosis   Name Primary?    Bradycardia        CURRENT MEDICATIONS:  Current Outpatient Medications   Medication Sig Dispense Refill    albuterol (PROAIR HFA/PROVENTIL HFA/VENTOLIN HFA) 108 (90 Base) MCG/ACT inhaler Inhale 2 puffs into the lungs every 6 hours as needed for shortness of breath, wheezing or cough      Apple Cider Vinegar 600 MG CAPS Take  by mouth 2 times daily      aspirin 81 MG EC tablet Take 1 tablet (81 mg) by mouth 2 times daily Do not resume until post-op day 5 (2/21)      Berberine Chloride (BERBERINE HCI PO) Take by mouth daily      Calcium Carbonate-Vitamin D (CALCIUM PLUS VITAMIN D) 300-2.5 MG-MCG CAPS Take by mouth 2 times daily      carvedilol (COREG) 25 MG tablet Take 1 tablet (25 mg) by mouth 2 times daily (with meals) 180 tablet 3    Cholecalciferol (VITAMIN D3 PO) Take 1 capsule by mouth at bedtime      CINNAMON PO Take 2,000 mg by mouth 2 times daily      Coenzyme Q10 (COQ-10) 200 MG CAPS Take by mouth daily      cyanocobalamin (VITAMIN B-12) 1000 MCG tablet Take 1,000 mcg by mouth 2 times daily      diclofenac (VOLTAREN) 1 % topical gel Apply topically as needed for moderate pain      fish oil-omega-3 fatty acids 1000 MG capsule Take 1 capsule by mouth 2 times daily (with meals)      Flaxseed, Linseed, OIL Take 1 tablet by mouth daily      folic acid 800 MCG tablet Take 800 mcg by mouth daily      Garlic 1000 MG CAPS Take by mouth daily      GLUCOSAMINE SULFATE PO Take 1 tablet by mouth daily      magnesium oxide 400 MG CAPS Take by mouth daily      metFORMIN (GLUCOPHAGE) 500 MG tablet Take 500 mg by mouth 2 times daily (with meals)       multivitamin w/minerals (THERA-VIT-M) tablet Take 1 tablet by mouth daily      nitroGLYcerin (NITROSTAT) 0.4 MG sublingual tablet Place 1 tablet (0.4 mg) under the tongue every 5 minutes as needed for chest pain 25 tablet 3    oxyCODONE (ROXICODONE) 5 MG tablet Take 1 tablet (5 mg) by mouth every 6 hours as needed for moderate to severe pain 15 tablet 0    potassium gluconate 2.5 MEQ tablet Take by mouth daily      ramipril (ALTACE) 10 MG capsule Take 1 capsule (10 mg) by mouth 2 times daily 180 capsule 3    RESVERATROL PO Take by mouth daily      Saw Palmetto 500 MG CAPS Take by mouth 2 times daily      vitamin C (ASCORBIC ACID) 1000 MG TABS Take 1,000 mg by mouth 2 times daily      vitamin E  "(TOCOPHEROL) 400 units (180 mg) capsule Take by mouth daily      naproxen sodium (ANAPROX) 220 MG tablet Take 220-440 mg by mouth as needed for moderate pain (4-6) (Patient not taking: Reported on 8/9/2023)      oxyCODONE-acetaminophen (PERCOCET) 5-325 MG tablet Take 1 tablet by mouth every 6 hours as needed for severe pain (7-10) (Patient not taking: Reported on 11/1/2023)      tamsulosin (FLOMAX) 0.4 MG capsule Take 0.4 mg by mouth every evening (Patient not taking: Reported on 11/1/2023)      traZODone (DESYREL) 50 MG tablet Take 1 tablet (50 mg) by mouth nightly as needed for sleep (Patient not taking: Reported on 11/1/2023) 10 tablet 0       ALLERGIES     Allergies   Allergen Reactions    Atorvastatin Other (See Comments)     Muscle aches       PAST MEDICAL, SURGICAL, FAMILY, SOCIAL HISTORY:  History was reviewed and updated as needed, see medical record.        Review of Systems:  A 12-point review of systems was completed, see medical record for detailed review of systems information.    Physical Exam:  Vitals: BP (!) 162/82   Pulse 64   Ht 1.88 m (6' 2\")   BMI 34.22 kg/m      Constitutional: No apparent distress    HEENT: pupils equal round reactive to light, thyroid normal size, JVP is normal    Chest: Clear to percussion and auscultation    Cardiac: Normal S1, normal S2 no S3, no murmur or click    Abdomen: Scaphoid.  Liver percusses to 6 cm spleen is not palpable aorta is not tender or enlarged    Extremitiies: no edema.    Neurological:  Cranial nerves II through XII are intact, strength equal and symmetrical, displays normal insight and judgment        ASSESSMENT: Travis Villa is a 78 year old male with          We reviewed the benefits of a regular exercise program, a Mediterranean-style weight loss diet, and contacting us for any changes in between now and the time of her next visit.     RECOMMENDATIONS:   1) hydrochlorothiazide 25 mg daily  2) I agree with SGLT 2 Transport inhibitor either " "empagliflozin or dapaglivlozin ( has a prescription)  3) I agree with a trial of pravastatin   4) Mediterranean style diet and regular exercise ( walking 45 minutes daily)  5) follow up in 6 months with CYDNEY TTE        Recent Lab Results:  LIPID RESULTS:  Lab Results   Component Value Date    CHOL 190 12/06/2017    HDL 32 12/06/2017     12/06/2017    TRIG 229 12/06/2017    CHOLHDLRATIO 3.9 03/13/2014       LIVER ENZYME RESULTS:  Lab Results   Component Value Date    AST 23 07/31/2023    AST 33 12/29/2012    ALT 22 07/31/2023    ALT 6 03/13/2014       CBC RESULTS:  Lab Results   Component Value Date    WBC 9.1 08/04/2023    WBC 7.4 02/27/2019    RBC 5.08 08/04/2023    RBC 5.08 02/27/2019    HGB 13.7 08/04/2023    HGB 14.3 02/27/2019    HCT 43.4 08/04/2023    HCT 42.3 02/27/2019    MCV 85 08/04/2023    MCV 83 02/27/2019    MCH 27.0 08/04/2023    MCH 28.1 02/27/2019    MCHC 31.6 08/04/2023    MCHC 33.8 02/27/2019    RDW 14.2 08/04/2023    RDW 14.3 02/27/2019     08/04/2023     02/27/2019       BMP RESULTS:  Lab Results   Component Value Date     08/04/2023     02/27/2019    POTASSIUM 4.0 08/04/2023    POTASSIUM 4.0 02/27/2019    CHLORIDE 100 08/04/2023    CHLORIDE 96 02/27/2019    CO2 28 08/04/2023    CO2 30 02/27/2019    ANIONGAP 10 08/04/2023    ANIONGAP 8 02/27/2019     (H) 08/04/2023     (H) 02/19/2023     (H) 02/27/2019    BUN 12.2 08/04/2023    BUN 14 02/27/2019    CR 0.52 (L) 08/04/2023    CR 0.46 (L) 02/27/2019    GFRESTIMATED >90 08/04/2023    GFRESTIMATED >60 01/24/2023    GFRESTIMATED >90 02/27/2019    GFRESTBLACK >90 02/27/2019    LEIGH ANN 9.1 08/04/2023    LEIGH ANN 8.9 02/27/2019        A1C RESULTS:  No results found for: \"A1C\"    INR RESULTS:  Lab Results   Component Value Date    INR 1.03 05/02/2008    INR 1.04 03/13/2008       We greatly appreciate the opportunity to be involved in the care of your patient, Travis Villa.    Sincerely,  Ambrocio Valdez, " MD      CC  Timi Thomas, PA-C  [address] [place] [address]

## 2023-12-14 DIAGNOSIS — I10 ESSENTIAL HYPERTENSION, BENIGN: ICD-10-CM

## 2023-12-14 RX ORDER — RAMIPRIL 10 MG/1
10 CAPSULE ORAL 2 TIMES DAILY
Qty: 180 CAPSULE | Refills: 3 | Status: SHIPPED | OUTPATIENT
Start: 2023-12-14

## 2024-01-11 ENCOUNTER — TELEPHONE (OUTPATIENT)
Dept: CARDIOLOGY | Facility: CLINIC | Age: 79
End: 2024-01-11
Payer: COMMERCIAL

## 2024-01-11 DIAGNOSIS — I10 ESSENTIAL HYPERTENSION, BENIGN: Primary | ICD-10-CM

## 2024-01-11 DIAGNOSIS — I25.10 CORONARY ARTERY DISEASE INVOLVING NATIVE CORONARY ARTERY OF NATIVE HEART WITHOUT ANGINA PECTORIS: ICD-10-CM

## 2024-01-16 DIAGNOSIS — I25.10 CORONARY ARTERY DISEASE INVOLVING NATIVE CORONARY ARTERY OF NATIVE HEART WITHOUT ANGINA PECTORIS: ICD-10-CM

## 2024-01-16 DIAGNOSIS — R07.9 CHEST PAIN, UNSPECIFIED TYPE: ICD-10-CM

## 2024-01-16 DIAGNOSIS — I10 ESSENTIAL HYPERTENSION, BENIGN: ICD-10-CM

## 2024-01-16 RX ORDER — CARVEDILOL 25 MG/1
25 TABLET ORAL 2 TIMES DAILY WITH MEALS
Qty: 180 TABLET | Refills: 4 | Status: SHIPPED | OUTPATIENT
Start: 2024-01-16

## 2024-01-18 NOTE — TELEPHONE ENCOUNTER
Called pt to review SGLT2 pricing information per request of Dr. Cano, no answer. Left VM requesting call back to Team 4. Per Dr. Cano pt would like to see him when Dr. Cano sees his wife around July/August 2024.     ----- Message -----  From: July Velazquez  Sent: 1/10/2024  11:53 AM CST  To: Whitney Sylvester RN  Subject: RE: SGLT2 question                              Patient has Medicare Advantage through [employer].    Farxiga is not covered.    Jardiance:  --$35/mo ($70 for 3 mo at [facility])  --lf/when total drug costs exceed $5,030, price will increase to a 25% coinsurance, equivalent to $146/mo.    July Velazquez  Pharmacy Technician/Liaison, Discharge Pharmacy  [phone] (voice or text)  [email or web address]      ----- Message -----  From: Kit Cano MD  Sent: 1/10/2024  10:59 AM CST  To: [facility] Team 4  Subject: SGLT2 question                                  Hi all,    Would you mind putting in a pharmacy liaison consult for SGLT2 pricing for Mr. Villa?  He is the  of my patient (Wendy), and is going to start seeing me.  The Farxiga is costing him $200 a month.    Thanks,  Ti   
Received response from Dr. Cano:     Kit Cano MD Hair, [place] W RN  Caller: Unspecified (1 week ago,  3:40 PM)  Given similar expected effects between Jardiance and Farxiga, it is OK for him to continue the metformin 500 mg BID.  We probably don't have an absolute need for labs after making the change either, but I think a BMP in 1-2 weeks is probably a good idea.      Thanks,  Ti    Called pt, reviewed recommendations. Pt clarified he never actually started Farxiga due to cost but this was recommended to him by his PCP. Advised pt should let his PCP know he is starting Jardiance instead of Farxiga and see if they would like him to schedule follow up regarding his diabetes. Placed order for BMP, pt stated he will call to schedule.   
Spoke with pt, reviewed information from pharmacy liaison and that Farxiga is not covered and Jardiance is expected to cost $35/month. Pt stated he is OK switching to Jardiance but he would like to confirm he should continue his metformin for diabetes or if he needs to see his PCP to discuss, also if any lab work is needed after the switch. Pt is taking metformin 500 mg BID. Pt would like his prescription sent to [facility] in [place]. Will review with Dr. Cano.     Pt also requested to be scheduled with Dr. Cano on 7/2/24 when his wife is scheduled to see Dr. Cano. Appointment held at 12:15 pm and message sent to scheduling.   
never used

## 2024-05-01 ENCOUNTER — HOSPITAL ENCOUNTER (OUTPATIENT)
Dept: CARDIOLOGY | Facility: CLINIC | Age: 79
Discharge: HOME | End: 2024-05-01
Attending: INTERNAL MEDICINE | Admitting: INTERNAL MEDICINE
Payer: COMMERCIAL

## 2024-05-01 DIAGNOSIS — R00.1 BRADYCARDIA: ICD-10-CM

## 2024-05-01 DIAGNOSIS — I35.0 NONRHEUMATIC AORTIC VALVE STENOSIS: ICD-10-CM

## 2024-05-01 DIAGNOSIS — I10 BENIGN ESSENTIAL HYPERTENSION: ICD-10-CM

## 2024-05-01 DIAGNOSIS — I25.10 CORONARY ARTERY DISEASE INVOLVING NATIVE CORONARY ARTERY OF NATIVE HEART WITHOUT ANGINA PECTORIS: ICD-10-CM

## 2024-05-01 DIAGNOSIS — I10 ESSENTIAL HYPERTENSION, BENIGN: ICD-10-CM

## 2024-05-01 LAB — LVEF ECHO: NORMAL

## 2024-05-01 PROCEDURE — 93306 TTE W/DOPPLER COMPLETE: CPT

## 2024-05-01 PROCEDURE — 93306 TTE W/DOPPLER COMPLETE: CPT | Mod: 26 | Performed by: INTERNAL MEDICINE

## 2024-05-06 ENCOUNTER — OFFICE VISIT (OUTPATIENT)
Dept: CARDIOLOGY | Facility: CLINIC | Age: 79
End: 2024-05-06
Payer: COMMERCIAL

## 2024-05-06 VITALS
DIASTOLIC BLOOD PRESSURE: 74 MMHG | SYSTOLIC BLOOD PRESSURE: 132 MMHG | BODY MASS INDEX: 33.75 KG/M2 | HEIGHT: 74 IN | HEART RATE: 69 BPM | WEIGHT: 263 LBS | OXYGEN SATURATION: 92 %

## 2024-05-06 DIAGNOSIS — E11.9 TYPE 2 DIABETES MELLITUS WITHOUT COMPLICATION, WITHOUT LONG-TERM CURRENT USE OF INSULIN (H): ICD-10-CM

## 2024-05-06 DIAGNOSIS — I25.10 CORONARY ARTERY DISEASE INVOLVING NATIVE CORONARY ARTERY OF NATIVE HEART WITHOUT ANGINA PECTORIS: Primary | ICD-10-CM

## 2024-05-06 DIAGNOSIS — I10 ESSENTIAL HYPERTENSION, BENIGN: ICD-10-CM

## 2024-05-06 DIAGNOSIS — I35.0 NONRHEUMATIC AORTIC VALVE STENOSIS: ICD-10-CM

## 2024-05-06 DIAGNOSIS — E78.2 MIXED HYPERLIPIDEMIA: ICD-10-CM

## 2024-05-06 PROCEDURE — 99214 OFFICE O/P EST MOD 30 MIN: CPT | Performed by: INTERNAL MEDICINE

## 2024-05-06 RX ORDER — EZETIMIBE 10 MG/1
10 TABLET ORAL DAILY
Qty: 90 TABLET | Refills: 3 | Status: SHIPPED | OUTPATIENT
Start: 2024-05-06 | End: 2024-07-03 | Stop reason: SINTOL

## 2024-05-06 NOTE — LETTER
5/6/2024    Marce Loving MD  [address]  [address]  [facility] [address]    RE: Travis Villa       Dear Colleague,     I had the pleasure of seeing Travis Villa in the [facility].  CARDIOLOGY CLINIC FOLLOW-UP NOTE      REASON FOR VISIT:   Follow-up aortic stenosis, CAD    PRIMARY CARE PHYSICIAN:  Marce Loving        History of Present Illness  Travis Villa is an extremely pleasant 78 year old male here for routine follow-up.  I also see his wife, Wendy, in clinic.  His medical history is significant for moderate aortic stenosis, CAD s/p RCA and LCx PCI in 2008, severe statin intolerance, ascending aortic dilatation, obesity, hypertension, dyslipidemia, DM2, colon adenoma s/p right hemicolectomy with umbilical hernia repair in 2/2023, prostate hypertrophy s/p TURP in 8/2023, and osteoarthritis.  He is a never smoker.    Since his last visit with Dr. Valdez in 11/2023, he reports that he has been doing well from a cardiac standpoint.  He did try pravastatin at a low dose, but even this caused intolerable muscle aches.  He has also had intolerable muscle aches with rosuvastatin and the worse effects with atorvastatin.  He was also started on Jardiance, and has been tolerating this without issue.  His blood pressure was high on initial check today at 154/91, but recheck by me was 132/74.  No chest pain or other complaints at present.    His most recent labs are from 4/22/2024, showing normal sodium and potassium, normal renal function, A1c of 7.2%, and in 3/2024 he had a CBC showing hemoglobin 13.6 with normal platelets, and a lipid panel with a total cholesterol 188, HDL 33, , and triglycerides 153.  His most recent echo from 5/1/2024 showed normal biventricular size and function with moderate aortic stenosis (MG 29 mmHg, V-max 3.5 m/s, DI 0.38, BISMARK 1.5 cm ), and his ascending aorta measured 4.0 cm.  His most recent ischemic evaluation was a Lexiscan MPI from 9/2020 showing a  small area of nontransmural infarction in the mid to basal inferior segment with no surrounding ischemia.        Assessment & Plan    Moderate aortic stenosis (MG 29 mmHg, V-max 3.5 m/s, DI 0.38, BISMARK 1.5 cm  on 5/2024 TTE), asymptomatic  CAD s/p RCA and LCx PCI in 2008, with CCS 0 angina at present  No ischemia on 9/2020 Lexiscan MPI  Severe statin intolerance (failed rosuvastatin, atorvastatin, and pravastatin)  Ascending aortic dilatation (4.0 cm on 5/2024 TTE)  Hypertension, with reasonable control   Dyslipidemia, poorly controlled at present   NIDDM2  Colon adenoma s/p right hemicolectomy with umbilical hernia repair in 2/2023  Prostate hypertrophy s/p TURP in 8/2023  Osteoarthritis  Never smoker      It was a pleasure to speak with Lele in clinic today.  We discussed several issues today, including reviewing his echo which showed thankfully only moderate aortic stenosis (his prior study had poor image quality and likely underestimated the true valve area).  This fits more with his symptoms as well.  We will plan a repeat echo for another 6 months from now (late 2024).  His blood pressure was much better on recheck, and he tells me that it is well-controlled at home as well, so we will continue his current antihypertensive regimen.  He has clearly failed numerous statins, so we will not try this any longer.  We will give Zetia to try, and we talked about potential side effects of this.  If he does not tolerate the Zetia, or does not reach his LDL goal of 70 or less, then I think that a PCSK9 inhibitor is appropriate.      -Start Zetia 10 mg daily  -Repeat lipid panel/ALT in 2-3 months  -May consider PCSK9 inhibitor if needed as described above  -Continue aspirin 81 mg daily  -Continue current antihypertensive regimen (carvedilol 25 mg twice daily, HCTZ 12.5 mg daily, ramipril 10 mg twice daily)  -Continue Jardiance  -Heart healthy diet, regular aerobic exercise      Follow-up: As previously scheduled with me in  July 2024, with above lipid panel beforehand        Kit Cano MD  Interventional Cardiology  May 6, 2024        Medications  Current Outpatient Medications   Medication Sig Dispense Refill    albuterol (PROAIR HFA/PROVENTIL HFA/VENTOLIN HFA) 108 (90 Base) MCG/ACT inhaler Inhale 2 puffs into the lungs every 6 hours as needed for shortness of breath, wheezing or cough      aspirin 81 MG EC tablet Take 81 mg by mouth 2 times daily      Berberine Chloride (BERBERINE HCI PO) Take by mouth daily Helps regulate blood sugars      carvedilol (COREG) 25 MG tablet Take 1 tablet (25 mg) by mouth 2 times daily (with meals) 180 tablet 4    Cholecalciferol (VITAMIN D3 PO) Take 5,000 Units by mouth 4 times daily      CINNAMON PO Take 2,000 mg by mouth 2 times daily      Coenzyme Q10 (COQ-10) 200 MG CAPS Take by mouth daily      diclofenac (VOLTAREN) 1 % topical gel Apply topically as needed for moderate pain      empagliflozin (JARDIANCE) 10 MG TABS tablet Take 1 tablet (10 mg) by mouth daily 90 tablet 1    ezetimibe (ZETIA) 10 MG tablet Take 1 tablet (10 mg) by mouth daily 90 tablet 3    fish oil-omega-3 fatty acids 1000 MG capsule Take 1 capsule by mouth 2 times daily (with meals)      folic acid 800 MCG tablet Take 800 mcg by mouth daily      GLUCOSAMINE SULFATE PO Take 1 tablet by mouth daily      hydrochlorothiazide (MICROZIDE) 12.5 MG capsule Take 2 capsules (25 mg) by mouth daily (Patient taking differently: Take 12.5 mg by mouth daily Takes 1 tablet 12.5mg daily) 90 capsule 11    magnesium oxide 400 MG CAPS Take by mouth daily      metFORMIN (GLUCOPHAGE) 500 MG tablet Take 500 mg by mouth 2 times daily (with meals)       multivitamin w/minerals (THERA-VIT-M) tablet Take 1 tablet by mouth daily      nitroGLYcerin (NITROSTAT) 0.4 MG sublingual tablet Place 1 tablet (0.4 mg) under the tongue every 5 minutes as needed for chest pain 25 tablet 3    potassium gluconate 2.5 MEQ tablet Take by mouth daily      ramipril  (ALTACE) 10 MG capsule Take 1 capsule (10 mg) by mouth 2 times daily 180 capsule 3    RESVERATROL PO Take by mouth daily      vitamin B-Complex Take 1 tablet by mouth daily      vitamin C (ASCORBIC ACID) 1000 MG TABS Take 1,000 mg by mouth daily      vitamin E (TOCOPHEROL) 400 units (180 mg) capsule Take by mouth daily      Apple Cider Vinegar 600 MG CAPS Take by mouth 2 times daily (Patient not taking: Reported on 5/6/2024)      cyanocobalamin (VITAMIN B-12) 1000 MCG tablet Take 1,000 mcg by mouth 2 times daily (Patient not taking: Reported on 5/6/2024)      Flaxseed, Linseed, OIL Take 1 tablet by mouth daily (Patient not taking: Reported on 5/6/2024)      Garlic 1000 MG CAPS Take by mouth daily (Patient not taking: Reported on 5/6/2024)      naproxen sodium (ANAPROX) 220 MG tablet Take 220-440 mg by mouth as needed for moderate pain (4-6) (Patient not taking: Reported on 8/9/2023)      oxyCODONE (ROXICODONE) 5 MG tablet Take 1 tablet (5 mg) by mouth every 6 hours as needed for moderate to severe pain (Patient not taking: Reported on 5/6/2024) 15 tablet 0    oxyCODONE-acetaminophen (PERCOCET) 5-325 MG tablet Take 1 tablet by mouth every 6 hours as needed for severe pain (7-10) (Patient not taking: Reported on 11/1/2023)      Saw Palmetto 500 MG CAPS Take by mouth 2 times daily (Patient not taking: Reported on 5/6/2024)      tamsulosin (FLOMAX) 0.4 MG capsule Take 0.4 mg by mouth every evening (Patient not taking: Reported on 11/1/2023)      traZODone (DESYREL) 50 MG tablet Take 1 tablet (50 mg) by mouth nightly as needed for sleep (Patient not taking: Reported on 11/1/2023) 10 tablet 0     No current facility-administered medications for this visit.     Allergies  Allergies   Allergen Reactions    Atorvastatin Other (See Comments)     Muscle aches    Seasonal Allergies          Physical Exam      BP: (!) 154/91 Pulse: 69     SpO2: 92 %      Vital Signs with Ranges  Pulse:  [69] 69  BP: (154)/(91) 154/91  SpO2:  [92  %] 92 %  263 lbs 0 oz    Constitutional: Well-appearing, no acute distress  Respiratory: Normal respiratory effort, CTAB  Cardiovascular: RRR, 3/6 mid peaking systolic murmur at the RUSB.  JVP difficult to assess.  There is 1+ bilateral LE edema.  Normal carotid upstrokes, no carotid bruits.      Thank you for allowing me to participate in the care of your patient.      Sincerely,     Kit Cano MD     [facility]  cc:   No referring provider defined for this encounter.

## 2024-05-06 NOTE — PROGRESS NOTES
CARDIOLOGY CLINIC FOLLOW-UP NOTE      REASON FOR VISIT:   Follow-up aortic stenosis, CAD    PRIMARY CARE PHYSICIAN:  Marce Loving        History of Present Illness   Travis Villa is an extremely pleasant 78 year old male here for routine follow-up.  I also see his wife, Wendy, in clinic.  His medical history is significant for moderate aortic stenosis, CAD s/p RCA and LCx PCI in 2008, severe statin intolerance, ascending aortic dilatation, obesity, hypertension, dyslipidemia, DM2, colon adenoma s/p right hemicolectomy with umbilical hernia repair in 2/2023, prostate hypertrophy s/p TURP in 8/2023, and osteoarthritis.  He is a never smoker.    Since his last visit with Dr. Valdez in 11/2023, he reports that he has been doing well from a cardiac standpoint.  He did try pravastatin at a low dose, but even this caused intolerable muscle aches.  He has also had intolerable muscle aches with rosuvastatin and the worse effects with atorvastatin.  He was also started on Jardiance, and has been tolerating this without issue.  His blood pressure was high on initial check today at 154/91, but recheck by me was 132/74.  No chest pain or other complaints at present.    His most recent labs are from 4/22/2024, showing normal sodium and potassium, normal renal function, A1c of 7.2%, and in 3/2024 he had a CBC showing hemoglobin 13.6 with normal platelets, and a lipid panel with a total cholesterol 188, HDL 33, , and triglycerides 153.  His most recent echo from 5/1/2024 showed normal biventricular size and function with moderate aortic stenosis (MG 29 mmHg, V-max 3.5 m/s, DI 0.38, BISMARK 1.5 cm ), and his ascending aorta measured 4.0 cm.  His most recent ischemic evaluation was a Lexiscan MPI from 9/2020 showing a small area of nontransmural infarction in the mid to basal inferior segment with no surrounding ischemia.        Assessment & Plan     Moderate aortic stenosis (MG 29 mmHg, V-max 3.5 m/s, DI 0.38, BISMARK 1.5 cm   on 5/2024 TTE), asymptomatic  CAD s/p RCA and LCx PCI in 2008, with CCS 0 angina at present  No ischemia on 9/2020 Lexiscan MPI  Severe statin intolerance (failed rosuvastatin, atorvastatin, and pravastatin)  Ascending aortic dilatation (4.0 cm on 5/2024 TTE)  Hypertension, with reasonable control   Dyslipidemia, poorly controlled at present   NIDDM2  Colon adenoma s/p right hemicolectomy with umbilical hernia repair in 2/2023  Prostate hypertrophy s/p TURP in 8/2023  Osteoarthritis  Never smoker      It was a pleasure to speak with Lele in clinic today.  We discussed several issues today, including reviewing his echo which showed thankfully only moderate aortic stenosis (his prior study had poor image quality and likely underestimated the true valve area).  This fits more with his symptoms as well.  We will plan a repeat echo for another 6 months from now (late 2024).  His blood pressure was much better on recheck, and he tells me that it is well-controlled at home as well, so we will continue his current antihypertensive regimen.  He has clearly failed numerous statins, so we will not try this any longer.  We will give Zetia to try, and we talked about potential side effects of this.  If he does not tolerate the Zetia, or does not reach his LDL goal of 70 or less, then I think that a PCSK9 inhibitor is appropriate.      -Start Zetia 10 mg daily  -Repeat lipid panel/ALT in 2-3 months  -May consider PCSK9 inhibitor if needed as described above  -Continue aspirin 81 mg daily  -Continue current antihypertensive regimen (carvedilol 25 mg twice daily, HCTZ 12.5 mg daily, ramipril 10 mg twice daily)  -Continue Jardiance  -Heart healthy diet, regular aerobic exercise      Follow-up: As previously scheduled with me in July 2024, with above lipid panel beforehand        Kit Cano MD  Interventional Cardiology  May 6, 2024        Medications   Current Outpatient Medications   Medication Sig Dispense Refill     albuterol (PROAIR HFA/PROVENTIL HFA/VENTOLIN HFA) 108 (90 Base) MCG/ACT inhaler Inhale 2 puffs into the lungs every 6 hours as needed for shortness of breath, wheezing or cough      aspirin 81 MG EC tablet Take 81 mg by mouth 2 times daily      Berberine Chloride (BERBERINE HCI PO) Take by mouth daily Helps regulate blood sugars      carvedilol (COREG) 25 MG tablet Take 1 tablet (25 mg) by mouth 2 times daily (with meals) 180 tablet 4    Cholecalciferol (VITAMIN D3 PO) Take 5,000 Units by mouth 4 times daily      CINNAMON PO Take 2,000 mg by mouth 2 times daily      Coenzyme Q10 (COQ-10) 200 MG CAPS Take by mouth daily      diclofenac (VOLTAREN) 1 % topical gel Apply topically as needed for moderate pain      empagliflozin (JARDIANCE) 10 MG TABS tablet Take 1 tablet (10 mg) by mouth daily 90 tablet 1    ezetimibe (ZETIA) 10 MG tablet Take 1 tablet (10 mg) by mouth daily 90 tablet 3    fish oil-omega-3 fatty acids 1000 MG capsule Take 1 capsule by mouth 2 times daily (with meals)      folic acid 800 MCG tablet Take 800 mcg by mouth daily      GLUCOSAMINE SULFATE PO Take 1 tablet by mouth daily      hydrochlorothiazide (MICROZIDE) 12.5 MG capsule Take 2 capsules (25 mg) by mouth daily (Patient taking differently: Take 12.5 mg by mouth daily Takes 1 tablet 12.5mg daily) 90 capsule 11    magnesium oxide 400 MG CAPS Take by mouth daily      metFORMIN (GLUCOPHAGE) 500 MG tablet Take 500 mg by mouth 2 times daily (with meals)       multivitamin w/minerals (THERA-VIT-M) tablet Take 1 tablet by mouth daily      nitroGLYcerin (NITROSTAT) 0.4 MG sublingual tablet Place 1 tablet (0.4 mg) under the tongue every 5 minutes as needed for chest pain 25 tablet 3    potassium gluconate 2.5 MEQ tablet Take by mouth daily      ramipril (ALTACE) 10 MG capsule Take 1 capsule (10 mg) by mouth 2 times daily 180 capsule 3    RESVERATROL PO Take by mouth daily      vitamin B-Complex Take 1 tablet by mouth daily      vitamin C (ASCORBIC ACID)  1000 MG TABS Take 1,000 mg by mouth daily      vitamin E (TOCOPHEROL) 400 units (180 mg) capsule Take by mouth daily      Apple Cider Vinegar 600 MG CAPS Take by mouth 2 times daily (Patient not taking: Reported on 5/6/2024)      cyanocobalamin (VITAMIN B-12) 1000 MCG tablet Take 1,000 mcg by mouth 2 times daily (Patient not taking: Reported on 5/6/2024)      Flaxseed, Linseed, OIL Take 1 tablet by mouth daily (Patient not taking: Reported on 5/6/2024)      Garlic 1000 MG CAPS Take by mouth daily (Patient not taking: Reported on 5/6/2024)      naproxen sodium (ANAPROX) 220 MG tablet Take 220-440 mg by mouth as needed for moderate pain (4-6) (Patient not taking: Reported on 8/9/2023)      oxyCODONE (ROXICODONE) 5 MG tablet Take 1 tablet (5 mg) by mouth every 6 hours as needed for moderate to severe pain (Patient not taking: Reported on 5/6/2024) 15 tablet 0    oxyCODONE-acetaminophen (PERCOCET) 5-325 MG tablet Take 1 tablet by mouth every 6 hours as needed for severe pain (7-10) (Patient not taking: Reported on 11/1/2023)      Saw Palmetto 500 MG CAPS Take by mouth 2 times daily (Patient not taking: Reported on 5/6/2024)      tamsulosin (FLOMAX) 0.4 MG capsule Take 0.4 mg by mouth every evening (Patient not taking: Reported on 11/1/2023)      traZODone (DESYREL) 50 MG tablet Take 1 tablet (50 mg) by mouth nightly as needed for sleep (Patient not taking: Reported on 11/1/2023) 10 tablet 0     No current facility-administered medications for this visit.     Allergies   Allergies   Allergen Reactions    Atorvastatin Other (See Comments)     Muscle aches    Seasonal Allergies          Physical Exam       BP: (!) 154/91 Pulse: 69     SpO2: 92 %      Vital Signs with Ranges  Pulse:  [69] 69  BP: (154)/(91) 154/91  SpO2:  [92 %] 92 %  263 lbs 0 oz    Constitutional: Well-appearing, no acute distress  Respiratory: Normal respiratory effort, CTAB  Cardiovascular: RRR, 3/6 mid peaking systolic murmur at the RUSB.  JVP  difficult to assess.  There is 1+ bilateral LE edema.  Normal carotid upstrokes, no carotid bruits.

## 2024-05-26 ENCOUNTER — HEALTH MAINTENANCE LETTER (OUTPATIENT)
Age: 79
End: 2024-05-26

## 2024-06-25 ENCOUNTER — LAB (OUTPATIENT)
Dept: LAB | Facility: CLINIC | Age: 79
End: 2024-06-25
Payer: COMMERCIAL

## 2024-06-25 DIAGNOSIS — I25.10 CORONARY ARTERY DISEASE INVOLVING NATIVE CORONARY ARTERY OF NATIVE HEART WITHOUT ANGINA PECTORIS: ICD-10-CM

## 2024-06-25 LAB
ALT SERPL W P-5'-P-CCNC: 21 U/L (ref 0–70)
ANION GAP SERPL CALCULATED.3IONS-SCNC: 9 MMOL/L (ref 7–15)
BUN SERPL-MCNC: 14.1 MG/DL (ref 8–23)
CALCIUM SERPL-MCNC: 9.3 MG/DL (ref 8.8–10.2)
CHLORIDE SERPL-SCNC: 99 MMOL/L (ref 98–107)
CHOLEST SERPL-MCNC: 180 MG/DL
CREAT SERPL-MCNC: 0.52 MG/DL (ref 0.67–1.17)
DEPRECATED HCO3 PLAS-SCNC: 31 MMOL/L (ref 22–29)
EGFRCR SERPLBLD CKD-EPI 2021: >90 ML/MIN/1.73M2
FASTING STATUS PATIENT QL REPORTED: YES
GLUCOSE SERPL-MCNC: 144 MG/DL (ref 70–99)
HDLC SERPL-MCNC: 35 MG/DL
LDLC SERPL CALC-MCNC: 112 MG/DL
NONHDLC SERPL-MCNC: 145 MG/DL
POTASSIUM SERPL-SCNC: 4 MMOL/L (ref 3.4–5.3)
SODIUM SERPL-SCNC: 139 MMOL/L (ref 135–145)
TRIGL SERPL-MCNC: 165 MG/DL

## 2024-06-25 PROCEDURE — 84460 ALANINE AMINO (ALT) (SGPT): CPT | Performed by: INTERNAL MEDICINE

## 2024-06-25 PROCEDURE — 36415 COLL VENOUS BLD VENIPUNCTURE: CPT | Performed by: INTERNAL MEDICINE

## 2024-06-25 PROCEDURE — 80061 LIPID PANEL: CPT | Performed by: INTERNAL MEDICINE

## 2024-06-25 PROCEDURE — 80048 BASIC METABOLIC PNL TOTAL CA: CPT | Performed by: INTERNAL MEDICINE

## 2024-07-03 ENCOUNTER — TELEPHONE (OUTPATIENT)
Dept: CARDIOLOGY | Facility: CLINIC | Age: 79
End: 2024-07-03
Payer: COMMERCIAL

## 2024-07-03 DIAGNOSIS — E78.2 MIXED HYPERLIPIDEMIA: ICD-10-CM

## 2024-07-03 DIAGNOSIS — I25.10 CORONARY ARTERY DISEASE INVOLVING NATIVE CORONARY ARTERY OF NATIVE HEART WITHOUT ANGINA PECTORIS: Primary | ICD-10-CM

## 2024-07-03 NOTE — TELEPHONE ENCOUNTER
Received message from Dr. Cano:       Kit Cano MD [facility] Heart Team 4  Hi all,    I was supposed to see Mr. Villa in clinic today along with his wife, but for some reason this got cancelled and I only saw Wendy.  Lele was not happy about this but was understanding after we talked.  Anyway, it sounds like he has been having side effects with the Zetia.  I told him we will stop this, and check into PCSK9 inhibitor pricing.  Would you mind reaching out to pharmacy about this, and then contact Lele with the pricing?  Presuming he is OK with the cost, I am fine with either agent.  Then we will want to see him back in 3 months, with repeat Lipids/ALT beforehand.      Thanks!  Ti    Zetia removed from medication list. Last  on 6/25/24. Pt with CAD, LDL goal < 70. Prescription ordered for Repatha. Routing to PA team to initiate prior authorization.

## 2024-07-03 NOTE — TELEPHONE ENCOUNTER
Central Prior Authorization Team   Phone: [phone]    PA Initiation    Medication: Repatha 140mg/ml  Insurance Company: [employer] - Phone [phone] Fax [phone]  Pharmacy Filling the Rx: [id number] - [place], [place] - [address]  Filling Pharmacy Phone: [phone]  Filling Pharmacy Fax:    Start Date: 7/3/2024

## 2024-07-05 NOTE — TELEPHONE ENCOUNTER
Prior Authorization Approval    Authorization Effective Date: 7/3/2024  Authorization Expiration Date: 7/3/2025  Medication: Repatha 140mg/ml  Approved Dose/Quantity:   Reference #:     Insurance Company: [employer] - Phone [phone] Fax [phone]  Expected CoPay:       CoPay Card Available:      Foundation Assistance Needed:    Which Pharmacy is filling the prescription (Not needed for infusion/clinic administered): [id number] - [place], [place] - [address]  Pharmacy Notified:  yes  Patient Notified:  yes- Pharmacy will contact patient when ready to /ship

## 2024-07-10 NOTE — TELEPHONE ENCOUNTER
Called and spoke with pt, discussed medication has been approved. Advised Dr. Cano recommends pt have lipids/ALT checked after being on Repatha for 3 months. Orders placed for labs. Follow up is already scheduled with Dr. Cano on 11/5/24. Sent pt [employer] message with link to video on how to administer Repatha. Requested pt contact us if any further questions. Pt verbalized understanding and agreement with plan.

## 2024-07-11 DIAGNOSIS — I25.10 CORONARY ARTERY DISEASE INVOLVING NATIVE CORONARY ARTERY OF NATIVE HEART WITHOUT ANGINA PECTORIS: ICD-10-CM

## 2024-10-28 ENCOUNTER — LAB (OUTPATIENT)
Dept: LAB | Facility: CLINIC | Age: 79
End: 2024-10-28
Payer: COMMERCIAL

## 2024-10-28 DIAGNOSIS — E78.2 MIXED HYPERLIPIDEMIA: ICD-10-CM

## 2024-10-28 DIAGNOSIS — I25.10 CORONARY ARTERY DISEASE INVOLVING NATIVE CORONARY ARTERY OF NATIVE HEART WITHOUT ANGINA PECTORIS: ICD-10-CM

## 2024-10-28 LAB
ALT SERPL W P-5'-P-CCNC: 21 U/L (ref 0–70)
CHOLEST SERPL-MCNC: 182 MG/DL
FASTING STATUS PATIENT QL REPORTED: YES
HDLC SERPL-MCNC: 40 MG/DL
LDLC SERPL CALC-MCNC: 115 MG/DL
NONHDLC SERPL-MCNC: 142 MG/DL
TRIGL SERPL-MCNC: 134 MG/DL

## 2024-10-28 PROCEDURE — 84460 ALANINE AMINO (ALT) (SGPT): CPT | Performed by: INTERNAL MEDICINE

## 2024-10-28 PROCEDURE — 80061 LIPID PANEL: CPT | Performed by: INTERNAL MEDICINE

## 2024-10-28 PROCEDURE — 36415 COLL VENOUS BLD VENIPUNCTURE: CPT | Performed by: INTERNAL MEDICINE

## 2024-11-05 ENCOUNTER — LAB (OUTPATIENT)
Dept: LAB | Facility: CLINIC | Age: 79
End: 2024-11-05
Payer: COMMERCIAL

## 2024-11-05 ENCOUNTER — OFFICE VISIT (OUTPATIENT)
Dept: CARDIOLOGY | Facility: CLINIC | Age: 79
End: 2024-11-05
Payer: COMMERCIAL

## 2024-11-05 VITALS
OXYGEN SATURATION: 96 % | BODY MASS INDEX: 34.01 KG/M2 | SYSTOLIC BLOOD PRESSURE: 125 MMHG | HEIGHT: 74 IN | HEART RATE: 61 BPM | WEIGHT: 265 LBS | DIASTOLIC BLOOD PRESSURE: 80 MMHG

## 2024-11-05 DIAGNOSIS — I35.0 MODERATE AORTIC STENOSIS: ICD-10-CM

## 2024-11-05 DIAGNOSIS — I10 ESSENTIAL HYPERTENSION, BENIGN: ICD-10-CM

## 2024-11-05 DIAGNOSIS — R53.83 FATIGUE, UNSPECIFIED TYPE: ICD-10-CM

## 2024-11-05 DIAGNOSIS — E78.2 MIXED HYPERLIPIDEMIA: ICD-10-CM

## 2024-11-05 DIAGNOSIS — I25.10 CORONARY ARTERY DISEASE INVOLVING NATIVE CORONARY ARTERY OF NATIVE HEART WITHOUT ANGINA PECTORIS: Primary | ICD-10-CM

## 2024-11-05 DIAGNOSIS — I10 BENIGN ESSENTIAL HYPERTENSION: ICD-10-CM

## 2024-11-05 DIAGNOSIS — E11.9 TYPE 2 DIABETES MELLITUS WITHOUT COMPLICATION, WITHOUT LONG-TERM CURRENT USE OF INSULIN (H): ICD-10-CM

## 2024-11-05 DIAGNOSIS — I25.10 CORONARY ARTERY DISEASE INVOLVING NATIVE CORONARY ARTERY OF NATIVE HEART WITHOUT ANGINA PECTORIS: ICD-10-CM

## 2024-11-05 LAB — TSH SERPL DL<=0.005 MIU/L-ACNC: 2.04 UIU/ML (ref 0.3–4.2)

## 2024-11-05 PROCEDURE — G2211 COMPLEX E/M VISIT ADD ON: HCPCS | Performed by: INTERNAL MEDICINE

## 2024-11-05 PROCEDURE — 84443 ASSAY THYROID STIM HORMONE: CPT | Performed by: INTERNAL MEDICINE

## 2024-11-05 PROCEDURE — 99214 OFFICE O/P EST MOD 30 MIN: CPT | Performed by: INTERNAL MEDICINE

## 2024-11-05 PROCEDURE — 36415 COLL VENOUS BLD VENIPUNCTURE: CPT | Performed by: INTERNAL MEDICINE

## 2024-11-05 NOTE — LETTER
11/5/2024    Marce Loving MD  [address]  [address]  [facility] [id number]    RE: Travis Villa       Dear Colleague,     I had the pleasure of seeing Travis Villa in the [facility].  CARDIOLOGY CLINIC FOLLOW-UP NOTE      REASON FOR VISIT:   Follow-up aortic stenosis, CAD    PRIMARY CARE PHYSICIAN:  Marce Loving        History of Present Illness  Travis Villa is an extremely pleasant 79 year old male here for routine follow-up.  I also see his wife, Wendy (Mouna), in clinic.  His medical history is significant for moderate aortic stenosis, CAD s/p RCA and LCx PCI in 2008, severe statin intolerance and Zetia intolerance, ascending aortic dilatation, obesity, hypertension, dyslipidemia, DM2, colon adenoma s/p right hemicolectomy with umbilical hernia repair in 2/2023, prostate hypertrophy s/p TURP in 8/2023 with resultant urinary incontinence, and osteoarthritis.  He is a never smoker.    Since his last visit with me on 5/6/2024, he has had a few issues arise.  His BP in clinic was high on multiple rechecks, but he brings in a home BP log with average BP of ~ 125/80.  We had tried him on Zetia over the summer, but he did not tolerate this, so we stopped this and prescribed Repatha.  He picked this up, but has not yet started it.  In addition, he also self-discontinued his Jardiance out of concerns for side effects and the urinary frequency which can be very difficult with his incontinence since TURP.  His main complaint at present is of generalized fatigue.  No chest pains or other obvious anginal symptoms.    His most recent labs are from 10/28/2024, showing , HDL 40, , and .  His Hgb/Plt were normal in 8/2024, and his BMP was normal in 6/2024.   His most recent echo from 5/1/2024 showed normal biventricular size and function with moderate aortic stenosis (MG 29 mmHg, V-max 3.5 m/s, DI 0.38, BISMARK 1.5 cm ), and his ascending aorta measured 4.0 cm.  His most recent  ischemic evaluation was a Lexiscan MPI from 9/2020 showing a small area of nontransmural infarction in the mid to basal inferior segment with no surrounding ischemia.        Assessment & Plan    Generalized fatigue  Moderate aortic stenosis (MG 29 mmHg, V-max 3.5 m/s, DI 0.38, BISMARK 1.5 cm  on 5/2024 TTE), probably asymptomatic (though fatigue could be his first symptom)  CAD s/p RCA and LCx PCI in 2008, with CCS 0 angina at present  No ischemia on 9/2020 Lexiscan MPI  Severe statin intolerance (failed rosuvastatin, atorvastatin, and pravastatin)  Zetia intolerance  Ascending aortic dilatation (4.0 cm on 5/2024 TTE)  Hypertension, with severe whitecoat component, well controlled at home   Dyslipidemia, poorly controlled at present   NIDDM2  Colon adenoma s/p right hemicolectomy with umbilical hernia repair in 2/2023  Prostate hypertrophy s/p TURP in 8/2023, with resultant urinary incontinence  Osteoarthritis  Never smoker      It was a pleasure to speak with Lele again in clinic today.  We discussed several issues today, including potential causes of his fatigue, appropriate management of his CAD/lipid issues, and his aortic stenosis.  Briefly, there are many potential causes of his fatigue, including cardiac and non-cardiac etiologies.  Recommended checking TSH, repeat TTE (in case of progressive valve disease), and could consider sleep study +/- CPAP (though patient currently refuses this).  In the meantime, we will try cutting his carvedilol from 25 mg BID to 12.5 mg BID.  If his symptoms improve with this, we may consider further reduction in the future.  However, he should continue to log his BP, as we may need additional agents to counteract this drop in carvedilol.  Lastly, we discussed that I would like him to try using the Repatha, and I explained the reasons for doing so.  We will try to repeat his labs in around 6 months to reassess his fatigue and lipid control at that time.      --TSH  --Repeat  TTE  --Patient not interested in sleep study +/- CPAP  -Start Repatha (already prescribed and filled by patient, but not yet used)  -Continue aspirin 81 mg daily  --Wean carvedilol from 25 mg BID to 12.5 mg BID; may consider weaning further if fatigue improves with this  --Continue current hydrochlorothiazide 12.5 mg daily and ramipril 10 mg BID  -Continue home BP log; may need additional anti-HTN agent in the future  -Heart healthy diet, regular aerobic exercise      Follow-up: 6 months, with labs beforehand, or sooner PRN        Kit Cano MD  Interventional Cardiology  November 5, 2024      The longitudinal plan of care for the diagnosis(es)/condition(s) as documented were addressed during this visit. Due to the added complexity in care, I will continue to support Lele in the subsequent management and with ongoing continuity of care.          Medications  Current Outpatient Medications   Medication Sig Dispense Refill     albuterol (PROAIR HFA/PROVENTIL HFA/VENTOLIN HFA) 108 (90 Base) MCG/ACT inhaler Inhale 2 puffs into the lungs every 6 hours as needed for shortness of breath, wheezing or cough       Apple Cider Vinegar 600 MG CAPS Take by mouth 2 times daily.       aspirin 81 MG EC tablet Take 81 mg by mouth 2 times daily       Berberine Chloride (BERBERINE HCI PO) Take by mouth daily Helps regulate blood sugars       carvedilol (COREG) 25 MG tablet Take 1 tablet (25 mg) by mouth 2 times daily (with meals) 180 tablet 4     Cholecalciferol (VITAMIN D3 PO) Take 5,000 Units by mouth 4 times daily       CINNAMON PO Take 2,000 mg by mouth 2 times daily       Coenzyme Q10 (COQ-10) 200 MG CAPS Take by mouth daily       cyanocobalamin (VITAMIN B-12) 1000 MCG tablet Take 1,000 mcg by mouth 2 times daily.       diclofenac (VOLTAREN) 1 % topical gel Apply topically as needed for moderate pain       evolocumab (REPATHA) 140 MG/ML prefilled autoinjector Inject 1 mL (140 mg) Subcutaneous every 14 days 6 mL 3     fish  oil-omega-3 fatty acids 1000 MG capsule Take 1 capsule by mouth 2 times daily (with meals)       Flaxseed, Linseed, OIL Take 1 tablet by mouth daily.       folic acid 800 MCG tablet Take 800 mcg by mouth daily       Garlic 1000 MG CAPS Take by mouth daily.       GLUCOSAMINE SULFATE PO Take 1 tablet by mouth daily       hydrochlorothiazide (MICROZIDE) 12.5 MG capsule Take 2 capsules (25 mg) by mouth daily (Patient taking differently: Take 12.5 mg by mouth every other day. Takes 1 tablet 12.5mg daily) 90 capsule 11     magnesium oxide 400 MG CAPS Take by mouth daily       metFORMIN (GLUCOPHAGE) 500 MG tablet Take 500 mg by mouth 2 times daily (with meals)        multivitamin w/minerals (THERA-VIT-M) tablet Take 1 tablet by mouth daily       nitroGLYcerin (NITROSTAT) 0.4 MG sublingual tablet Place 1 tablet (0.4 mg) under the tongue every 5 minutes as needed for chest pain 25 tablet 3     oxyCODONE (ROXICODONE) 5 MG tablet Take 1 tablet (5 mg) by mouth every 6 hours as needed for moderate to severe pain 15 tablet 0     oxyCODONE-acetaminophen (PERCOCET) 5-325 MG tablet Take 1 tablet by mouth every 6 hours as needed for severe pain.       potassium gluconate 2.5 MEQ tablet Take by mouth daily       ramipril (ALTACE) 10 MG capsule Take 1 capsule (10 mg) by mouth 2 times daily 180 capsule 3     RESVERATROL PO Take by mouth daily       Saw Palmetto 500 MG CAPS Take by mouth 2 times daily.       tamsulosin (FLOMAX) 0.4 MG capsule Take 0.4 mg by mouth every evening.       traZODone (DESYREL) 50 MG tablet Take 1 tablet (50 mg) by mouth nightly as needed for sleep 10 tablet 0     vitamin B-Complex Take 1 tablet by mouth daily       vitamin C (ASCORBIC ACID) 1000 MG TABS Take 1,000 mg by mouth daily       vitamin E (TOCOPHEROL) 400 units (180 mg) capsule Take by mouth daily       empagliflozin (JARDIANCE) 10 MG TABS tablet Take 1 tablet (10 mg) by mouth daily (Patient not taking: Reported on 11/5/2024) 90 tablet 3     No current  facility-administered medications for this visit.     Allergies  Allergies   Allergen Reactions     Atorvastatin Other (See Comments)     Muscle aches     Seasonal Allergies          Physical Exam      BP: (!) 152/76 Pulse: 61     SpO2: 96 %      Vital Signs with Ranges  Pulse:  [61] 61  BP: (152)/(76) 152/76  SpO2:  [96 %] 96 %  265 lbs 0 oz    Constitutional: Well-appearing, no acute distress  Respiratory: Normal respiratory effort, CTAB  Cardiovascular: RRR, 3/6 mid peaking systolic murmur at the RUSB.  JVP difficult to assess.  There is 1+ bilateral LE edema.  Normal carotid upstrokes, no carotid bruits.      Thank you for allowing me to participate in the care of your patient.      Sincerely,     Kit Cano MD     [facility]  cc:   Marce Loving MD  [address] [address]

## 2024-11-05 NOTE — PROGRESS NOTES
CARDIOLOGY CLINIC FOLLOW-UP NOTE      REASON FOR VISIT:   Follow-up aortic stenosis, CAD    PRIMARY CARE PHYSICIAN:  Marce Loving        History of Present Illness   Travis Villa is an extremely pleasant 79 year old male here for routine follow-up.  I also see his wife, Wendy (Mouna), in clinic.  His medical history is significant for moderate aortic stenosis, CAD s/p RCA and LCx PCI in 2008, severe statin intolerance and Zetia intolerance, ascending aortic dilatation, obesity, hypertension, dyslipidemia, DM2, colon adenoma s/p right hemicolectomy with umbilical hernia repair in 2/2023, prostate hypertrophy s/p TURP in 8/2023 with resultant urinary incontinence, and osteoarthritis.  He is a never smoker.    Since his last visit with me on 5/6/2024, he has had a few issues arise.  His BP in clinic was high on multiple rechecks, but he brings in a home BP log with average BP of ~ 125/80.  We had tried him on Zetia over the summer, but he did not tolerate this, so we stopped this and prescribed Repatha.  He picked this up, but has not yet started it.  In addition, he also self-discontinued his Jardiance out of concerns for side effects and the urinary frequency which can be very difficult with his incontinence since TURP.  His main complaint at present is of generalized fatigue.  No chest pains or other obvious anginal symptoms.    His most recent labs are from 10/28/2024, showing , HDL 40, , and .  His Hgb/Plt were normal in 8/2024, and his BMP was normal in 6/2024.   His most recent echo from 5/1/2024 showed normal biventricular size and function with moderate aortic stenosis (MG 29 mmHg, V-max 3.5 m/s, DI 0.38, BISMARK 1.5 cm ), and his ascending aorta measured 4.0 cm.  His most recent ischemic evaluation was a Lexiscan MPI from 9/2020 showing a small area of nontransmural infarction in the mid to basal inferior segment with no surrounding ischemia.        Assessment & Plan     Generalized  fatigue  Moderate aortic stenosis (MG 29 mmHg, V-max 3.5 m/s, DI 0.38, BISMARK 1.5 cm  on 5/2024 TTE), probably asymptomatic (though fatigue could be his first symptom)  CAD s/p RCA and LCx PCI in 2008, with CCS 0 angina at present  No ischemia on 9/2020 Lexiscan MPI  Severe statin intolerance (failed rosuvastatin, atorvastatin, and pravastatin)  Zetia intolerance  Ascending aortic dilatation (4.0 cm on 5/2024 TTE)  Hypertension, with severe whitecoat component, well controlled at home   Dyslipidemia, poorly controlled at present   NIDDM2  Colon adenoma s/p right hemicolectomy with umbilical hernia repair in 2/2023  Prostate hypertrophy s/p TURP in 8/2023, with resultant urinary incontinence  Osteoarthritis  Never smoker      It was a pleasure to speak with Lele again in clinic today.  We discussed several issues today, including potential causes of his fatigue, appropriate management of his CAD/lipid issues, and his aortic stenosis.  Briefly, there are many potential causes of his fatigue, including cardiac and non-cardiac etiologies.  Recommended checking TSH, repeat TTE (in case of progressive valve disease), and could consider sleep study +/- CPAP (though patient currently refuses this).  In the meantime, we will try cutting his carvedilol from 25 mg BID to 12.5 mg BID.  If his symptoms improve with this, we may consider further reduction in the future.  However, he should continue to log his BP, as we may need additional agents to counteract this drop in carvedilol.  Lastly, we discussed that I would like him to try using the Repatha, and I explained the reasons for doing so.  We will try to repeat his labs in around 6 months to reassess his fatigue and lipid control at that time.      --TSH  --Repeat TTE  --Patient not interested in sleep study +/- CPAP  -Start Repatha (already prescribed and filled by patient, but not yet used)  -Continue aspirin 81 mg daily  --Wean carvedilol from 25 mg BID to 12.5 mg BID; may  consider weaning further if fatigue improves with this  --Continue current hydrochlorothiazide 12.5 mg daily and ramipril 10 mg BID  -Continue home BP log; may need additional anti-HTN agent in the future  -Heart healthy diet, regular aerobic exercise      Follow-up: 6 months, with labs beforehand, or sooner PRN        Kit Cano MD  Interventional Cardiology  November 5, 2024      The longitudinal plan of care for the diagnosis(es)/condition(s) as documented were addressed during this visit. Due to the added complexity in care, I will continue to support Lele in the subsequent management and with ongoing continuity of care.          Medications   Current Outpatient Medications   Medication Sig Dispense Refill    albuterol (PROAIR HFA/PROVENTIL HFA/VENTOLIN HFA) 108 (90 Base) MCG/ACT inhaler Inhale 2 puffs into the lungs every 6 hours as needed for shortness of breath, wheezing or cough      Apple Cider Vinegar 600 MG CAPS Take by mouth 2 times daily.      aspirin 81 MG EC tablet Take 81 mg by mouth 2 times daily      Berberine Chloride (BERBERINE HCI PO) Take by mouth daily Helps regulate blood sugars      carvedilol (COREG) 25 MG tablet Take 1 tablet (25 mg) by mouth 2 times daily (with meals) 180 tablet 4    Cholecalciferol (VITAMIN D3 PO) Take 5,000 Units by mouth 4 times daily      CINNAMON PO Take 2,000 mg by mouth 2 times daily      Coenzyme Q10 (COQ-10) 200 MG CAPS Take by mouth daily      cyanocobalamin (VITAMIN B-12) 1000 MCG tablet Take 1,000 mcg by mouth 2 times daily.      diclofenac (VOLTAREN) 1 % topical gel Apply topically as needed for moderate pain      evolocumab (REPATHA) 140 MG/ML prefilled autoinjector Inject 1 mL (140 mg) Subcutaneous every 14 days 6 mL 3    fish oil-omega-3 fatty acids 1000 MG capsule Take 1 capsule by mouth 2 times daily (with meals)      Flaxseed, Linseed, OIL Take 1 tablet by mouth daily.      folic acid 800 MCG tablet Take 800 mcg by mouth daily      Garlic 1000 MG  CAPS Take by mouth daily.      GLUCOSAMINE SULFATE PO Take 1 tablet by mouth daily      hydrochlorothiazide (MICROZIDE) 12.5 MG capsule Take 2 capsules (25 mg) by mouth daily (Patient taking differently: Take 12.5 mg by mouth every other day. Takes 1 tablet 12.5mg daily) 90 capsule 11    magnesium oxide 400 MG CAPS Take by mouth daily      metFORMIN (GLUCOPHAGE) 500 MG tablet Take 500 mg by mouth 2 times daily (with meals)       multivitamin w/minerals (THERA-VIT-M) tablet Take 1 tablet by mouth daily      nitroGLYcerin (NITROSTAT) 0.4 MG sublingual tablet Place 1 tablet (0.4 mg) under the tongue every 5 minutes as needed for chest pain 25 tablet 3    oxyCODONE (ROXICODONE) 5 MG tablet Take 1 tablet (5 mg) by mouth every 6 hours as needed for moderate to severe pain 15 tablet 0    oxyCODONE-acetaminophen (PERCOCET) 5-325 MG tablet Take 1 tablet by mouth every 6 hours as needed for severe pain.      potassium gluconate 2.5 MEQ tablet Take by mouth daily      ramipril (ALTACE) 10 MG capsule Take 1 capsule (10 mg) by mouth 2 times daily 180 capsule 3    RESVERATROL PO Take by mouth daily      Saw Palmetto 500 MG CAPS Take by mouth 2 times daily.      tamsulosin (FLOMAX) 0.4 MG capsule Take 0.4 mg by mouth every evening.      traZODone (DESYREL) 50 MG tablet Take 1 tablet (50 mg) by mouth nightly as needed for sleep 10 tablet 0    vitamin B-Complex Take 1 tablet by mouth daily      vitamin C (ASCORBIC ACID) 1000 MG TABS Take 1,000 mg by mouth daily      vitamin E (TOCOPHEROL) 400 units (180 mg) capsule Take by mouth daily      empagliflozin (JARDIANCE) 10 MG TABS tablet Take 1 tablet (10 mg) by mouth daily (Patient not taking: Reported on 11/5/2024) 90 tablet 3     No current facility-administered medications for this visit.     Allergies   Allergies   Allergen Reactions    Atorvastatin Other (See Comments)     Muscle aches    Seasonal Allergies          Physical Exam       BP: (!) 152/76 Pulse: 61     SpO2: 96 %       Vital Signs with Ranges  Pulse:  [61] 61  BP: (152)/(76) 152/76  SpO2:  [96 %] 96 %  265 lbs 0 oz    Constitutional: Well-appearing, no acute distress  Respiratory: Normal respiratory effort, CTAB  Cardiovascular: RRR, 3/6 mid peaking systolic murmur at the RUSB.  JVP difficult to assess.  There is 1+ bilateral LE edema.  Normal carotid upstrokes, no carotid bruits.

## 2024-11-20 ENCOUNTER — HOSPITAL ENCOUNTER (OUTPATIENT)
Dept: CARDIOLOGY | Facility: CLINIC | Age: 79
Discharge: HOME | End: 2024-11-20
Attending: INTERNAL MEDICINE
Payer: COMMERCIAL

## 2024-11-20 DIAGNOSIS — E11.9 TYPE 2 DIABETES MELLITUS WITHOUT COMPLICATION, WITHOUT LONG-TERM CURRENT USE OF INSULIN (H): ICD-10-CM

## 2024-11-20 DIAGNOSIS — I35.0 MODERATE AORTIC STENOSIS: ICD-10-CM

## 2024-11-20 DIAGNOSIS — E78.2 MIXED HYPERLIPIDEMIA: ICD-10-CM

## 2024-11-20 DIAGNOSIS — I25.10 CORONARY ARTERY DISEASE INVOLVING NATIVE CORONARY ARTERY OF NATIVE HEART WITHOUT ANGINA PECTORIS: ICD-10-CM

## 2024-11-20 DIAGNOSIS — I10 ESSENTIAL HYPERTENSION, BENIGN: ICD-10-CM

## 2024-11-20 DIAGNOSIS — I10 BENIGN ESSENTIAL HYPERTENSION: ICD-10-CM

## 2024-11-20 LAB — LVEF ECHO: NORMAL

## 2024-11-20 PROCEDURE — 93325 DOPPLER ECHO COLOR FLOW MAPG: CPT

## 2024-11-20 PROCEDURE — 255N000002 HC RX 255 OP 636: Performed by: INTERNAL MEDICINE

## 2024-11-20 RX ADMIN — HUMAN ALBUMIN MICROSPHERES AND PERFLUTREN 9 ML: 10; .22 INJECTION, SOLUTION INTRAVENOUS at 13:35

## 2024-12-21 ENCOUNTER — HEALTH MAINTENANCE LETTER (OUTPATIENT)
Age: 79
End: 2024-12-21

## 2024-12-31 DIAGNOSIS — I35.0 NONRHEUMATIC AORTIC VALVE STENOSIS: ICD-10-CM

## 2024-12-31 DIAGNOSIS — I10 BENIGN ESSENTIAL HYPERTENSION: ICD-10-CM

## 2024-12-31 DIAGNOSIS — I10 ESSENTIAL HYPERTENSION, BENIGN: ICD-10-CM

## 2024-12-31 DIAGNOSIS — I25.10 CORONARY ARTERY DISEASE INVOLVING NATIVE CORONARY ARTERY OF NATIVE HEART WITHOUT ANGINA PECTORIS: ICD-10-CM

## 2024-12-31 DIAGNOSIS — R00.1 BRADYCARDIA: ICD-10-CM

## 2024-12-31 RX ORDER — HYDROCHLOROTHIAZIDE 12.5 MG/1
25 CAPSULE ORAL DAILY
Qty: 180 CAPSULE | Refills: 3 | Status: SHIPPED | OUTPATIENT
Start: 2024-12-31

## 2025-01-10 ENCOUNTER — TRANSFERRED RECORDS (OUTPATIENT)
Dept: MULTI SPECIALTY CLINIC | Facility: CLINIC | Age: 80
End: 2025-01-10

## 2025-01-10 LAB — RETINOPATHY: NORMAL

## 2025-01-28 ENCOUNTER — TELEPHONE (OUTPATIENT)
Dept: CARDIOLOGY | Facility: CLINIC | Age: 80
End: 2025-01-28

## 2025-01-28 DIAGNOSIS — I10 ESSENTIAL HYPERTENSION, BENIGN: ICD-10-CM

## 2025-01-28 DIAGNOSIS — R07.9 CHEST PAIN, UNSPECIFIED TYPE: ICD-10-CM

## 2025-01-28 DIAGNOSIS — I25.10 CORONARY ARTERY DISEASE INVOLVING NATIVE CORONARY ARTERY OF NATIVE HEART WITHOUT ANGINA PECTORIS: ICD-10-CM

## 2025-01-28 RX ORDER — RAMIPRIL 10 MG/1
10 CAPSULE ORAL 2 TIMES DAILY
Qty: 180 CAPSULE | Refills: 3 | Status: SHIPPED | OUTPATIENT
Start: 2025-01-28

## 2025-01-28 RX ORDER — CARVEDILOL 25 MG/1
25 TABLET ORAL 2 TIMES DAILY WITH MEALS
Qty: 180 TABLET | Refills: 3 | Status: SHIPPED | OUTPATIENT
Start: 2025-01-28

## 2025-01-28 NOTE — TELEPHONE ENCOUNTER
[facility] Cardiology Refill Guideline reviewed.  Medication does not meet criteria for refill due to need dose clarification, 25 BID or 12.5 BID. Please review last progress note 11/5/24.  Messaged to providers team for further review.

## 2025-01-28 NOTE — TELEPHONE ENCOUNTER
Called pt, no answer. Left  requesting call back to Team 4 at [phone] to clarify what dose of carvedilol he is taking.     Addendum 1/28/25 - Spoke with pt, he reported he tried decreasing carvedilol to 12.5 mg BID as recommended at his last visit with Dr. Cano but he did not notice a change in his fatigue and his BP went up so he resumed 25 mg BID. Pt would like prescriptions for both carvedilol and ramipril 10 mg daily sent to pharmacy so he can pick them up together. Advised prescription for ramipril was sent already today, will send in prescription for carvedilol. Will update Dr. Cano.

## 2025-04-07 ENCOUNTER — APPOINTMENT (OUTPATIENT)
Dept: GENERAL RADIOLOGY | Facility: CLINIC | Age: 80
End: 2025-04-07
Attending: EMERGENCY MEDICINE
Payer: COMMERCIAL

## 2025-04-07 ENCOUNTER — HOSPITAL ENCOUNTER (EMERGENCY)
Facility: CLINIC | Age: 80
Discharge: HOME | End: 2025-04-07
Attending: EMERGENCY MEDICINE | Admitting: EMERGENCY MEDICINE
Payer: COMMERCIAL

## 2025-04-07 VITALS
DIASTOLIC BLOOD PRESSURE: 68 MMHG | SYSTOLIC BLOOD PRESSURE: 152 MMHG | RESPIRATION RATE: 17 BRPM | HEART RATE: 67 BPM | TEMPERATURE: 97.5 F | OXYGEN SATURATION: 94 %

## 2025-04-07 DIAGNOSIS — M79.622 PAIN OF LEFT UPPER ARM: ICD-10-CM

## 2025-04-07 LAB
ANION GAP SERPL CALCULATED.3IONS-SCNC: 9 MMOL/L (ref 7–15)
ATRIAL RATE - MUSE: 66 BPM
BASOPHILS # BLD AUTO: 0 10E3/UL (ref 0–0.2)
BASOPHILS NFR BLD AUTO: 0 %
BUN SERPL-MCNC: 11.9 MG/DL (ref 8–23)
CALCIUM SERPL-MCNC: 9 MG/DL (ref 8.8–10.4)
CHLORIDE SERPL-SCNC: 98 MMOL/L (ref 98–107)
CREAT SERPL-MCNC: 0.51 MG/DL (ref 0.67–1.17)
DIASTOLIC BLOOD PRESSURE - MUSE: NORMAL MMHG
EGFRCR SERPLBLD CKD-EPI 2021: >90 ML/MIN/1.73M2
EOSINOPHIL # BLD AUTO: 0.2 10E3/UL (ref 0–0.7)
EOSINOPHIL NFR BLD AUTO: 3 %
ERYTHROCYTE [DISTWIDTH] IN BLOOD BY AUTOMATED COUNT: 14.2 % (ref 10–15)
GLUCOSE SERPL-MCNC: 155 MG/DL (ref 70–99)
HCO3 SERPL-SCNC: 30 MMOL/L (ref 22–29)
HCT VFR BLD AUTO: 44.5 % (ref 40–53)
HGB BLD-MCNC: 14.4 G/DL (ref 13.3–17.7)
HOLD SPECIMEN: NORMAL
IMM GRANULOCYTES # BLD: 0 10E3/UL
IMM GRANULOCYTES NFR BLD: 0 %
INTERPRETATION ECG - MUSE: NORMAL
LYMPHOCYTES # BLD AUTO: 2.4 10E3/UL (ref 0.8–5.3)
LYMPHOCYTES NFR BLD AUTO: 33 %
MCH RBC QN AUTO: 27.3 PG (ref 26.5–33)
MCHC RBC AUTO-ENTMCNC: 32.4 G/DL (ref 31.5–36.5)
MCV RBC AUTO: 84 FL (ref 78–100)
MONOCYTES # BLD AUTO: 0.6 10E3/UL (ref 0–1.3)
MONOCYTES NFR BLD AUTO: 8 %
NEUTROPHILS # BLD AUTO: 4.1 10E3/UL (ref 1.6–8.3)
NEUTROPHILS NFR BLD AUTO: 56 %
NRBC # BLD AUTO: 0 10E3/UL
NRBC BLD AUTO-RTO: 0 /100
P AXIS - MUSE: 73 DEGREES
PLATELET # BLD AUTO: 206 10E3/UL (ref 150–450)
POTASSIUM SERPL-SCNC: 4 MMOL/L (ref 3.4–5.3)
PR INTERVAL - MUSE: 192 MS
QRS DURATION - MUSE: 96 MS
QT - MUSE: 402 MS
QTC - MUSE: 421 MS
R AXIS - MUSE: 38 DEGREES
RBC # BLD AUTO: 5.28 10E6/UL (ref 4.4–5.9)
SODIUM SERPL-SCNC: 137 MMOL/L (ref 135–145)
SYSTOLIC BLOOD PRESSURE - MUSE: NORMAL MMHG
T AXIS - MUSE: 47 DEGREES
TROPONIN T SERPL HS-MCNC: 11 NG/L
TROPONIN T SERPL HS-MCNC: 12 NG/L
VENTRICULAR RATE- MUSE: 66 BPM
WBC # BLD AUTO: 7.3 10E3/UL (ref 4–11)

## 2025-04-07 PROCEDURE — 36415 COLL VENOUS BLD VENIPUNCTURE: CPT | Performed by: EMERGENCY MEDICINE

## 2025-04-07 PROCEDURE — 84484 ASSAY OF TROPONIN QUANT: CPT | Performed by: EMERGENCY MEDICINE

## 2025-04-07 PROCEDURE — 85018 HEMOGLOBIN: CPT | Performed by: EMERGENCY MEDICINE

## 2025-04-07 PROCEDURE — 85004 AUTOMATED DIFF WBC COUNT: CPT | Performed by: EMERGENCY MEDICINE

## 2025-04-07 PROCEDURE — 71046 X-RAY EXAM CHEST 2 VIEWS: CPT

## 2025-04-07 PROCEDURE — 93005 ELECTROCARDIOGRAM TRACING: CPT

## 2025-04-07 PROCEDURE — 99285 EMERGENCY DEPT VISIT HI MDM: CPT | Mod: 25

## 2025-04-07 PROCEDURE — 80048 BASIC METABOLIC PNL TOTAL CA: CPT | Performed by: EMERGENCY MEDICINE

## 2025-04-07 ASSESSMENT — ACTIVITIES OF DAILY LIVING (ADL)
ADLS_ACUITY_SCORE: 54

## 2025-04-07 NOTE — ED TRIAGE NOTES
Severe pain in left arm woke him up this morning around 30 min PTA, does have some shoulder problems, this felt different from normal pain. Hx of stents. Pain is currently resolved.

## 2025-04-07 NOTE — ED PROVIDER NOTES
Emergency Department Note      History of Present Illness     Chief Complaint   Arm Pain      HPI   Travis Villa is a 79 year old male with a history of myocardial infarction presenting to the ED for evaluation of shoulder pain. The patient reports that he woke up this morning with significant pain throughout the entire left arm. This pain lasted about 30 seconds. He has bilateral arm pain and shoulder pain at baseline for the past year but this felt different than his typical pain.  No chest pain, abdominal pain, new shortness of breath, arm swelling, or new changes in his lower extremity edema. No history of tobacco use. He has had three cardiac stents placed.    Independent Historian   None    Review of External Notes   None    Past Medical History     Medical History and Problem List   Adenomatous colon polyp  Aortic stenosis  Hypertension   BPH (benign prostatic hyperplasia)  Chronic narcotic use  Coronary artery disease   DDD (degenerative disc disease), cervical and lumbar  Diabetes mellitus   Elevated prostate specific antigen (PSA)  GERD (gastroesophageal reflux disease)  Mixed hyperlipidemia  Myocardial infarction   Osteoarthritis  Squamous cell carcinoma in situ of skin of face  Synovial cyst of right popliteal space  Umbilical hernia with obstruction  Vitamin D deficiency    Medications   Aspirin 81 mg  Berberine   Coreg  CoQ-10  Folic acid  Hydrochlorothiazide  Magnesium oxide  Glucophage  Altace  Resveratrol   Desyrel     Surgical History   Heart catheterization angioplasty   Umbilical herniorrhaphy  Colectomy     Physical Exam     Patient Vitals for the past 24 hrs:   BP Temp Temp src Pulse Resp SpO2   04/07/25 1000 (!) 152/68 -- -- 67 17 --   04/07/25 0958 -- -- -- -- -- 94 %   04/07/25 0930 (!) 144/77 -- -- 70 21 94 %   04/07/25 0800 -- -- -- 67 16 --   04/07/25 0721 (!) 176/86 -- -- 69 18 93 %   04/07/25 0711 (!) 215/91 -- -- -- -- --   04/07/25 0709 -- 97.5  F (36.4  C) Temporal 70 20 93 %      Physical Exam  General: Alert and cooperative with exam. Patient in mild  distress. Normal mentation.  Head:  Scalp is NC/AT  Eyes:  No scleral icterus, PERRL  ENT:  The external nose and ears are normal. The oropharynx is normal and without erythema; mucus membranes are moist. Uvula midline, no evidence of deep space infection.  Neck:  Normal range of motion without rigidity.  CV:  Regular rate and rhythm    No pathologic murmur   Resp:  Breath sounds are clear bilaterally    Non-labored, no retractions or accessory muscle use  GI:  Abdomen is soft, no distension, no tenderness. No peritoneal signs  MS:  Mild muscle atrophy (chronic) to left upper extremity. No swelling or significant pain with active/passive ROM. CMS intact. +1 pitting edema to the bilateral lower extremities (chronic per patient).   Skin:  Warm and dry, No rash or lesions noted.  Neuro: Oriented x 3. No gross motor deficits.       Diagnostics     Lab Results   Labs Ordered and Resulted from Time of ED Arrival to Time of ED Departure   BASIC METABOLIC PANEL - Abnormal       Result Value    Sodium 137      Potassium 4.0      Chloride 98      Carbon Dioxide (CO2) 30 (*)     Anion Gap 9      Urea Nitrogen 11.9      Creatinine 0.51 (*)     GFR Estimate >90      Calcium 9.0      Glucose 155 (*)    TROPONIN T, HIGH SENSITIVITY - Normal    Troponin T, High Sensitivity 12     TROPONIN T, HIGH SENSITIVITY - Normal    Troponin T, High Sensitivity 11     CBC WITH PLATELETS AND DIFFERENTIAL    WBC Count 7.3      RBC Count 5.28      Hemoglobin 14.4      Hematocrit 44.5      MCV 84      MCH 27.3      MCHC 32.4      RDW 14.2      Platelet Count 206      % Neutrophils 56      % Lymphocytes 33      % Monocytes 8      % Eosinophils 3      % Basophils 0      % Immature Granulocytes 0      NRBCs per 100 WBC 0      Absolute Neutrophils 4.1      Absolute Lymphocytes 2.4      Absolute Monocytes 0.6      Absolute Eosinophils 0.2      Absolute Basophils 0.0       Absolute Immature Granulocytes 0.0      Absolute NRBCs 0.0       Imaging   Chest XR,  PA & LAT   Final Result   IMPRESSION: Cardiac silhouette is within normal limits. Aortic arch calcification. No focal airspace consolidation. No pleural effusion or pneumothorax. Advanced degenerative changes of the bilateral shoulders. Multilevel degenerative changes of the    spine.      Report per radiology.     EKG   ECG results from 04/07/25   EKG 12-lead, tracing only     Value    Systolic Blood Pressure     Diastolic Blood Pressure     Ventricular Rate 66    Atrial Rate 66    LA Interval 192    QRS Duration 96        QTc 421    P Axis 73    R AXIS 38    T Axis 47    Interpretation ECG      Sinus rhythm  Normal ECG  When compared with ECG of 04-Aug-2023 13:42,  Premature supraventricular complexes are no longer Present  Incomplete right bundle branch block is no longer Present  Read by Me at 0720          Independent Interpretation   CXR: No pneumothorax, infiltrate, or pleural effusion.    ED Course      Medications Administered   Medications - No data to display    Procedures   Procedures     Discussion of Management   None    ED Course   ED Course as of 04/07/25 2139   Mon Apr 07, 2025   0720 I obtained history and examined the patient as noted above.      Additional Documentation  None    Medical Decision Making / Diagnosis     CMS Diagnoses: None    MIPS       None    [facility]   Travis Villa is a 79 year old male with history of chronic shoulder pain who presents with brief episode of now resolved left arm pain.  Patient's medical history and records reviewed.  There is no evidence of cardiac or pulmonary etiology.  Neurologic exam is normal.  No significant findings on physical exam.  Chest x-ray and lab work are without significant acute findings including normal troponin x 2.  EKG without evidence of acute ischemia, infarction, or significant arrhythmia.  No emergent etiology for patient's brief episode was  determined.  Supportive care and return precautions were discussed.  Follow-up close with PCP as needed.  Patient discharged home.    Disposition   The patient was discharged.     Diagnosis     ICD-10-CM    1. Pain of left upper arm  M79.622          Scribe Disclosure:  ISandra, am serving as a scribe at 7:36 AM on 4/7/2025 to document services personally performed by Franklin Mark DO based on my observations and the provider's statements to me.        Franklin Mark DO  04/07/25 2143

## 2025-04-15 ENCOUNTER — OFFICE VISIT (OUTPATIENT)
Dept: INTERNAL MEDICINE | Facility: CLINIC | Age: 80
End: 2025-04-15
Payer: COMMERCIAL

## 2025-04-15 ENCOUNTER — OFFICE VISIT (OUTPATIENT)
Dept: CARDIOLOGY | Facility: CLINIC | Age: 80
End: 2025-04-15
Payer: COMMERCIAL

## 2025-04-15 VITALS
WEIGHT: 280 LBS | HEART RATE: 70 BPM | BODY MASS INDEX: 35.94 KG/M2 | SYSTOLIC BLOOD PRESSURE: 144 MMHG | DIASTOLIC BLOOD PRESSURE: 80 MMHG | HEIGHT: 74 IN

## 2025-04-15 VITALS
SYSTOLIC BLOOD PRESSURE: 145 MMHG | WEIGHT: 280 LBS | TEMPERATURE: 97.8 F | OXYGEN SATURATION: 94 % | DIASTOLIC BLOOD PRESSURE: 84 MMHG | HEART RATE: 71 BPM | BODY MASS INDEX: 35.94 KG/M2 | HEIGHT: 74 IN

## 2025-04-15 DIAGNOSIS — I25.10 CORONARY ARTERY DISEASE INVOLVING NATIVE CORONARY ARTERY OF NATIVE HEART WITHOUT ANGINA PECTORIS: ICD-10-CM

## 2025-04-15 DIAGNOSIS — E78.2 MIXED HYPERLIPIDEMIA: ICD-10-CM

## 2025-04-15 DIAGNOSIS — N40.1 BPH WITH LOWER URINARY TRACT SYMPTOMS WITHOUT URINARY OBSTRUCTION: ICD-10-CM

## 2025-04-15 DIAGNOSIS — I35.0 NONRHEUMATIC AORTIC VALVE STENOSIS: ICD-10-CM

## 2025-04-15 DIAGNOSIS — E11.9 TYPE 2 DIABETES MELLITUS WITHOUT COMPLICATION, WITHOUT LONG-TERM CURRENT USE OF INSULIN (H): ICD-10-CM

## 2025-04-15 DIAGNOSIS — E66.01 SEVERE OBESITY (BMI 35.0-39.9) WITH COMORBIDITY (H): ICD-10-CM

## 2025-04-15 DIAGNOSIS — E11.59 TYPE 2 DIABETES MELLITUS WITH OTHER CIRCULATORY COMPLICATION, WITHOUT LONG-TERM CURRENT USE OF INSULIN (H): Primary | ICD-10-CM

## 2025-04-15 DIAGNOSIS — I10 BENIGN ESSENTIAL HYPERTENSION: ICD-10-CM

## 2025-04-15 PROBLEM — J44.9 CHRONIC OBSTRUCTIVE PULMONARY DISEASE, UNSPECIFIED COPD TYPE (H): Status: ACTIVE | Noted: 2025-04-15

## 2025-04-15 PROBLEM — Z78.9 STATIN INTOLERANCE: Status: ACTIVE | Noted: 2019-02-25

## 2025-04-15 PROBLEM — M51.9 LUMBAR DISC DISEASE: Status: ACTIVE | Noted: 2018-06-07

## 2025-04-15 PROBLEM — M51.35 DDD (DEGENERATIVE DISC DISEASE), THORACOLUMBAR: Status: ACTIVE | Noted: 2024-08-07

## 2025-04-15 PROBLEM — D04.62: Status: ACTIVE | Noted: 2018-06-07

## 2025-04-15 PROBLEM — M50.90 CERVICAL DISC DISEASE: Status: ACTIVE | Noted: 2018-06-07

## 2025-04-15 PROBLEM — E78.5 DYSLIPIDEMIA: Status: ACTIVE | Noted: 2018-06-07

## 2025-04-15 PROBLEM — M25.549 PAIN IN JOINT, HAND: Status: ACTIVE | Noted: 2018-06-07

## 2025-04-15 PROBLEM — Z90.49 STATUS POST RIGHT HEMICOLECTOMY: Status: ACTIVE | Noted: 2024-08-07

## 2025-04-15 PROBLEM — I51.9 CARDIAC COMPLICATION: Status: ACTIVE | Noted: 2021-09-07

## 2025-04-15 PROBLEM — M19.90 OSTEOARTHROSIS: Status: ACTIVE | Noted: 2018-06-07

## 2025-04-15 PROBLEM — J44.9 CHRONIC OBSTRUCTIVE PULMONARY DISEASE, UNSPECIFIED COPD TYPE (H): Status: RESOLVED | Noted: 2025-04-15 | Resolved: 2025-04-15

## 2025-04-15 PROBLEM — E29.1 TESTICULAR HYPOFUNCTION: Status: ACTIVE | Noted: 2018-06-07

## 2025-04-15 PROBLEM — K42.9 UMBILICAL HERNIA: Status: ACTIVE | Noted: 2018-06-07

## 2025-04-15 PROBLEM — F11.90 CHRONIC NARCOTIC USE: Status: ACTIVE | Noted: 2018-06-07

## 2025-04-15 LAB
CREAT UR-MCNC: 44.6 MG/DL
EST. AVERAGE GLUCOSE BLD GHB EST-MCNC: 169 MG/DL
HBA1C MFR BLD: 7.5 % (ref 0–5.6)
MICROALBUMIN UR-MCNC: 49.7 MG/L
MICROALBUMIN/CREAT UR: 111.43 MG/G CR (ref 0–17)

## 2025-04-15 PROCEDURE — 3079F DIAST BP 80-89 MM HG: CPT | Performed by: INTERNAL MEDICINE

## 2025-04-15 PROCEDURE — 82570 ASSAY OF URINE CREATININE: CPT | Performed by: INTERNAL MEDICINE

## 2025-04-15 PROCEDURE — 83036 HEMOGLOBIN GLYCOSYLATED A1C: CPT | Performed by: INTERNAL MEDICINE

## 2025-04-15 PROCEDURE — 1125F AMNT PAIN NOTED PAIN PRSNT: CPT | Performed by: INTERNAL MEDICINE

## 2025-04-15 PROCEDURE — G2211 COMPLEX E/M VISIT ADD ON: HCPCS | Performed by: INTERNAL MEDICINE

## 2025-04-15 PROCEDURE — 3077F SYST BP >= 140 MM HG: CPT | Performed by: INTERNAL MEDICINE

## 2025-04-15 PROCEDURE — 36415 COLL VENOUS BLD VENIPUNCTURE: CPT | Performed by: INTERNAL MEDICINE

## 2025-04-15 PROCEDURE — 99204 OFFICE O/P NEW MOD 45 MIN: CPT | Performed by: INTERNAL MEDICINE

## 2025-04-15 PROCEDURE — 82043 UR ALBUMIN QUANTITATIVE: CPT | Performed by: INTERNAL MEDICINE

## 2025-04-15 ASSESSMENT — PAIN SCALES - GENERAL: PAINLEVEL_OUTOF10: SEVERE PAIN (8)

## 2025-04-15 NOTE — LETTER
4/15/2025    Luis E Faye MD  [address] [facility] [address]    RE: Travis Villa       Dear Colleague,     I had the pleasure of seeing Travis Villa in the [facility].  HISTORY OF PRESENT ILLNESS:  Travis Villa a 79 year old male with coronary artery disease, aortic stenosis, hypertension, type 2 diabetes mellitus, obesity, prostatic hypertrophy, chronic low back pain, and statin intolerance, was seen today at your request for follow-up.     Seen in ED. Could not get into see  .  Dyspnea with exertion. Admits he is sedentary injury to right  groin difficult  to be active BP at home  130/70. Plans  to  begin evolocumab and empagliflozin. Saw PMD this AM. Will start on nutrisystem.      Generalized fatigue  Moderate aortic stenosis (MG 29 mmHg, V-max 3.5 m/s, DI 0.38, BISMARK 1.5 cm  on 5/2024 TTE), probably asymptomatic (though fatigue could be his first symptom)  CAD s/p RCA and LCx PCI in 2008, with CCS 0 angina at present  No ischemia on 9/2020 Lexiscan MPI  Severe statin intolerance (failed rosuvastatin, atorvastatin, and pravastatin)  Zetia intolerance  Ascending aortic dilatation (4.0 cm on 5/2024 TTE)  Essential Hypertension   Dyslipidemia, poorly controlled at present   NIDDM2  Colon adenoma s/p right hemicolectomy with umbilical hernia repair in 2/2023  Prostate hypertrophy s/p TURP in 8/2023, with resultant urinary incontinence  Osteoarthritis  Never smoker    Orders this Visit:  No orders of the defined types were placed in this encounter.    No orders of the defined types were placed in this encounter.    There are no discontinued medications.    No diagnosis found.    CURRENT MEDICATIONS:  Current Outpatient Medications   Medication Sig Dispense Refill     albuterol (PROAIR HFA/PROVENTIL HFA/VENTOLIN HFA) 108 (90 Base) MCG/ACT inhaler Inhale 2 puffs into the lungs every 6 hours as needed for shortness of breath, wheezing or cough       aspirin 81 MG EC  tablet Take 81 mg by mouth 2 times daily       Berberine Chloride (BERBERINE HCI PO) Take by mouth daily Helps regulate blood sugars       carvedilol (COREG) 25 MG tablet Take 1 tablet (25 mg) by mouth 2 times daily (with meals). 180 tablet 3     Cholecalciferol (VITAMIN D3 PO) Take 5,000 Units by mouth 4 times daily       CINNAMON PO Take 2,000 mg by mouth 2 times daily       Coenzyme Q10 (COQ-10) 200 MG CAPS Take by mouth daily       diclofenac (VOLTAREN) 1 % topical gel Apply topically as needed for moderate pain       empagliflozin (JARDIANCE) 10 MG TABS tablet Take 1 tablet (10 mg) by mouth daily. 90 tablet 0     evolocumab (REPATHA) 140 MG/ML prefilled autoinjector Inject 1 mL (140 mg) Subcutaneous every 14 days 6 mL 3     fish oil-omega-3 fatty acids 1000 MG capsule Take 1 capsule by mouth 2 times daily (with meals)       folic acid 800 MCG tablet Take 800 mcg by mouth daily       GLUCOSAMINE SULFATE PO Take 1 tablet by mouth daily       hydrochlorothiazide (MICROZIDE) 12.5 MG capsule Take 2 capsules (25 mg) by mouth daily. 180 capsule 3     magnesium oxide 400 MG CAPS Take by mouth daily       metFORMIN (GLUCOPHAGE) 500 MG tablet Take 500 mg by mouth 2 times daily (with meals)        multivitamin w/minerals (THERA-VIT-M) tablet Take 1 tablet by mouth daily       potassium gluconate 2.5 MEQ tablet Take by mouth daily       ramipril (ALTACE) 10 MG capsule Take 1 capsule (10 mg) by mouth 2 times daily. 180 capsule 3     RESVERATROL PO Take by mouth daily       tamsulosin (FLOMAX) 0.4 MG capsule Take 0.4 mg by mouth every evening.       vitamin B-Complex Take 1 tablet by mouth daily       vitamin C (ASCORBIC ACID) 1000 MG TABS Take 1,000 mg by mouth daily       Apple Cider Vinegar 600 MG CAPS Take by mouth 2 times daily. (Patient not taking: Reported on 4/15/2025)       cyanocobalamin (VITAMIN B-12) 1000 MCG tablet Take 1,000 mcg by mouth 2 times daily. (Patient not taking: Reported on 4/15/2025)       Flaxseed,  "Linseed, OIL Take 1 tablet by mouth daily. (Patient not taking: Reported on 4/15/2025)       Garlic 1000 MG CAPS Take by mouth daily. (Patient not taking: Reported on 4/15/2025)       nitroGLYcerin (NITROSTAT) 0.4 MG sublingual tablet Place 1 tablet (0.4 mg) under the tongue every 5 minutes as needed for chest pain (Patient not taking: Reported on 4/15/2025) 25 tablet 3     Saw Palmetto 500 MG CAPS Take by mouth 2 times daily. (Patient not taking: Reported on 4/15/2025)       traZODone (DESYREL) 50 MG tablet Take 1 tablet (50 mg) by mouth nightly as needed for sleep (Patient not taking: Reported on 4/15/2025) 10 tablet 0     vitamin E (TOCOPHEROL) 400 units (180 mg) capsule Take by mouth daily (Patient not taking: Reported on 4/15/2025)         ALLERGIES     Allergies   Allergen Reactions     Atorvastatin Other (See Comments)     Muscle aches     Seasonal Allergies        PAST MEDICAL, SURGICAL, FAMILY, SOCIAL HISTORY:  History was reviewed and updated as needed, see medical record.        Review of Systems:  A 12-point review of systems was completed, see medical record for detailed review of systems information.    Physical Exam:  Vitals: Ht 1.88 m (6' 2\")   Wt 127 kg (280 lb)   BMI 35.95 kg/m      Constitutional: No apparent distress    HEENT: pupils equal round reactive to light, thyroid normal size, JVP is normal    Chest: Clear to percussion and auscultation    Cardiac: Normal S1, normal S2 no S3, no murmur or click    Abdomen: Scaphoid.  Liver percusses to 6 cm spleen is not palpable aorta is not tender or enlarged    Extremitiies: no edema.    Neurological:  Cranial nerves II through XII are intact, strength equal and symmetrical, displays normal insight and judgment        ASSESSMENT: Travis Villa is a 79 year old male with          We reviewed the benefits of a regular exercise program, a Mediterranean-style weight loss diet, and contacting us for any changes in between now and the time of her next " visit.     RECOMMENDATIONS:   1) TTE to reassess aortic valve  2) follow up with CYDNEY 5/2025 as previously scheduled   3)  Agree with Mediterranean style diet, resuming evolocumab, empagliflozin         Recent Lab Results:  LIPID RESULTS:  Lab Results   Component Value Date    CHOL 182 10/28/2024    CHOL 190 12/06/2017    HDL 40 10/28/2024    HDL 32 12/06/2017     (H) 10/28/2024     12/06/2017    TRIG 134 10/28/2024    TRIG 229 12/06/2017    CHOLHDLRATIO 3.9 03/13/2014       LIVER ENZYME RESULTS:  Lab Results   Component Value Date    AST 23 07/31/2023    AST 33 12/29/2012    ALT 21 10/28/2024    ALT 6 03/13/2014       CBC RESULTS:  Lab Results   Component Value Date    WBC 7.3 04/07/2025    WBC 7.4 02/27/2019    RBC 5.28 04/07/2025    RBC 5.08 02/27/2019    HGB 14.4 04/07/2025    HGB 14.3 02/27/2019    HCT 44.5 04/07/2025    HCT 42.3 02/27/2019    MCV 84 04/07/2025    MCV 83 02/27/2019    MCH 27.3 04/07/2025    MCH 28.1 02/27/2019    MCHC 32.4 04/07/2025    MCHC 33.8 02/27/2019    RDW 14.2 04/07/2025    RDW 14.3 02/27/2019     04/07/2025     02/27/2019       BMP RESULTS:  Lab Results   Component Value Date     04/07/2025     02/27/2019    POTASSIUM 4.0 04/07/2025    POTASSIUM 4.0 02/27/2019    CHLORIDE 98 04/07/2025    CHLORIDE 96 02/27/2019    CO2 30 (H) 04/07/2025    CO2 30 02/27/2019    ANIONGAP 9 04/07/2025    ANIONGAP 8 02/27/2019     (H) 04/07/2025     (H) 02/19/2023     (H) 02/27/2019    BUN 11.9 04/07/2025    BUN 14 02/27/2019    CR 0.51 (L) 04/07/2025    CR 0.46 (L) 02/27/2019    GFRESTIMATED >90 04/07/2025    GFRESTIMATED >60 01/24/2023    GFRESTIMATED >90 02/27/2019    GFRESTBLACK >90 02/27/2019    LEIGH ANN 9.0 04/07/2025    LEIGH ANN 8.9 02/27/2019        A1C RESULTS:  Lab Results   Component Value Date    A1C 7.5 (H) 04/15/2025       INR RESULTS:  Lab Results   Component Value Date    INR 1.03 05/02/2008    INR 1.04 03/13/2008       We greatly appreciate  the opportunity to be involved in the care of your patient, Travis Villa.    Sincerely,  Ambrocio Valdez MD      CC  Ambrocio Valdez MD  [address]  [place] [id number]                                                                       Thank you for allowing me to participate in the care of your patient.      Sincerely,     Ambrocio Valdez MD     [facility]  cc:   Ambrocio Valdez MD  [address]  [place] [id number]

## 2025-04-15 NOTE — PATIENT INSTRUCTIONS
Diabetes under fair control, bu you would benefit from Jardiance for additional help in weight loss, and reduction of future cardiac events.      Start Jardiance 10 mg once per day.      Consider the Repatha for cholesterol help.       Continue all other medications at the same doses.  Contact your usual pharmacy if you need refills.      Follow up with the Cardiology clinic as planned.      Resume Tamsulosin 0.4 mg once per day in the evening.      Return to see Urology to evaluate your urinary frequency.       Return to see me in 3 months.  Use [employer] or Call [phone] to schedule the appointment with me.       *  Use steroid nasal spray (available over the counter) 1-2 sprays into each nostril once per day, every day regardless of how you feel for the next 3-4 weeks.  If used for allergies, use longer if needed depending on the length of the allergy season.    This type of steroid nasal spray will take at least 7-10 days to reach full effect, please use it for at least 3 full weeks before deciding if it doesn't work for you.         --typical brands include Flonase (fluticasone) or Nasonex (mometasone) or Nasocort (triamcinolone)    Examples of steroid nasal spray:

## 2025-04-15 NOTE — PROGRESS NOTES
{PROVIDER CHARTING PREFERENCE:522407}    Subjective   Lele is a 79 year old, presenting for the following health issues:  Establish Care, Diabetes, Hypertension, and Lipids      4/15/2025     7:15 AM   Additional Questions   Roomed by Adry CROUCH CMA     Via the Health Maintenance questionnaire, the patient has reported the following services have been completed -[facility]  [id number] 2025-01-10, this information has been sent to the abstraction team.  History of Present Illness       CKD: He is not using over the counter pain medicine.     Diabetes:   He presents for follow up of diabetes.  He is checking home blood glucose three times daily.   He checks blood glucose before and after meals.  Blood glucose is sometimes over 200 and never under 70.  When his blood glucose is low, the patient is asymptomatic for confusion, blurred vision, lethargy and reports not feeling dizzy, shaky, or weak.  He is concerned about blood sugar frequently over 200.   He is having numbness in feet, burning in feet and weight gain.  The patient has had a diabetic eye exam in the last 12 months. Eye exam performed on Tommy 10 2025. Location of last eye exam [facility].        Hyperlipidemia:  He presents for follow up of hyperlipidemia.   He is taking medication to lower cholesterol. He is not having myalgia or other side effects to statin medications.    Hypertension: He presents for follow up of hypertension.  He does check blood pressure  regularly outside of the clinic. Outside blood pressures have been over 140/90. He does not follow a low salt diet.     He eats 2-3 servings of fruits and vegetables daily.He consumes 0 sweetened beverage(s) daily.He exercises with enough effort to increase his heart rate 9 or less minutes per day.  He exercises with enough effort to increase his heart rate 3 or less days per week.   He is taking medications regularly.          {additonal problems for provider to  "add (Optional):655718}    {ROS Picklists (Optional):598452}      Objective    Ht 1.88 m (6' 2\")   BMI 34.02 kg/m    Body mass index is 34.02 kg/m .  Physical Exam   {Exam List (Optional):712079}    {Diagnostic Test Results (Optional):041611}        Signed Electronically by: Luis E Faye MD  {Email feedback regarding this note to [email or web address]   :710998}  " 200.   He is having numbness in feet, burning in feet and weight gain.  The patient has had a diabetic eye exam in the last 12 months. Eye exam performed on Tommy 10 2025. [facility] of [facility] eye [facility].        Hyperlipidemia:  He presents for follow up of hyperlipidemia.   He is taking medication to lower cholesterol. He is not having myalgia or other side effects to statin medications.    Hypertension: He presents for follow up of hypertension.  He does check blood pressure  regularly outside of the clinic. Outside blood pressures have been over 140/90. He does not follow a low salt diet.     He eats 2-3 servings of fruits and vegetables daily.He consumes 0 sweetened beverage(s) daily.He exercises with enough effort to increase his heart rate 9 or less minutes per day.  He exercises with enough effort to increase his heart rate 3 or less days per week.   He is taking medications regularly.        The patient has had a history of obesity.  Reviewed the weigth curves.   Their current BMI is:  Body mass index is 35.95 kg/m .    Discussed current eating and exercise habits.     Reviewed weights in chart:  Wt Readings from Last 10 Encounters:   04/15/25 127 kg (280 lb)   04/15/25 127 kg (280 lb)   11/05/24 120.2 kg (265 lb)   05/06/24 119.3 kg (263 lb)   08/09/23 120.9 kg (266 lb 8 oz)   08/04/23 120.2 kg (265 lb)   02/19/23 122.8 kg (270 lb 12.8 oz)   11/29/22 115.7 kg (255 lb)   10/11/21 112 kg (247 lb)   10/06/20 123.8 kg (273 lb)        Prior of coronary artery disease as listed in medical history.  No current or recent cardiovascular symptoms.   Patient reports no shortness of breath, no episodes of chest pain/pressure, no dyspnea on exertion, no changes in their ability to perform physical exertion or tasks.  Takes the same amount of time to perform similar physical tasks.           **I reviewed the information recorded in the patient's EPIC chart (including but not limited to medical history,  "surgical history, family history, problem list, medication list, and allergy list) and updated the information as indicated based on the patients reported information.         Review of Systems  Constitutional, HEENT, cardiovascular, pulmonary, gi and gu systems are negative, except as otherwise noted.      Objective    BP (!) 145/84   Pulse 71   Temp 97.8  F (36.6  C) (Temporal)   Ht 1.88 m (6' 2\")   Wt 127 kg (280 lb)   SpO2 94%   BMI 35.95 kg/m    Body mass index is 35.95 kg/m .  Physical Exam   Physical Exam  Vitals and nursing note reviewed.   Constitutional:       Comments: Moves normally, alert, no apparent distress  HENT:      Head: Normocephalic and atraumatic.      Nose: Nose normal.   Eyes:      Extraocular Movements: Extraocular movements intact.   Cardiovascular:      Rate and Rhythm: Normal rate and regular rhythm.      Heart sounds: Normal heart sounds.   Pulmonary:      Effort: Pulmonary effort is normal.      Breath sounds: Normal breath sounds.   Abdominal:      General: There is no distension.      Palpations: There is no mass.      Tenderness: No abdominal tenderness, no distention.     Bowel sounds: normal  Musculoskeletal:         General: Normal range of motion, moves into the room normal, moves in and out of chair normally.    Skin:     General: Skin is warm and dry.   Neurological:      General: No focal deficit present.      Mental Status: Alert, responds to questions, Speech coherent  Psychiatric:         Mood and Affect: Mood normal.            The longitudinal plan of care for the diagnosis(es)/condition(s) as documented were addressed during this visit. Due to the added complexity in care, I will continue to support Lele in the subsequent management and with ongoing continuity of care.    Signed Electronically by: Luis E Faye MD    "

## 2025-04-15 NOTE — PROGRESS NOTES
HISTORY OF PRESENT ILLNESS:  Travis Villa a 79 year old male with coronary artery disease, aortic stenosis, hypertension, type 2 diabetes mellitus, obesity, prostatic hypertrophy, chronic low back pain, and statin intolerance, was seen today at your request for follow-up.     Since I last saw the patient in November 2023, the patient has transferred his care to Dr. Cano, who also cares for his wife Mouna.  He was seen in the emergency room on 4/7/2025 with sharp left arm discomfort, not typical for myocardial ischemia.  Troponin enzymes x 2 and ECG were unchanged.  Because the patient could not get into see Dr. Cano, follow-up was arranged with me today.    The patient saw his new internist Dr. Stallings this morning.  He has decided to resume evolocumab and empagliflozin, which he had stopped on his own in the past.  He has gained about 15 pounds, and plans to resume a Mediterranean-style weight loss diet.  He notes more dyspnea with exertion without angina or syncope although he is gained about 15 pounds since November 2024, which he attributes to inactivity and his diet.  He did sustain a groin injury which limit his activity for about 2 or 3 weeks, but is now healed.        Generalized fatigue  Moderate aortic stenosis (MG 29 mmHg, V-max 3.5 m/s, DI 0.38, BISMARK 1.5 cm  on 5/2024 TTE), probably asymptomatic (though fatigue could be his first symptom)  CAD s/p RCA and LCx PCI in 2008, with CCS 0 angina at present  No ischemia on 9/2020 Lexiscan MPI  Severe statin intolerance (failed rosuvastatin, atorvastatin, and pravastatin)  Zetia intolerance  Ascending aortic dilatation (4.0 cm on 5/2024 TTE)  Essential Hypertension   Dyslipidemia, poorly controlled at present   NIDDM2  Colon adenoma s/p right hemicolectomy with umbilical hernia repair in 2/2023  Prostate hypertrophy s/p TURP in 8/2023, with resultant urinary incontinence  Osteoarthritis  Never smoker    Orders this Visit:  No orders of the defined types  were placed in this encounter.    No orders of the defined types were placed in this encounter.    There are no discontinued medications.    No diagnosis found.    CURRENT MEDICATIONS:  Current Outpatient Medications   Medication Sig Dispense Refill    albuterol (PROAIR HFA/PROVENTIL HFA/VENTOLIN HFA) 108 (90 Base) MCG/ACT inhaler Inhale 2 puffs into the lungs every 6 hours as needed for shortness of breath, wheezing or cough      aspirin 81 MG EC tablet Take 81 mg by mouth 2 times daily      Berberine Chloride (BERBERINE HCI PO) Take by mouth daily Helps regulate blood sugars      carvedilol (COREG) 25 MG tablet Take 1 tablet (25 mg) by mouth 2 times daily (with meals). 180 tablet 3    Cholecalciferol (VITAMIN D3 PO) Take 5,000 Units by mouth 4 times daily      CINNAMON PO Take 2,000 mg by mouth 2 times daily      Coenzyme Q10 (COQ-10) 200 MG CAPS Take by mouth daily      diclofenac (VOLTAREN) 1 % topical gel Apply topically as needed for moderate pain      empagliflozin (JARDIANCE) 10 MG TABS tablet Take 1 tablet (10 mg) by mouth daily. 90 tablet 0    evolocumab (REPATHA) 140 MG/ML prefilled autoinjector Inject 1 mL (140 mg) Subcutaneous every 14 days 6 mL 3    fish oil-omega-3 fatty acids 1000 MG capsule Take 1 capsule by mouth 2 times daily (with meals)      folic acid 800 MCG tablet Take 800 mcg by mouth daily      GLUCOSAMINE SULFATE PO Take 1 tablet by mouth daily      hydrochlorothiazide (MICROZIDE) 12.5 MG capsule Take 2 capsules (25 mg) by mouth daily. 180 capsule 3    magnesium oxide 400 MG CAPS Take by mouth daily      metFORMIN (GLUCOPHAGE) 500 MG tablet Take 500 mg by mouth 2 times daily (with meals)       multivitamin w/minerals (THERA-VIT-M) tablet Take 1 tablet by mouth daily      potassium gluconate 2.5 MEQ tablet Take by mouth daily      ramipril (ALTACE) 10 MG capsule Take 1 capsule (10 mg) by mouth 2 times daily. 180 capsule 3    RESVERATROL PO Take by mouth daily      tamsulosin (FLOMAX) 0.4 MG  "capsule Take 0.4 mg by mouth every evening.      vitamin B-Complex Take 1 tablet by mouth daily      vitamin C (ASCORBIC ACID) 1000 MG TABS Take 1,000 mg by mouth daily      Apple Cider Vinegar 600 MG CAPS Take by mouth 2 times daily. (Patient not taking: Reported on 4/15/2025)      cyanocobalamin (VITAMIN B-12) 1000 MCG tablet Take 1,000 mcg by mouth 2 times daily. (Patient not taking: Reported on 4/15/2025)      Flaxseed, Linseed, OIL Take 1 tablet by mouth daily. (Patient not taking: Reported on 4/15/2025)      Garlic 1000 MG CAPS Take by mouth daily. (Patient not taking: Reported on 4/15/2025)      nitroGLYcerin (NITROSTAT) 0.4 MG sublingual tablet Place 1 tablet (0.4 mg) under the tongue every 5 minutes as needed for chest pain (Patient not taking: Reported on 4/15/2025) 25 tablet 3    Saw Palmetto 500 MG CAPS Take by mouth 2 times daily. (Patient not taking: Reported on 4/15/2025)      traZODone (DESYREL) 50 MG tablet Take 1 tablet (50 mg) by mouth nightly as needed for sleep (Patient not taking: Reported on 4/15/2025) 10 tablet 0    vitamin E (TOCOPHEROL) 400 units (180 mg) capsule Take by mouth daily (Patient not taking: Reported on 4/15/2025)         ALLERGIES     Allergies   Allergen Reactions    Atorvastatin Other (See Comments)     Muscle aches    Seasonal Allergies        PAST MEDICAL, SURGICAL, FAMILY, SOCIAL HISTORY:  History was reviewed and updated as needed, see medical record.        Review of Systems:  A 12-point review of systems was completed, see medical record for detailed review of systems information.    Physical Exam:  Vitals: Ht 1.88 m (6' 2\")   Wt 127 kg (280 lb)   BMI 35.95 kg/m      Constitutional: No apparent distress    HEENT: pupils equal round reactive to light, thyroid normal size, JVP is normal    Chest: Clear to percussion and auscultation    Cardiac: Normal S1, normal S2 no S3, 3/6 systolic ejection murmur     ASSESSMENT: Although obesity and deconditioning are undoubtedly " contributing to his exertional dyspnea, the patient has documented aortic stenosis, and I favor a repeat echocardiogram.  I am pleased that the patient plans to engage in a Mediterranean-style diet, regular exercise program, and resume both empagliflozin and evolocumab  as both his cardiologist and internist have advised.  Will repeat an echo at this time and the patient will continue to follow with Dr. Cano and his APPS.     RECOMMENDATIONS:   1) TTE to reassess aortic valve  2) follow up with CYDNEY 5/2025 as previously scheduled   3)  Agree with Mediterranean style diet, resuming evolocumab, empagliflozin         Recent Lab Results:  LIPID RESULTS:  Lab Results   Component Value Date    CHOL 182 10/28/2024    CHOL 190 12/06/2017    HDL 40 10/28/2024    HDL 32 12/06/2017     (H) 10/28/2024     12/06/2017    TRIG 134 10/28/2024    TRIG 229 12/06/2017    CHOLHDLRATIO 3.9 03/13/2014       LIVER ENZYME RESULTS:  Lab Results   Component Value Date    AST 23 07/31/2023    AST 33 12/29/2012    ALT 21 10/28/2024    ALT 6 03/13/2014       CBC RESULTS:  Lab Results   Component Value Date    WBC 7.3 04/07/2025    WBC 7.4 02/27/2019    RBC 5.28 04/07/2025    RBC 5.08 02/27/2019    HGB 14.4 04/07/2025    HGB 14.3 02/27/2019    HCT 44.5 04/07/2025    HCT 42.3 02/27/2019    MCV 84 04/07/2025    MCV 83 02/27/2019    MCH 27.3 04/07/2025    MCH 28.1 02/27/2019    MCHC 32.4 04/07/2025    MCHC 33.8 02/27/2019    RDW 14.2 04/07/2025    RDW 14.3 02/27/2019     04/07/2025     02/27/2019       BMP RESULTS:  Lab Results   Component Value Date     04/07/2025     02/27/2019    POTASSIUM 4.0 04/07/2025    POTASSIUM 4.0 02/27/2019    CHLORIDE 98 04/07/2025    CHLORIDE 96 02/27/2019    CO2 30 (H) 04/07/2025    CO2 30 02/27/2019    ANIONGAP 9 04/07/2025    ANIONGAP 8 02/27/2019     (H) 04/07/2025     (H) 02/19/2023     (H) 02/27/2019    BUN 11.9 04/07/2025    BUN 14 02/27/2019    CR  0.51 (L) 04/07/2025    CR 0.46 (L) 02/27/2019    GFRESTIMATED >90 04/07/2025    GFRESTIMATED >60 01/24/2023    GFRESTIMATED >90 02/27/2019    GFRESTBLACK >90 02/27/2019    LEIGH ANN 9.0 04/07/2025    LEIGH ANN 8.9 02/27/2019        A1C RESULTS:  Lab Results   Component Value Date    A1C 7.5 (H) 04/15/2025       INR RESULTS:  Lab Results   Component Value Date    INR 1.03 05/02/2008    INR 1.04 03/13/2008       We greatly appreciate the opportunity to be involved in the care of your patient, Travis Villa.    Sincerely,  Ambrocio Valdez MD        Ambrocio Valdez MD  [address]  [place] [id number]

## 2025-04-26 ENCOUNTER — OFFICE VISIT (OUTPATIENT)
Dept: URGENT CARE | Facility: URGENT CARE | Age: 80
End: 2025-04-26
Payer: COMMERCIAL

## 2025-04-26 VITALS
DIASTOLIC BLOOD PRESSURE: 74 MMHG | TEMPERATURE: 98.6 F | HEART RATE: 91 BPM | OXYGEN SATURATION: 90 % | RESPIRATION RATE: 19 BRPM | SYSTOLIC BLOOD PRESSURE: 123 MMHG

## 2025-04-26 DIAGNOSIS — I35.0 MODERATE AORTIC STENOSIS: ICD-10-CM

## 2025-04-26 DIAGNOSIS — R05.1 ACUTE COUGH: Primary | ICD-10-CM

## 2025-04-26 PROCEDURE — 3078F DIAST BP <80 MM HG: CPT | Performed by: FAMILY MEDICINE

## 2025-04-26 PROCEDURE — 3074F SYST BP LT 130 MM HG: CPT | Performed by: FAMILY MEDICINE

## 2025-04-26 PROCEDURE — 87635 SARS-COV-2 COVID-19 AMP PRB: CPT | Performed by: FAMILY MEDICINE

## 2025-04-26 PROCEDURE — 99213 OFFICE O/P EST LOW 20 MIN: CPT | Performed by: FAMILY MEDICINE

## 2025-04-26 NOTE — PROGRESS NOTES
Urgent Care Clinic Visit    Chief Complaint   Patient presents with    Cough     Acute cough and low O2 at home. Want covid test                4/26/2025    11:06 AM   Additional Questions   Roomed by Kwasi Aguilera MA   Accompanied by Wife- Wendy           Coughing up phlegm chronically    Maybe a bit more than usual    No fever    Feels cold all the time    Patient wants covid test    We are  also seeing wife of  patient;she  has the more acute issues but he wanted to be seen since they are here  ROS    Physical Exam  Constitutional:       Appearance: He is well-developed.   HENT:      Head: Normocephalic and atraumatic.      Right Ear: Tympanic membrane, ear canal and external ear normal.      Left Ear: Tympanic membrane, ear canal and external ear normal.      Nose: Nose normal.      Mouth/Throat:      Mouth: Mucous membranes are moist.      Pharynx: Oropharynx is clear.   Eyes:      Conjunctiva/sclera: Conjunctivae normal.   Neck:      Vascular: No carotid bruit.   Cardiovascular:      Rate and Rhythm: Normal rate and regular rhythm.      Heart sounds: Murmur (this is known; followed by cards) heard.   Pulmonary:      Effort: Pulmonary effort is normal. No respiratory distress.      Breath sounds: Normal breath sounds.   Neurological:      Mental Status: He is alert and oriented to person, place, and time.      Cranial Nerves: No cranial nerve deficit.   Psychiatric:         Speech: Speech normal.         Behavior: Behavior normal.     No pitting edema    Radials  symmetric  Of note patient had fairly recent cxr, no acute changes at that time    ASSESSMENT / PLAN:  (R05.1) Acute cough  (primary encounter diagnosis)  Comment: exam and vitals  reassuring.  Per patient he often  runs about  90 on sats.    Not short of breath.    Plan: COVID-19 Virus (Coronavirus) by PCR Nose        Covid result pending.     (I35.0) Moderate aortic stenosis  Comment: followed by cardiology   Plan: as above      Be seen promptly  if symptoms acutely worsen       I reviewed the patient's medications, allergies, medical history, family history, and social history.    Grey Frances MD

## 2025-04-26 NOTE — PATIENT INSTRUCTIONS
Check mychart for covid result    Continue usual medications    Follow up as needed based on symptoms     Be seen promptly if symptoms acutely worsen

## 2025-04-27 ENCOUNTER — TELEPHONE (OUTPATIENT)
Dept: NURSING | Facility: CLINIC | Age: 80
End: 2025-04-27
Payer: COMMERCIAL

## 2025-04-27 ENCOUNTER — NURSE TRIAGE (OUTPATIENT)
Dept: NURSING | Facility: CLINIC | Age: 80
End: 2025-04-27
Payer: COMMERCIAL

## 2025-04-27 LAB — SARS-COV-2 RNA RESP QL NAA+PROBE: POSITIVE

## 2025-04-27 NOTE — CONFIDENTIAL NOTE
Gave pt the number for virtual urgent care appointment and will call in the morning. No appointments tonight.    Ariel Cotto CMA on 4/27/2025 at 5:03 PM

## 2025-04-27 NOTE — TELEPHONE ENCOUNTER
Coronavirus (COVID-19) Notification    Caller Name (Patient, parent, daughter/son, grandparent, etc)  Patient    Reason for call  Notify of Positive Coronavirus (COVID-19) lab results, assess symptoms,  review [facility] recommendations    Lab Result    Lab test:  2019-nCoV rRt-PCR or SARS-CoV-2 PCR    Oropharyngeal AND/OR nasopharyngeal swabs is POSITIVE for 2019-nCoV RNA/SARS-COV-2 PCR (COVID-19 virus)      Gather patient reported symptoms   Assessment   Current Symptoms at time of phone call, reported by patient: (if no symptoms, document: No symptoms] Cough and runny nose    Date of symptom(s) onset (if applicable) About a week      If at time of call, Patients symptoms have worsened, the Patient should contact 911 or have someone drive them to Emergency Dept promptly:    If Patient calling 911, inform 911 personal that you have tested positive for the Coronavirus (COVID-19).  Place mask on and await 911 to arrive.  If Emergency Dept, If possible, please have another adult drive you to the Emergency Dept but you need to wear mask when in contact with other people.      Treatment Options:   Is patient interested in discussing COVID treatment? Yes.   Is this a [place] or [place] Patient? No:     Are you an agent trained to scheduling? No.   COVID Positive/Requesting COVID treatment    Patient is positive for COVID and requesting treatment options.    Date of positive COVID test (PCR or at home)? 04/26/2025  Current COVID symptoms: cough and congestion or runny nose  Date COVID symptoms began: about a week for cough, runny nose 2 days     Message should be routed to clinic RN pool. Best phone number to use for call back: [phone]       Review information with Patient    Your result was positive. This means you have COVID-19 (coronavirus).    How can I protect others?    These guidelines are for isolating before returning to work, school or .    If you DO have symptoms  Stay home and away from  others   For at least 5 days after your symptoms started, AND  You are fever free for 24 hours (with no medicine that reduces fever), AND  Your other symptoms are better  Wear a mask for 10 full days anytime you are around others    If you DON'T have symptoms  Stay home and away from others for at least 5 days after your positive test  Wear a mask for 10 full days anytime you are around others    There may be different guidelines for healthcare facilities.  Please check with the specific sites before arriving.    If you have been told by a doctor that you were severely ill with COVID-19 or are immunocompromised, you should isolate for at least 10 days.    You should not go back to work until you meet the guidelines above for ending your home isolation. You don't need to be retested for COVID-19 before going back to work--studies show that you won't spread the virus if it's been at least 10 days since your symptoms started (or 20 days, if you have a weak immune system).    Employers, schools, and daycares: This is an official notice for this person's medical guidelines for returning in-person.  They must meet the above guidelines before going back to work, school or  in person.    You will receive a positive COVID-19 letter via [employer] or the mail soon with additional self-care information.    Would you like me to review some of that information with you now?  No    If you were tested for an upcoming procedure, please contact your provider for next steps.    Divya Henry

## 2025-04-27 NOTE — TELEPHONE ENCOUNTER
Nurse Triage SBAR    Situation: Covid Treatment.     Background: Patient calling. Pt was seen in urgent care yesterday. Pt states he tested positive for covid.     Assessment: Patient is wanting treatment for covid. The nurse that gave him his results transferred him to [facility].     Recommendation: RN attempted to warm transfer patient to Urgent care 3 times. No answer. Will route to urgent care. Please contact patient for treatment options.     Darshana Hart RN Nursing Advisor 4/27/2025 2:04 PM     Reason for Disposition   [1] Caller requesting NON-URGENT health information AND [2] PCP's office is the best resource   Prescription request for new medicine (not a refill)    Protocols used: Information Only Call - No Triage-A-AH, Medication Question Call-A-AH

## 2025-04-28 ENCOUNTER — VIRTUAL VISIT (OUTPATIENT)
Dept: INTERNAL MEDICINE | Facility: CLINIC | Age: 80
End: 2025-04-28
Payer: COMMERCIAL

## 2025-04-28 DIAGNOSIS — U07.1 INFECTION DUE TO 2019 NOVEL CORONAVIRUS: Primary | ICD-10-CM

## 2025-04-28 PROCEDURE — 98005 SYNCH AUDIO-VIDEO EST LOW 20: CPT | Performed by: INTERNAL MEDICINE

## 2025-04-28 NOTE — PROGRESS NOTES
"Lele is a 79 year old who is being evaluated via a billable video visit.    How would you like to obtain your AVS? MyChart  If the video visit is dropped, the invitation should be resent by: Text to cell phone: [phone]  Will anyone else be joining your video visit? No      Assessment & Plan     Infection due to 2019 novel coronavirus  Discussed options.  Will start Paxlovid at regular dose (normal kidney function).  Only drug interaction with his current list is trazodone, which patient is actually not taking currently.  - nirmatrelvir and ritonavir (PAXLOVID) 300 mg/100 mg therapy pack; Take 3 tablets by mouth 2 times daily for 5 days.          BMI  Estimated body mass index is 35.95 kg/m  as calculated from the following:    Height as of 4/15/25: 1.88 m (6' 2\").    Weight as of 4/15/25: 127 kg (280 lb).             Subjective   Lele is a 79 year old, presenting for the following health issues:  Covid Concern  See Nurse Triage note       Video Start Time: 5:00 PM            COVID-19 Symptom Review  How many days ago did these symptoms start? Cough for 2 months     Are any of the following symptoms significant for you?  New or worsening difficulty breathing? No  Worsening cough? Yes, I am coughing up mucus.  Fever or chills? No  Headache: No  Sore throat: No  Chest pain: No  Diarrhea: YES  Body aches? YES    What treatments has patient tried? Decongestant - nasal and Cough syrup   Does patient live in a nursing home, group home, or shelter? No  Does patient have a way to get food/medications during quarantined? Yes                 Review of Systems  Constitutional, HEENT, cardiovascular, pulmonary, gi and gu systems are negative, except as otherwise noted.      Objective           Vitals:  No vitals were obtained today due to virtual visit.    Physical Exam   GENERAL: alert and no distress  EYES: Eyes grossly normal to inspection.  No discharge or erythema, or obvious scleral/conjunctival abnormalities.  RESP: No " audible wheeze, cough, or visible cyanosis.    SKIN: Visible skin clear. No significant rash, abnormal pigmentation or lesions.  NEURO: Cranial nerves grossly intact.  Mentation and speech appropriate for age.  PSYCH: Appropriate affect, tone, and pace of words          Video-Visit Details    Type of service:  Video Visit   Video End Time:5:08 PM  Originating Location (pt. Location): Home    Distant Location (provider location):  On-site  Platform used for Video Visit: Robson  Signed Electronically by: Nomi Cloud MD

## 2025-05-03 ENCOUNTER — HEALTH MAINTENANCE LETTER (OUTPATIENT)
Age: 80
End: 2025-05-03

## 2025-06-10 ENCOUNTER — RESULTS FOLLOW-UP (OUTPATIENT)
Dept: CARDIOLOGY | Facility: CLINIC | Age: 80
End: 2025-06-10

## 2025-06-10 ENCOUNTER — HOSPITAL ENCOUNTER (OUTPATIENT)
Dept: CARDIOLOGY | Facility: CLINIC | Age: 80
Discharge: HOME | End: 2025-06-10
Attending: INTERNAL MEDICINE
Payer: COMMERCIAL

## 2025-06-10 DIAGNOSIS — I10 BENIGN ESSENTIAL HYPERTENSION: ICD-10-CM

## 2025-06-10 DIAGNOSIS — E11.9 TYPE 2 DIABETES MELLITUS WITHOUT COMPLICATION, WITHOUT LONG-TERM CURRENT USE OF INSULIN (H): ICD-10-CM

## 2025-06-10 DIAGNOSIS — E78.2 MIXED HYPERLIPIDEMIA: ICD-10-CM

## 2025-06-10 DIAGNOSIS — I35.0 NONRHEUMATIC AORTIC VALVE STENOSIS: ICD-10-CM

## 2025-06-10 DIAGNOSIS — I25.10 CORONARY ARTERY DISEASE INVOLVING NATIVE CORONARY ARTERY OF NATIVE HEART WITHOUT ANGINA PECTORIS: ICD-10-CM

## 2025-06-10 LAB — LVEF ECHO: NORMAL

## 2025-06-10 PROCEDURE — C8929 TTE W OR WO FOL WCON,DOPPLER: HCPCS

## 2025-06-10 PROCEDURE — 255N000002 HC RX 255 OP 636: Performed by: INTERNAL MEDICINE

## 2025-06-10 RX ADMIN — HUMAN ALBUMIN MICROSPHERES AND PERFLUTREN 4 ML: 10; .22 INJECTION, SOLUTION INTRAVENOUS at 08:02

## 2025-07-08 ENCOUNTER — OFFICE VISIT (OUTPATIENT)
Dept: INTERNAL MEDICINE | Facility: CLINIC | Age: 80
End: 2025-07-08
Payer: COMMERCIAL

## 2025-07-08 VITALS
BODY MASS INDEX: 37.93 KG/M2 | SYSTOLIC BLOOD PRESSURE: 146 MMHG | WEIGHT: 280 LBS | HEART RATE: 62 BPM | OXYGEN SATURATION: 94 % | TEMPERATURE: 97.5 F | HEIGHT: 72 IN | DIASTOLIC BLOOD PRESSURE: 84 MMHG

## 2025-07-08 DIAGNOSIS — G56.01 CARPAL TUNNEL SYNDROME OF RIGHT WRIST: ICD-10-CM

## 2025-07-08 DIAGNOSIS — E11.59 TYPE 2 DIABETES MELLITUS WITH OTHER CIRCULATORY COMPLICATION, WITHOUT LONG-TERM CURRENT USE OF INSULIN (H): ICD-10-CM

## 2025-07-08 DIAGNOSIS — N30.00 ACUTE CYSTITIS WITHOUT HEMATURIA: ICD-10-CM

## 2025-07-08 DIAGNOSIS — I25.10 CORONARY ARTERY DISEASE INVOLVING NATIVE CORONARY ARTERY OF NATIVE HEART WITHOUT ANGINA PECTORIS: ICD-10-CM

## 2025-07-08 DIAGNOSIS — I35.0 NONRHEUMATIC AORTIC VALVE STENOSIS: ICD-10-CM

## 2025-07-08 DIAGNOSIS — R07.9 CHEST PAIN, UNSPECIFIED TYPE: ICD-10-CM

## 2025-07-08 DIAGNOSIS — Z00.00 MEDICARE ANNUAL WELLNESS VISIT, SUBSEQUENT: Primary | ICD-10-CM

## 2025-07-08 DIAGNOSIS — R30.0 DYSURIA: ICD-10-CM

## 2025-07-08 DIAGNOSIS — N13.8 BPH WITH OBSTRUCTION/LOWER URINARY TRACT SYMPTOMS: ICD-10-CM

## 2025-07-08 DIAGNOSIS — E66.01 SEVERE OBESITY (BMI 35.0-39.9) WITH COMORBIDITY (H): ICD-10-CM

## 2025-07-08 DIAGNOSIS — R00.1 BRADYCARDIA: ICD-10-CM

## 2025-07-08 DIAGNOSIS — I10 BENIGN ESSENTIAL HYPERTENSION: ICD-10-CM

## 2025-07-08 DIAGNOSIS — I10 ESSENTIAL HYPERTENSION, BENIGN: ICD-10-CM

## 2025-07-08 DIAGNOSIS — N40.1 BPH WITH OBSTRUCTION/LOWER URINARY TRACT SYMPTOMS: ICD-10-CM

## 2025-07-08 LAB
ALBUMIN UR-MCNC: 30 MG/DL
APPEARANCE UR: ABNORMAL
BACTERIA #/AREA URNS HPF: ABNORMAL /HPF
BILIRUB UR QL STRIP: NEGATIVE
COLOR UR AUTO: YELLOW
GLUCOSE UR STRIP-MCNC: >=1000 MG/DL
HGB UR QL STRIP: ABNORMAL
KETONES UR STRIP-MCNC: ABNORMAL MG/DL
LEUKOCYTE ESTERASE UR QL STRIP: ABNORMAL
MUCOUS THREADS #/AREA URNS LPF: PRESENT /LPF
NITRATE UR QL: NEGATIVE
PH UR STRIP: 7 [PH] (ref 5–7)
RBC #/AREA URNS AUTO: ABNORMAL /HPF
SP GR UR STRIP: 1.01 (ref 1–1.03)
SQUAMOUS #/AREA URNS AUTO: ABNORMAL /LPF
UROBILINOGEN UR STRIP-ACNC: 0.2 E.U./DL
WBC #/AREA URNS AUTO: ABNORMAL /HPF
WBC CLUMPS #/AREA URNS HPF: PRESENT /HPF

## 2025-07-08 PROCEDURE — 81001 URINALYSIS AUTO W/SCOPE: CPT | Performed by: INTERNAL MEDICINE

## 2025-07-08 PROCEDURE — 3077F SYST BP >= 140 MM HG: CPT | Performed by: INTERNAL MEDICINE

## 2025-07-08 PROCEDURE — 99214 OFFICE O/P EST MOD 30 MIN: CPT | Mod: 25 | Performed by: INTERNAL MEDICINE

## 2025-07-08 PROCEDURE — 87086 URINE CULTURE/COLONY COUNT: CPT | Performed by: INTERNAL MEDICINE

## 2025-07-08 PROCEDURE — 3079F DIAST BP 80-89 MM HG: CPT | Performed by: INTERNAL MEDICINE

## 2025-07-08 PROCEDURE — G2211 COMPLEX E/M VISIT ADD ON: HCPCS | Performed by: INTERNAL MEDICINE

## 2025-07-08 PROCEDURE — 1125F AMNT PAIN NOTED PAIN PRSNT: CPT | Performed by: INTERNAL MEDICINE

## 2025-07-08 PROCEDURE — G0438 PPPS, INITIAL VISIT: HCPCS | Performed by: INTERNAL MEDICINE

## 2025-07-08 RX ORDER — SULFAMETHOXAZOLE AND TRIMETHOPRIM 800; 160 MG/1; MG/1
1 TABLET ORAL 2 TIMES DAILY
Qty: 42 TABLET | Refills: 0 | Status: SHIPPED | OUTPATIENT
Start: 2025-07-08 | End: 2025-07-29

## 2025-07-08 RX ORDER — TAMSULOSIN HYDROCHLORIDE 0.4 MG/1
0.4 CAPSULE ORAL EVERY EVENING
Qty: 90 CAPSULE | Refills: 3 | Status: SHIPPED | OUTPATIENT
Start: 2025-07-08

## 2025-07-08 RX ORDER — METFORMIN HYDROCHLORIDE 500 MG/1
1000 TABLET, EXTENDED RELEASE ORAL
Qty: 180 TABLET | Refills: 3 | Status: SHIPPED | OUTPATIENT
Start: 2025-07-08

## 2025-07-08 ASSESSMENT — PAIN SCALES - GENERAL: PAINLEVEL_OUTOF10: MODERATE PAIN (6)

## 2025-07-08 NOTE — PROGRESS NOTES
"  {PROVIDER CHARTING PREFERENCE:540356}    Subjective   Lele is a 80 year old, presenting for the following health issues:  Kidney Problem, Diabetes, Hypertension, and Coronary Artery Disease      7/8/2025     3:00 PM   Additional Questions   Roomed by Adry CROUCH CMA     Kidney Problem    History of Present Illness       CKD: He is not using over the counter pain medicine.     Diabetes:   He presents for follow up of diabetes.  He is checking home blood glucose four or more times daily.   He checks blood glucose before meals, after meals, before and after meals and at bedtime.  Blood glucose is sometimes over 200 and never under 70.  When his blood glucose is low, the patient is asymptomatic for confusion, blurred vision, lethargy and reports not feeling dizzy, shaky, or weak.  He is concerned about frequent infections.   He is having numbness in feet, burning in feet, excessive thirst and weight gain.            Hypertension: He presents for follow up of hypertension.  He does check blood pressure  regularly outside of the clinic. Outside blood pressures have been over 140/90. He does not follow a low salt diet.     Vascular Disease:  He presents for follow up of vascular disease.      He takes daily aspirin.    He eats 2-3 servings of fruits and vegetables daily.He consumes 0 sweetened beverage(s) daily.He exercises with enough effort to increase his heart rate 30 to 60 minutes per day.  He exercises with enough effort to increase his heart rate 3 or less days per week.   He is taking medications regularly.        {additonal problems for provider to add (Optional):814725}    {ROS Picklists (Optional):644527}      Objective    Ht 1.816 m (5' 11.5\")   BMI 37.82 kg/m    Body mass index is 37.82 kg/m .  Physical Exam   {Exam List (Optional):457238}    {Diagnostic Test Results (Optional):708451}        Signed Electronically by: Luis E Faye MD  {Email feedback regarding this note to " [email or web address]   :[id number]}   Nonrheumatic aortic valve stenosis  Comment: This condition is currently controlled on the current medical regimen.  Continue current therapy.   Plan:     (E11.59) Type 2 diabetes mellitus with other circulatory complication, without long-term current use of insulin (H)  Comment: developed hives from Jardiance.   patient would benefit from GLP-1 medicaiton for DM II, obesity, and coronary artery disease   After further discussion, the patient would like to try GLP-1 medication.    We will prescribe Tirzepatide (Mounjaro/Zepound).  Reduce metforrmin dose  Start 2.5 mg once weekly for one month, then progress to the 5.0 mg once weekly dose until follow up appointment.   Reviewed main side effects from this medication including, but are not limited to, nausea, vomiting, stomach upset, and very rarely pancreatitis.    Told him to stop the medication and contact us if you develop any concerning possible side effects.  Follow up via virtual visit in 2 months regardless of how he feels to discuss potential changes in the medication dosing.   If insurance will not covered it, a prior authorization can be attempted, but the patient told that the insurers may have strict criteria for coverage for this medication and if the patient does not meet this criteria, then they can still receive the medication, but would have to pay cash for it.  Regardless of starting this medication, they were still instructed to continue to make all efforts at better diet changes aimed at losing weight and controlling diabetes.     Plan: metFORMIN (GLUCOPHAGE XR) 500 MG 24 hr tablet,         tirzepatide (MOUNJARO) 2.5 MG/0.5ML SOAJ         auto-injector pen            (R07.9) Chest pain, unspecified type  Comment:   Plan:     (R30.0) Dysuria  Comment: UTI present, treat as ordered.   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   Plan: tamsulosin (FLOMAX) 0.4 MG capsule, Adult         Urology  Referral            (G56.01) Carpal tunnel syndrome of right wrist  Comment: Discussed carpal tunnel syndrome at length, including the causative features, the pathophysiology, conservative treatments (wrists braces, NSAIDs with food, modifying activities involving hands, ice, etc) and indications for surgical release and further testing (i.e. EMG).   Referral to Orthopedics should he fail conservative measures.   Please see the orthopedic hand surgeon about your carpal tunnel syndrome.       --[facility] [phone]   [email or web address]      --[facility] [place] [phone]   [email or web address]    Plan: Orthopedic  Referral            (N30.00) Acute cystitis without hematuria  Comment: as above.   Plan: sulfamethoxazole-trimethoprim (BACTRIM DS)         800-160 MG tablet, Adult Urology          Referral               BMI  Estimated body mass index is 38.51 kg/m  as calculated from the following:    Height as of this encounter: 1.816 m (5' 11.5\").    Weight as of this encounter: 127 kg (280 lb).     Blood sugar testing frequency justification:        Virginie Royal is a 80 year old, presenting for the following health issues:  Kidney Problem, Diabetes, Hypertension, and Coronary Artery Disease        7/8/2025     3:00 PM   Additional Questions   Roomed by Adry CROUCH CMA     Kidney Problem    History of Present Illness       CKD: He is not using over the counter pain medicine.     Diabetes:   He presents for follow up of diabetes.  He is checking home blood glucose four or more times daily.   He checks blood glucose before meals, after meals, before and after meals and at bedtime.  Blood glucose is sometimes over " 200 and never under 70.  When his blood glucose is low, the patient is asymptomatic for confusion, blurred vision, lethargy and reports not feeling dizzy, shaky, or weak.  He is concerned about frequent infections.   He is having numbness in feet, burning in feet, excessive thirst and weight gain.            Hypertension: He presents for follow up of hypertension.  He does check blood pressure  regularly outside of the clinic. Outside blood pressures have been over 140/90. He does not follow a low salt diet.     Vascular Disease:  He presents for follow up of vascular disease.      He takes daily aspirin.    He eats 2-3 servings of fruits and vegetables daily.He consumes 0 sweetened beverage(s) daily.He exercises with enough effort to increase his heart rate 30 to 60 minutes per day.  He exercises with enough effort to increase his heart rate 3 or less days per week.   He is taking medications regularly.        Devloped hives with jardiance, resovled when he stopped it.      complains of urinary frequency.   No hematuria or pain.       Also Reports numbness in both hands over the past several months.    Involves specifically the 1st through 3rd fingers.     Worse after heavy use of the hands, in the nighttime, and upon awakening.    Has not tried wrist braces.    No decreased strength in fingers, no dropping objects, etc.   No arm numbness other than that distal to the wrist.  No relation or change with head or neck movement.  No other numbness anywhere else in the body.      Annual Wellness Visit     Patient has been advised of split billing requirements and indicates understanding: Yes         Health Care Directive  Patient does not have a Health Care Directive: Discussed advance care planning with patient; however, patient declined at this time.  In general, how would you rate your overall physical health? good  Do you have a special diet?  Regular (no restrictions)         No data to display              Do you  see a dentist two times every year?  Yes  Have you been more tired than usual lately?  No  If you drink alcohol do you typically have >3 drinks per day or >7 drinks per week? No  Do you have a current opioid prescription? No  Do you use any other controlled substances or medications that are not prescribed by a provider? None  Social History     Tobacco Use    Smoking status: Never    Smokeless tobacco: Never   Substance Use Topics    Alcohol use: No    Drug use: No       Needs assistance for the following daily activities: no assistance needed  Which of the following safety concerns are present in your home?  none identified   Do you (or your family members) have any concerns about your safety while driving?  No  Do you have any of the following hearing concerns?: No hearing concerns  In the past 6 months, have you been bothered by leaking of urine? No        4/15/2025   Social Factors   Worry food won't last until get money to buy more No   Food not last or not have enough money for food? No   Do you have housing? (Housing is defined as stable permanent housing and does not include staying outside in a car, in a tent, in an abandoned building, in an overnight shelter, or couch-surfing.) Yes   Are you worried about losing your housing? No   Lack of transportation? No   Unable to get utilities (heat,electricity)? No         4/15/2025   Fall Risk   Fallen 2 or more times in the past year? No   Trouble with walking or balance? Yes   Gait Speed Test (Document in seconds) 4.28   Gait Speed Test Interpretation Less than or equal to 5.00 seconds - PASS                      Reviewed and updated as needed this visit by Provider    Allergies     Surg Hx             **I reviewed the information recorded in the patient's EPIC chart (including but not limited to medical history, surgical history, family history, problem list, medication list, and allergy list) and updated the information as indicated based on the patients  reported information.           Current providers sharing in care for this patient include:  Patient Care Team:  Luis E Faey MD as PCP - General (Internal Medicine)  Suha Rocha APRN CNP as Nurse Practitioner (Cardiovascular Disease)  Ambrocio Valdez MD as MD (Cardiovascular Disease)  Luis E Faye MD as Assigned PCP  Kit Cano MD as Assigned Heart and Vascular Provider    The following health maintenance items are reviewed in Epic and correct as of today:  Health Maintenance   Topic Date Due    DIABETIC FOOT EXAM  Never done    ANNUAL REVIEW OF HM ORDERS  Never done    ZOSTER VACCINE (1 of 2) Never done    RSV VACCINE (1 - 1-dose 75+ series) Never done    COVID-19 VACCINE (6 - 2024-25 season) 05/05/2025    INFLUENZA VACCINE (1) 09/01/2025    A1C  10/15/2025    EYE EXAM  01/10/2026    MICROALBUMIN  04/15/2026    FALL RISK ASSESSMENT  04/15/2026    BMP  05/02/2026    LIPID  05/02/2026    MEDICARE ANNUAL WELLNESS VISIT  07/08/2026    DTAP/TDAP/TD VACCINE (3 - Td or Tdap) 12/05/2027    ADVANCE CARE PLANNING  07/08/2030    PHQ-2 (once per calendar year)  Completed    PNEUMOCOCCAL VACCINE 50+ YEARS  Completed    HPV VACCINE  Aged Out    MENINGITIS VACCINE  Aged Out    TSH W/FREE T4 REFLEX  Discontinued    COLORECTAL CANCER SCREENING  Discontinued       Appropriate preventive services were discussed with this patient, including applicable screening as appropriate for fall prevention, nutrition, physical activity, Tobacco-use cessation, weight loss and cognition.  Checklist reviewing preventive services available has been given to the patient.        1) Repeat 3 items (Leader, Season, Table)    2) Clock draw: NORMAL  3) 3 item recall: Recalls 3 objects  Results: 3 items recalled: COGNITIVE IMPAIRMENT LESS LIKELY    Mini-CogTM Copyright S Jose. Licensed by the author for use in [facility]; reprinted with permission (emile@.[facility]). All rights reserved.   "           No data to display                         Review of Systems  Constitutional, neuro, ENT, endocrine, pulmonary, cardiac, gastrointestinal, genitourinary, musculoskeletal, integument and psychiatric systems are negative, except as otherwise noted.      Objective    BP (!) 146/84   Pulse 62   Temp 97.5  F (36.4  C) (Temporal)   Ht 1.816 m (5' 11.5\")   Wt 127 kg (280 lb)   SpO2 94%   BMI 38.51 kg/m    Body mass index is 38.51 kg/m .  Physical Exam   Physical Exam  Vitals and nursing note reviewed.   Constitutional:       Comments: Moves normally, alert, no apparent distress  HENT:      Head: Normocephalic and atraumatic.      Nose: Nose normal.   Eyes:      Extraocular Movements: Extraocular movements intact.   Cardiovascular:      Rate and Rhythm: Normal rate and regular rhythm.      Heart sounds: Normal heart sounds.   Pulmonary:      Effort: Pulmonary effort is normal.      Breath sounds: Normal breath sounds.   Abdominal:      General: There is no distension.      Palpations: There is no mass.      Tenderness: No abdominal tenderness, no distention.     Bowel sounds: normal  Musculoskeletal:         General: Normal range of motion, moves into the room normal, moves in and out of chair normally.    Skin:     General: Skin is warm and dry.   Neurological:      General: No focal deficit present.      Mental Status: Alert, responds to questions, Speech coherent  Psychiatric:         Mood and Affect: Mood normal.        Results for orders placed or performed in visit on 07/08/25   UA Macroscopic with reflex to Microscopic and Culture - Clinic Collect     Status: Abnormal    Specimen: Urine, Midstream   Result Value Ref Range    Color Urine Yellow Colorless, Straw, Light Yellow, Yellow    Appearance Urine Slightly Cloudy (A) Clear    Glucose Urine >=1000 (A) Negative mg/dL    Bilirubin Urine Negative Negative    Ketones Urine Trace (A) Negative mg/dL    Specific Gravity Urine 1.015 1.003 - 1.035    Blood " Urine Trace (A) Negative    pH Urine 7.0 5.0 - 7.0    Protein Albumin Urine 30 (A) Negative mg/dL    Urobilinogen Urine 0.2 0.2, 1.0 E.U./dL    Nitrite Urine Negative Negative    Leukocyte Esterase Urine Small (A) Negative   UA Microscopic with Reflex to Culture     Status: Abnormal   Result Value Ref Range    Bacteria Urine Few (A) None Seen /HPF    RBC Urine 2-5 (A) 0-2 /HPF /HPF    WBC Urine 10-25 (A) 0-5 /HPF /HPF    Squamous Epithelials Urine Few (A) None Seen /LPF    WBC Clumps Urine Present (A) None Seen /HPF    Mucus Urine Present (A) None Seen /LPF   Urine Culture     Status: None    Specimen: Urine, Midstream   Result Value Ref Range    Culture 10,000-50,000 CFU/mL Urogenital alice             Results for orders placed or performed in visit on 07/08/25   UA Macroscopic with reflex to Microscopic and Culture - Clinic Collect     Status: Abnormal    Specimen: Urine, Midstream   Result Value Ref Range    Color Urine Yellow Colorless, Straw, Light Yellow, Yellow    Appearance Urine Slightly Cloudy (A) Clear    Glucose Urine >=1000 (A) Negative mg/dL    Bilirubin Urine Negative Negative    Ketones Urine Trace (A) Negative mg/dL    Specific Gravity Urine 1.015 1.003 - 1.035    Blood Urine Trace (A) Negative    pH Urine 7.0 5.0 - 7.0    Protein Albumin Urine 30 (A) Negative mg/dL    Urobilinogen Urine 0.2 0.2, 1.0 E.U./dL    Nitrite Urine Negative Negative    Leukocyte Esterase Urine Small (A) Negative   UA Microscopic with Reflex to Culture     Status: Abnormal   Result Value Ref Range    Bacteria Urine Few (A) None Seen /HPF    RBC Urine 2-5 (A) 0-2 /HPF /HPF    WBC Urine 10-25 (A) 0-5 /HPF /HPF    Squamous Epithelials Urine Few (A) None Seen /LPF    WBC Clumps Urine Present (A) None Seen /HPF    Mucus Urine Present (A) None Seen /LPF   Urine Culture     Status: None    Specimen: Urine, Midstream   Result Value Ref Range    Culture 10,000-50,000 CFU/mL Urogenital alice       The longitudinal plan of care for  the diagnosis(es)/condition(s) as documented were addressed during this visit. Due to the added complexity in care, I will continue to support Lele in the subsequent management and with ongoing continuity of care.      Signed Electronically by: Luis E Faye MD

## 2025-07-08 NOTE — PATIENT INSTRUCTIONS
Bladder infection, possible prostate infection.      --antibiotic indicated:  take Bactrim DS 1 tablet twice daily for at least 14 days, if not 21 .  If your symptoms are completely gone with 14 days of tablets, then no more antibiotics needed.      We will check the results of the urine culture to confirm that the antibiotic prescribed will be effective and make any necessary changes as indicated by the culture and sensitivity results.       Referral back to Urology clinic to re-evaluate your bladder function and urinary frequency.    [facility] will call you to coordinate care as prescribed your provider. If you don t hear from a representative within 2 business days, please call [phone]          Please see the orthopedic hand surgeon about your carpal tunnel syndrome.       --[facility] [place] [phone]   [email or web address]      --[facility] [place] [phone]   [email or web address]       Investigate a Shingrix shingles vaccine with your pharmacist .  They can tell you the coverage and cost and then give it to you if the price is acceptable.    Medicare sometimes does not cover these Shingrix shingles vaccines, and with Medicare insurance it is usually cheaper to receive this shingles vaccine from a pharmacist in a pharmacy rather than in our clinic.    At this time, you only need the 2 Shingrix vaccines and then you are done.      RSV vaccines are now available.  The will help provide protection against respiratory syncytial virus (RSV), a common upper respiratory virus that is known to cause severe inflammation in the airways.  This vaccine is recommended for adults over age 60, especially those with high risk conditions and/or chronic respiratory illnesses such as asthma or COPD.  This vaccine is available at most pharmacies and clinics.    If you are on Medicare insurance, get this vaccine from a pharmacist in a  pharmacy for the best cost and to confirm coverage, it will be less expensive to get this there rather than from our clinic.          Stop Jardiance due to allergic reaction.      Change Metformin to 1000 mg with the evening meal ( 2 x 500 mg extended release tablets)    IF GLP-1 MEDICATIONS ARE AN OPTION FOR YOU:  Consider a GLP-1 medication Tirzepatide (Zepound for obesity or Mounjaro for diabetes primarily) help reduce weight, control diabetes/pre-diabetes, and also reduce risk of future cardiac events.    Start Tirzepatide (Tirzepatide/Mounjaro) injections 2.5 mg once per week for 4 weeks, then increase to 5 mg once per week for at least the next 2 months.    Send a refill request after the first month on the medication indicating that there are no side effects   The dose of this medication may be titrated upwards slowly over the next many months as indicated by your clinical response.     Main side effects include (but are not limited to) nausea, vomiting, stomach upset, and very rarely pancreatitis, or even more rarely neuroendocrine tumors.    Stop the medication and contact us if you develop any concerning possible side effects    One potential main obstacle with these medications can be expense.  Insurance coverage can vary and these medication are extremely expensive if not covered.  Your employer or insurance provider will determine the criteria for coverage of these medications and if you do not meet their criteria, then the medication will not be covered under your insurance.    If not covered by your insurance, you can still take this medication, but would have to pay for it directly.    Check with the 's web site for potential discount coupons or cost savings programs for which you may qualify.      Follow up with me after your first 3 months on Tirzepatide, return sooner if needed.   Use [employer] or Call [phone] to schedule this appointment with me.            5 GOALS IN MANAGING  "DIABETES (i.e. to give the best chance to prevent diabetic complications and vascular disease):     1.  Aggressive diabetic management.  The target for A1C (3 months average blood sugar) is ideally below 6.5, preferably below 7.5    Your diabetes is under good control.      Lab Results   Component Value Date    A1C 7.5 04/15/2025       2.  Aggressive blood pressure control, under 130/80 ideally.  Using medications if needed.    Your blood pressure is under good control    BP Readings from Last 4 Encounters:   07/08/25 (!) 146/84   05/09/25 134/77   04/26/25 123/74   04/15/25 (!) 144/80       3.  Aggressive LDL cholesterol (bad cholesterol) lowering as needed.  Your goal is an LDL under 100 for sure, preferably under 70.     *  All patients with diabetes are recommended to be on a \"statin\" cholesterol lowering medication regardless of the cholesterol levels to give the best chance at avoiding vascular disease.      New guidelines identify four high-risk groups who could benefit from statins:   *people with pre-existing heart disease, such as those who have had a heart attack;   *people ages 40 to 75 who have diabetes of any type  *patients ages 40 to 75 with at least a 7.5% risk of developing cardiovascular disease over the next decade, according to a formula described in the guidelines  *patients with the sort of super-high cholesterol that sometimes runs in families, as evidenced by an LDL of 190 milligrams per deciliter or higher    *  Your cholesterol levels are well controlled.    Recent Labs   Lab Test 05/02/25  0742 10/28/24  0829   CHOL 172 182   HDL 31* 40   * 115*   TRIG 177* 134       --LDL (\"bad\" cholesterol): desired under 100 in diabetes.  Always make better food choices ( lower fats, etc.), statins are recommended for almost all diabetics, regardless of LDL levels.  --HDL (\"good\") cholesterol: (normal above 40 for men, above 50 for women).  Best way to improve the HDL level is through regular " "physical exercise. There are no medications to raise HDL levels.  --Triglycerides (desirable under 150): triglycerides are more about metabolic issues, best way to improve this is through better diet choices, emphasizing reducing the intake of \"simple carbohydrates\" (e.g. White bread, white rice, pasta, noodles, potatoes, snack foods, regular soda, juices (except fresh squeezed), cakes, cookies, candy, etc.) as best possible.  Medications may be considered for triglyceride levels routinely above 400.    4.  No smoking    5.  Consider daily preventative Aspirin once per day, every day over age 50 unless there is a specific reason that you cannot take aspirin.       *You should take Aspirin 81 mg once per day, unless you have a reason NOT to take aspirin (i.e. side effect, excessive bruising or bleeding, taking another \"blood tinning\" medication, etc.)       DIABETES REMINDERS:   1. Check your blood glucose regularly either with standard glucometer or personal continuous glucose monitor.    2) Your blood sugar goals:  before eating and  two hours after eating.  If using personal continuous glucose monitor, the goal is Time in the Target range ( ) of 70-75% with very few (under 2%) Below target.    3) Always be prepared to treat low blood sugar symptoms should it happen. Keep a sugar-containing beverage or food nearby.  4) When to call your clinic:     Blood sugar over 400     If you have 2 to 3 low blood sugars (under 70) in a row    Low readings the same time of day several days in a row  5) When to call 911: If your low blood glucose symptoms do not get better with treatment, or if you/someone else is unable to give you treatment.  6) Work with a Certified Diabetes Educator to assist you with your diabetes management  7) Contact us if you have ANY questions about your medications or instructions, or have problems with getting prescriptions filled.     "

## 2025-07-09 ENCOUNTER — PATIENT OUTREACH (OUTPATIENT)
Dept: CARE COORDINATION | Facility: CLINIC | Age: 80
End: 2025-07-09
Payer: COMMERCIAL

## 2025-07-10 LAB — BACTERIA UR CULT: NORMAL

## 2025-07-22 ENCOUNTER — TELEPHONE (OUTPATIENT)
Dept: INTERNAL MEDICINE | Facility: CLINIC | Age: 80
End: 2025-07-22
Payer: COMMERCIAL

## 2025-07-22 DIAGNOSIS — E11.59 TYPE 2 DIABETES MELLITUS WITH OTHER CIRCULATORY COMPLICATION, WITHOUT LONG-TERM CURRENT USE OF INSULIN (H): Primary | ICD-10-CM

## 2025-07-22 NOTE — TELEPHONE ENCOUNTER
Patient was seen  7/8/2025. He has been poking his finger to check blood sugar . Dr. Faye recommend he prescribe a DEXcom device.     Patient wanted to check with insurance first.     The patient needs an order sent to his pharmacy at [facility] for DEXcom for diabetes.     He understands their is a co-pay for [facility].       Radha Allred RN  [facility]

## 2025-07-22 NOTE — TELEPHONE ENCOUNTER
Let pt know PCP out of office - but will be checking messages occasionally . He'll address this in the coming weeks

## 2025-07-24 DIAGNOSIS — E11.59 TYPE 2 DIABETES MELLITUS WITH OTHER CIRCULATORY COMPLICATION, WITHOUT LONG-TERM CURRENT USE OF INSULIN (H): ICD-10-CM

## 2025-07-24 RX ORDER — ACYCLOVIR 400 MG/1
1 TABLET ORAL ONCE
Qty: 1 EACH | Refills: 0 | Status: SHIPPED | OUTPATIENT
Start: 2025-07-24 | End: 2025-07-24

## 2025-07-24 RX ORDER — ACYCLOVIR 400 MG/1
1 TABLET ORAL
Qty: 9 EACH | Refills: 5 | Status: SHIPPED | OUTPATIENT
Start: 2025-07-24

## 2025-07-24 NOTE — TELEPHONE ENCOUNTER
Prescription for Dexcom G7 sent electronically to specified pharmacy.     His insurance may or may not cover it.  He will either meet their criteria for coverage or they wont.    They customer service from his insurance may tell him that it would be covered with a prior authorization attempt, but if it comes to that, a prior authorization attempt will be useless because he has already not met their criteria for coverage (usually needs to be taking 2 or more insulin shots per day, which will never be the case hopefully for him).    If it is not covered, then he can and should still use it, he would just have to pay cash for it.     He should compare the price of the Freestyle Lirbe 3 plus system vs the Dexcom.  The pharmacist can help tell him.   If the freestyle system is cheapr, then he can contact me and I can send in a RX for that system.     Return to see me in October as planned.     Close encounter when done.

## 2025-07-24 NOTE — TELEPHONE ENCOUNTER
Called Patient and let him know the Dexcom prescription was sent to his pharmacy and relayed the message from PCP.    Deaijia Bazinet VF

## 2025-07-27 DIAGNOSIS — N30.00 ACUTE CYSTITIS WITHOUT HEMATURIA: ICD-10-CM

## 2025-07-28 ENCOUNTER — TELEPHONE (OUTPATIENT)
Dept: INTERNAL MEDICINE | Facility: CLINIC | Age: 80
End: 2025-07-28
Payer: COMMERCIAL

## 2025-07-28 RX ORDER — SULFAMETHOXAZOLE AND TRIMETHOPRIM 800; 160 MG/1; MG/1
1 TABLET ORAL 2 TIMES DAILY
Qty: 42 TABLET | Refills: 0 | Status: SHIPPED | OUTPATIENT
Start: 2025-07-28 | End: 2025-08-18

## 2025-07-28 RX ORDER — ACYCLOVIR 400 MG/1
1 TABLET ORAL
Qty: 9 EACH | Refills: 5 | Status: SHIPPED | OUTPATIENT
Start: 2025-07-28 | End: 2025-07-29

## 2025-07-28 RX ORDER — ACYCLOVIR 400 MG/1
1 TABLET ORAL ONCE
Qty: 1 EACH | Refills: 0 | Status: SHIPPED | OUTPATIENT
Start: 2025-07-28 | End: 2025-07-29

## 2025-07-29 DIAGNOSIS — E11.59 TYPE 2 DIABETES MELLITUS WITH OTHER CIRCULATORY COMPLICATION, WITHOUT LONG-TERM CURRENT USE OF INSULIN (H): ICD-10-CM

## 2025-07-29 RX ORDER — ACYCLOVIR 400 MG/1
TABLET ORAL
Qty: 1 EACH | Refills: 0 | Status: SHIPPED | OUTPATIENT
Start: 2025-07-29

## 2025-07-29 RX ORDER — ACYCLOVIR 400 MG/1
1 TABLET ORAL
Qty: 9 EACH | Refills: 5 | Status: SHIPPED | OUTPATIENT
Start: 2025-07-29

## 2025-07-29 RX ORDER — ACYCLOVIR 400 MG/1
1 TABLET ORAL ONCE
Qty: 1 EACH | Refills: 0 | Status: SHIPPED | OUTPATIENT
Start: 2025-07-29 | End: 2025-07-29

## 2025-07-29 NOTE — TELEPHONE ENCOUNTER
Interface, Eprescribing  P [facility] Medication Refill [facility]  An error occurred while processing the e-prescribing message.    The message was not sent electronically to the requested pharmacy. Contact the pharmacy about the approved prescription.    Code: 601 -  unable to process  Prescription no longer active    Resent Rx.

## 2025-07-29 NOTE — TELEPHONE ENCOUNTER
"Retail Pharmacy Prior Authorization Team   Phone: [phone]      PRIOR AUTHORIZATION DENIED    Medication: DEXCOM G7 SENSOR MISC  Insurance Company: [employer] - Phone [phone] Fax [phone]  Denial Date: 7/28/2025  Denial Reason(s):       Appeal Information: To send an appeal to the patient's insurance, please provide a letter of medical necessity for the requested medication that was denied.  Please use the denial rationale from the patient's insurance to write the letter.  Once it is completed, please have it available under the \"letters tab\" in the patient's chart and route directly back to me: SARAHI ROBISON and I can send the appeal to the patient's insurance.     Patient Notified: No. The [employer] [facility] PA Team does not notify of the denial outcomes as the patient often will ask what their provider will prescribe in place of the denied medication, or additional information in regards to other therapies they can take in place of the denied medication.  This is not something we can advise on in our department.    "

## 2025-07-29 NOTE — TELEPHONE ENCOUNTER
Please let the patient know that their insurance will not cover continuous glucose monitor because they do not meet their criteria for coverage.      Continuous glucose monitors are on the formulary, but only if he meets criteria, so when he was told by customer service that they could be covered, they forgot to mention that he had to meet their specific criteria.      The  insurance program he has chosen has instructed the insurance provider to only cover any continuous glucose monitor if they meet the following criteria:   using insulin to manage diabetes and numerous severe symptomatic hypoglycemic events documented in the chart.      A prior authorization will not be approved unless they meet the specific criteria.     Ongoing glucose monitoring at home is very important for diabetes management, most importantly demonstrating the effect of diet choices on blood glucose levels and reinforcing better diet and lifestyle changes.     Their options for glucose monitoring at this point are:  1.)  Pay cash for a continuous glucose monitor.  The  for Freestyle redd has an automatic coupon available for anyone 65 or younger on non-Medicare insurance to receive the Redd system for no more than $75 for 2 sensors.  Please see the Freestyle Redd product website for further details.  I can send a prescription to the pharmacy of their choosing if desired.     2.)  Uses standard glucometer which is always covered by insurance.  Check glucose readings once per day, rotating between a fasting reading, then a postprandial reading 2 hours after meal.     The patient should let me know how they wish to proceed and I can send the appropriate prescriptions to the pharmacy of their choosing.     Close encounter when done.

## 2025-07-31 NOTE — TELEPHONE ENCOUNTER
Relayed provider message. Pt verbalizes understanding and agrees to plan of care. Pt stating he will continue using his glucometer at home. Pt stating that he checks his blood glucose 4x day. Reinforced provider instruction from note below to check once per day.

## 2025-08-25 ENCOUNTER — OFFICE VISIT (OUTPATIENT)
Dept: UROLOGY | Facility: CLINIC | Age: 80
End: 2025-08-25
Attending: INTERNAL MEDICINE
Payer: COMMERCIAL

## 2025-08-25 VITALS
HEART RATE: 71 BPM | SYSTOLIC BLOOD PRESSURE: 163 MMHG | WEIGHT: 275 LBS | DIASTOLIC BLOOD PRESSURE: 92 MMHG | OXYGEN SATURATION: 95 % | HEIGHT: 72 IN | BODY MASS INDEX: 37.25 KG/M2

## 2025-08-25 DIAGNOSIS — N40.1 BPH WITH OBSTRUCTION/LOWER URINARY TRACT SYMPTOMS: ICD-10-CM

## 2025-08-25 DIAGNOSIS — N40.0 BENIGN PROSTATIC HYPERPLASIA, UNSPECIFIED WHETHER LOWER URINARY TRACT SYMPTOMS PRESENT: Primary | ICD-10-CM

## 2025-08-25 DIAGNOSIS — N30.00 ACUTE CYSTITIS WITHOUT HEMATURIA: ICD-10-CM

## 2025-08-25 DIAGNOSIS — N13.8 BPH WITH OBSTRUCTION/LOWER URINARY TRACT SYMPTOMS: ICD-10-CM

## 2025-08-25 LAB
ALBUMIN UR-MCNC: NEGATIVE MG/DL
APPEARANCE UR: CLEAR
BILIRUB UR QL STRIP: NEGATIVE
COLOR UR AUTO: YELLOW
GLUCOSE UR STRIP-MCNC: NEGATIVE MG/DL
HGB UR QL STRIP: NEGATIVE
KETONES UR STRIP-MCNC: NEGATIVE MG/DL
LEUKOCYTE ESTERASE UR QL STRIP: NEGATIVE
NITRATE UR QL: NEGATIVE
PH UR STRIP: 6.5 [PH] (ref 5–7)
SP GR UR STRIP: 1.01 (ref 1–1.03)
UROBILINOGEN UR STRIP-ACNC: 0.2 E.U./DL

## 2025-08-25 PROCEDURE — 3077F SYST BP >= 140 MM HG: CPT | Performed by: PHYSICIAN ASSISTANT

## 2025-08-25 PROCEDURE — 99203 OFFICE O/P NEW LOW 30 MIN: CPT | Mod: 25 | Performed by: PHYSICIAN ASSISTANT

## 2025-08-25 PROCEDURE — 1126F AMNT PAIN NOTED NONE PRSNT: CPT | Performed by: PHYSICIAN ASSISTANT

## 2025-08-25 PROCEDURE — 51798 US URINE CAPACITY MEASURE: CPT | Performed by: PHYSICIAN ASSISTANT

## 2025-08-25 PROCEDURE — 3080F DIAST BP >= 90 MM HG: CPT | Performed by: PHYSICIAN ASSISTANT

## 2025-08-25 PROCEDURE — 81003 URINALYSIS AUTO W/O SCOPE: CPT | Mod: QW | Performed by: PHYSICIAN ASSISTANT

## 2025-08-25 ASSESSMENT — PAIN SCALES - GENERAL: PAINLEVEL_OUTOF10: NO PAIN (0)

## 2025-08-27 ENCOUNTER — OFFICE VISIT (OUTPATIENT)
Dept: UROLOGY | Facility: CLINIC | Age: 80
End: 2025-08-27
Payer: COMMERCIAL

## 2025-08-27 VITALS
BODY MASS INDEX: 38.5 KG/M2 | DIASTOLIC BLOOD PRESSURE: 89 MMHG | HEART RATE: 72 BPM | OXYGEN SATURATION: 95 % | HEIGHT: 71 IN | SYSTOLIC BLOOD PRESSURE: 178 MMHG | WEIGHT: 275 LBS

## 2025-08-27 DIAGNOSIS — R97.20 ELEVATED PROSTATE SPECIFIC ANTIGEN (PSA): ICD-10-CM

## 2025-08-27 DIAGNOSIS — N40.1 BPH WITH OBSTRUCTION/LOWER URINARY TRACT SYMPTOMS: Primary | ICD-10-CM

## 2025-08-27 DIAGNOSIS — N13.8 BPH WITH OBSTRUCTION/LOWER URINARY TRACT SYMPTOMS: Primary | ICD-10-CM

## 2025-08-27 LAB
ALBUMIN UR-MCNC: NEGATIVE MG/DL
APPEARANCE UR: CLEAR
BILIRUB UR QL STRIP: NEGATIVE
COLOR UR AUTO: YELLOW
GLUCOSE UR STRIP-MCNC: NEGATIVE MG/DL
HGB UR QL STRIP: NEGATIVE
KETONES UR STRIP-MCNC: NEGATIVE MG/DL
LEUKOCYTE ESTERASE UR QL STRIP: NEGATIVE
NITRATE UR QL: NEGATIVE
PH UR STRIP: 8.5 [PH] (ref 5–7)
SP GR UR STRIP: 1.01 (ref 1–1.03)
UROBILINOGEN UR STRIP-ACNC: 0.2 E.U./DL

## 2025-08-27 PROCEDURE — 81003 URINALYSIS AUTO W/O SCOPE: CPT | Mod: QW | Performed by: UROLOGY

## 2025-08-27 RX ORDER — LIDOCAINE HYDROCHLORIDE 20 MG/ML
JELLY TOPICAL ONCE
Status: COMPLETED | OUTPATIENT
Start: 2025-08-27 | End: 2025-08-27

## 2025-08-27 RX ADMIN — LIDOCAINE HYDROCHLORIDE 5 ML: 20 JELLY TOPICAL at 15:32

## 2025-08-28 ENCOUNTER — OFFICE VISIT (OUTPATIENT)
Dept: CARDIOLOGY | Facility: CLINIC | Age: 80
End: 2025-08-28
Attending: NURSE PRACTITIONER
Payer: COMMERCIAL

## 2025-08-28 VITALS
HEART RATE: 68 BPM | BODY MASS INDEX: 38.5 KG/M2 | DIASTOLIC BLOOD PRESSURE: 87 MMHG | SYSTOLIC BLOOD PRESSURE: 147 MMHG | HEIGHT: 71 IN | WEIGHT: 275 LBS | OXYGEN SATURATION: 94 %

## 2025-08-28 DIAGNOSIS — E78.2 MIXED HYPERLIPIDEMIA: ICD-10-CM

## 2025-08-28 DIAGNOSIS — I10 BENIGN ESSENTIAL HYPERTENSION: Primary | ICD-10-CM

## 2025-08-28 DIAGNOSIS — I35.0 NONRHEUMATIC AORTIC VALVE STENOSIS: ICD-10-CM

## 2025-08-28 DIAGNOSIS — I10 ESSENTIAL HYPERTENSION, BENIGN: ICD-10-CM

## 2025-08-28 DIAGNOSIS — E11.9 TYPE 2 DIABETES MELLITUS WITHOUT COMPLICATION, WITHOUT LONG-TERM CURRENT USE OF INSULIN (H): ICD-10-CM

## 2025-08-28 DIAGNOSIS — I25.10 CORONARY ARTERY DISEASE INVOLVING NATIVE CORONARY ARTERY OF NATIVE HEART WITHOUT ANGINA PECTORIS: ICD-10-CM

## 2025-08-28 RX ORDER — LOSARTAN POTASSIUM 25 MG/1
25 TABLET ORAL 2 TIMES DAILY
Qty: 90 TABLET | Refills: 3 | Status: SHIPPED | OUTPATIENT
Start: 2025-08-28

## (undated) DEVICE — SU VICRYL 0 UR-6 27" J603H

## (undated) DEVICE — KIT PATIENT POSITIONING PIGAZZI LATEX FREE 40580

## (undated) DEVICE — SYSTEM LAPAROVUE VISIBILITY LAPVUE10

## (undated) DEVICE — SOL NACL 0.9% INJ 1000ML BAG 2B1324X

## (undated) DEVICE — SU VICRYL 3-0 SH CR 8X18" J774

## (undated) DEVICE — SU VICRYL 0 ENDOLOOP 18" EJ10

## (undated) DEVICE — TAPE CLOTH ADHESIVE 3" LATEX FREE

## (undated) DEVICE — ESU PENCIL W/HOLSTER E2350H

## (undated) DEVICE — PACK LAP CHOLE SLC15LCFSD

## (undated) DEVICE — NDL INSUFFLATION 120MM VERRES

## (undated) DEVICE — ESU GROUND PAD UNIVERSAL W/O CORD

## (undated) DEVICE — ENDO TROCAR BLUNT TIP KII BALLOON 12X100MM C0R47

## (undated) DEVICE — SU MONOCRYL 4-0 PS-2 18" UND Y496G

## (undated) DEVICE — LINEN TOWEL PACK X5 5464

## (undated) DEVICE — ENDO TROCAR FIRST ENTRY KII FIOS Z-THRD 05X100MM CTF03

## (undated) DEVICE — CATH TRAY FOLEY COUDE 16FR SILVER W/URINE METER 350ML 304716

## (undated) DEVICE — SU PDS II 1 CT MONOFIL Z353H

## (undated) DEVICE — ENDO SCOPE WARMER LF TM500

## (undated) DEVICE — STPL LINEAR 90 X 3.5MM TA9035S

## (undated) DEVICE — DRAPE IOBAN INCISE 23X17" 6650EZ

## (undated) DEVICE — ESU HOLDER LAP INST DISP PURPLE LONG 330MM H-PRO-330

## (undated) DEVICE — ESU LIGASURE LAPAROSCOPIC BLUNT TIP SEALER 5MMX37CM LF1837

## (undated) DEVICE — ENDO TROCAR SLEEVE KII Z-THREADED 05X100MM CTS02

## (undated) DEVICE — Device

## (undated) DEVICE — SOL WATER IRRIG 1000ML BOTTLE 2F7114

## (undated) DEVICE — STPL LINEAR CUT 75MM TLC75

## (undated) DEVICE — GLOVE BIOGEL PI ULTRATOUCH G SZ 7.0 42170

## (undated) DEVICE — PREP CHLORAPREP 26ML TINTED HI-LITE ORANGE 930815

## (undated) DEVICE — GLOVE BIOGEL PI MICRO INDICATOR UNDERGLOVE SZ 7.0 48970

## (undated) DEVICE — SUCTION CANISTER MEDIVAC LINER 3000ML W/LID 65651-530

## (undated) DEVICE — SU MONOCRYL 4-0 P-3 18" UND Y494G

## (undated) DEVICE — SU VICRYL 3-0 SH 27" J316H

## (undated) DEVICE — SOL NACL 0.9% IRRIG 1000ML BOTTLE 2F7124

## (undated) RX ORDER — PROPOFOL 10 MG/ML
INJECTION, EMULSION INTRAVENOUS
Status: DISPENSED
Start: 2023-02-16

## (undated) RX ORDER — METRONIDAZOLE 500 MG/100ML
INJECTION, SOLUTION INTRAVENOUS
Status: DISPENSED
Start: 2023-02-16

## (undated) RX ORDER — ONDANSETRON 2 MG/ML
INJECTION INTRAMUSCULAR; INTRAVENOUS
Status: DISPENSED
Start: 2023-02-16

## (undated) RX ORDER — NEOSTIGMINE METHYLSULFATE 1 MG/ML
VIAL (ML) INJECTION
Status: DISPENSED
Start: 2023-02-16

## (undated) RX ORDER — BUPIVACAINE HYDROCHLORIDE AND EPINEPHRINE 5; 5 MG/ML; UG/ML
INJECTION, SOLUTION EPIDURAL; INTRACAUDAL; PERINEURAL
Status: DISPENSED
Start: 2023-02-16

## (undated) RX ORDER — HEPARIN SODIUM 5000 [USP'U]/.5ML
INJECTION, SOLUTION INTRAVENOUS; SUBCUTANEOUS
Status: DISPENSED
Start: 2023-02-16

## (undated) RX ORDER — FENTANYL CITRATE 50 UG/ML
INJECTION, SOLUTION INTRAMUSCULAR; INTRAVENOUS
Status: DISPENSED
Start: 2023-02-16

## (undated) RX ORDER — KETOROLAC TROMETHAMINE 30 MG/ML
INJECTION, SOLUTION INTRAMUSCULAR; INTRAVENOUS
Status: DISPENSED
Start: 2023-02-16

## (undated) RX ORDER — DEXAMETHASONE SODIUM PHOSPHATE 4 MG/ML
INJECTION, SOLUTION INTRA-ARTICULAR; INTRALESIONAL; INTRAMUSCULAR; INTRAVENOUS; SOFT TISSUE
Status: DISPENSED
Start: 2023-02-16

## (undated) RX ORDER — CEFAZOLIN SODIUM/WATER 2 G/20 ML
SYRINGE (ML) INTRAVENOUS
Status: DISPENSED
Start: 2023-02-16

## (undated) RX ORDER — FENTANYL CITRATE 0.05 MG/ML
INJECTION, SOLUTION INTRAMUSCULAR; INTRAVENOUS
Status: DISPENSED
Start: 2023-02-16

## (undated) RX ORDER — REGADENOSON 0.08 MG/ML
INJECTION, SOLUTION INTRAVENOUS
Status: DISPENSED
Start: 2019-02-28

## (undated) RX ORDER — ACETAMINOPHEN 325 MG/1
TABLET ORAL
Status: DISPENSED
Start: 2023-02-16